# Patient Record
Sex: FEMALE | Race: WHITE | NOT HISPANIC OR LATINO | Employment: FULL TIME | URBAN - NONMETROPOLITAN AREA
[De-identification: names, ages, dates, MRNs, and addresses within clinical notes are randomized per-mention and may not be internally consistent; named-entity substitution may affect disease eponyms.]

---

## 2017-03-24 ENCOUNTER — OFFICE VISIT (OUTPATIENT)
Dept: NEUROLOGY | Facility: CLINIC | Age: 37
End: 2017-03-24

## 2017-03-24 VITALS
BODY MASS INDEX: 40.48 KG/M2 | WEIGHT: 220 LBS | HEIGHT: 62 IN | SYSTOLIC BLOOD PRESSURE: 128 MMHG | HEART RATE: 74 BPM | DIASTOLIC BLOOD PRESSURE: 88 MMHG

## 2017-03-24 DIAGNOSIS — E03.9 HYPOTHYROIDISM, UNSPECIFIED TYPE: ICD-10-CM

## 2017-03-24 DIAGNOSIS — G43.719 INTRACTABLE CHRONIC MIGRAINE WITHOUT AURA AND WITHOUT STATUS MIGRAINOSUS: Primary | ICD-10-CM

## 2017-03-24 PROCEDURE — 99213 OFFICE O/P EST LOW 20 MIN: CPT | Performed by: CLINICAL NURSE SPECIALIST

## 2017-03-24 RX ORDER — LEVETIRACETAM 500 MG/1
500 TABLET ORAL 3 TIMES DAILY
Qty: 90 TABLET | Refills: 5 | Status: SHIPPED | OUTPATIENT
Start: 2017-03-24 | End: 2017-10-13 | Stop reason: SDUPTHER

## 2017-03-24 RX ORDER — INDOMETHACIN 25 MG/1
25 CAPSULE ORAL 2 TIMES DAILY PRN
Qty: 60 CAPSULE | Refills: 2 | Status: SHIPPED | OUTPATIENT
Start: 2017-03-24 | End: 2017-10-13

## 2017-03-24 RX ORDER — SUMATRIPTAN 100 MG/1
100 TABLET, FILM COATED ORAL ONCE AS NEEDED
Qty: 9 TABLET | Refills: 1 | Status: SHIPPED | OUTPATIENT
Start: 2017-03-24 | End: 2017-10-13 | Stop reason: SDUPTHER

## 2017-03-24 NOTE — PATIENT INSTRUCTIONS
Wean and d/c indocin. Take one time daily with largest meal for 1-2 weeks then d/c.   Take indocin prn at on set of HA.

## 2017-03-24 NOTE — PROGRESS NOTES
Subjective     Chief Complaint   Patient presents with   • Migraine     1 migraine per month. Doing well. 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 She was last seen by 9/2/16. She had been on multiple treatments for migraine including Botox which was stopped as she was having no improvement. As a last ditch effort Indocin 25mg BID was started an patient has had significant improvement with headaches. She does occasionally have  Headaches:   One migraine monthly, last about 1 day and can take imitrex and can get relief. Onzetra did not show improvement with efficacy. But 2-3 HA per month can 2-3 days and come and go over the 2-3 days. Still taking indocin 25 mg BID.  Has not had to miss work because of HA.     She was tried on Zantac for GI distress but not able to tolerate. She is continuing Indocin 25 mg BID with food. She has had tremendous success with this in decreasing the number of HA and migraines. However, she is now having progressive GI side effects secondary to the indocin. She is also continuing with Keppra for her migraines and uses Imitrex as abortive agent.    Migraine    This is a chronic problem. Episode onset: while in college. The problem occurs daily. Progression since onset: significant improvement with indocin and has had one severe headache lastin 5 days with nausea/vomiting. The pain is located in the occipital and bilateral (as well as right side of head) region. The quality of the pain is described as stabbing and sharp. The pain is at a severity of 8/10. The pain is severe. Associated symptoms include nausea, phonophobia, photophobia and vomiting. Pertinent negatives include no dizziness, fever, rhinorrhea, sore throat or weakness. The symptoms are aggravated by weather changes and menstrual cycle. Treatments tried: topamax, vimpat, maxalt, relpax, elavil, fioricet, DHE, Keppra,  "Indocin, botox, zomig. The treatment provided moderate relief.        Current Outpatient Prescriptions   Medication Sig Dispense Refill   • AMETHIA 0.15-0.03 &0.01 MG tablet TK 1 T PO QD  3   • busPIRone (BUSPAR) 5 MG tablet Take 5 mg by mouth 1 (One) Time Per Week.  3   • cyclobenzaprine (FLEXERIL) 10 MG tablet TK 1 T PO TID PRN  3   • hydrOXYzine (ATARAX) 25 MG tablet Take 25 mg by mouth Daily.  1   • indomethacin (INDOCIN) 25 MG capsule Take 1 capsule by mouth 2 (Two) Times a Day As Needed for Mild Pain (1-3). 60 capsule 2   • levETIRAcetam (KEPPRA) 500 MG tablet Take 1 tablet by mouth 3 (Three) Times a Day. 500 in am and 1000 in pm 90 tablet 5   • levothyroxine (SYNTHROID, LEVOTHROID) 25 MCG tablet Take 25 mcg by mouth daily.     • LYRICA 75 MG capsule TK ONE C PO  TID  1   • SUMAtriptan (IMITREX) 100 MG tablet Take 1 tablet by mouth 1 (One) Time As Needed for migraine (1 tab by mouth as needed, max 2 tabs per day) for up to 1 dose. 9 tablet 1     No current facility-administered medications for this visit.        Past Medical History:   Diagnosis Date   • Fibromyalgia    • Migraines        Past Surgical History:   Procedure Laterality Date   •  SECTION         family history includes No Known Problems in her father and mother.    Social History   Substance Use Topics   • Smoking status: Never Smoker   • Smokeless tobacco: Never Used   • Alcohol use No       Review of Systems   Constitutional: Positive for fatigue. Negative for fever.   HENT: Negative for postnasal drip, rhinorrhea and sore throat.    Eyes: Positive for photophobia.   Respiratory: Negative.  Negative for apnea, choking and shortness of breath.    Cardiovascular: Negative.  Negative for chest pain.   Gastrointestinal: Positive for constipation, nausea and vomiting. Negative for diarrhea.        Recently having sensation as though \"food stuck\"   Endocrine: Negative.    Genitourinary: Negative.  Negative for dysuria and frequency. " "  Musculoskeletal: Positive for arthralgias. Negative for gait problem, joint swelling and myalgias.   Skin: Negative.    Allergic/Immunologic: Negative.    Neurological: Positive for headaches. Negative for dizziness, tremors, speech difficulty and weakness.   Hematological: Negative.  Negative for adenopathy.   Psychiatric/Behavioral: Negative.  Negative for agitation and confusion.   All other systems reviewed and are negative.      Objective     /88  Pulse 74  Ht 62\" (157.5 cm)  Wt 220 lb (99.8 kg)  BMI 40.24 kg/m2, Body mass index is 40.24 kg/(m^2).    Physical Exam   Constitutional: She is oriented to person, place, and time. Vital signs are normal. She appears well-developed and well-nourished.   HENT:   Head: Normocephalic and atraumatic.   Right Ear: Hearing and external ear normal.   Left Ear: Hearing and external ear normal.   Nose: Nose normal.   Mouth/Throat: Uvula is midline, oropharynx is clear and moist and mucous membranes are normal.   Eyes: EOM and lids are normal. Pupils are equal, round, and reactive to light.   Neck: Trachea normal and normal range of motion. Neck supple. Carotid bruit is not present.   Cardiovascular: Normal rate, regular rhythm, S1 normal, S2 normal and normal heart sounds.    Pulmonary/Chest: Effort normal and breath sounds normal.   Abdominal: Soft. Bowel sounds are normal.   Musculoskeletal: Normal range of motion.   Neurological: She is alert and oriented to person, place, and time. She has normal strength and normal reflexes. No cranial nerve deficit or sensory deficit. She displays a negative Romberg sign.   CN II:  Visual fields full.  Pupils equally reactive to light  CN III, IV, VI:  Extraocular Muscles full with no signs of nystagmus  CN V:  Facial sensory is symmetric with no asymetries.  CN VII:  Facial motor symmetric  CN VIII:  Gross hearing intact bilaterally  CN IX:  Palate elevates symmetrically  CN X:  Palate elevates symmetrically  CN XI:  " Shoulder shrug symmetric  CN XII:  Tongue is midline on protrusion   Skin: Skin is warm and dry.   Psychiatric: She has a normal mood and affect. Her speech is normal and behavior is normal. Cognition and memory are normal.   Nursing note and vitals reviewed.           ASSESSMENT/PLAN    Diagnoses and all orders for this visit:    Intractable chronic migraine without aura and without status migrainosus    Other orders  -     indomethacin (INDOCIN) 25 MG capsule; Take 1 capsule by mouth 2 (Two) Times a Day As Needed for Mild Pain (1-3).  -     levETIRAcetam (KEPPRA) 500 MG tablet; Take 1 tablet by mouth 3 (Three) Times a Day. 500 in am and 1000 in pm  -     SUMAtriptan (IMITREX) 100 MG tablet; Take 1 tablet by mouth 1 (One) Time As Needed for migraine (1 tab by mouth as needed, max 2 tabs per day) for up to 1 dose.    MEDICAL DECISION MAKIN. Continue with Keppra 500 mg in AM and 1000 mg PM for migraine prophylaxis.  2. Wean and d/c indocin and use as abortive agent only  3. Continue with Imitrex as abortive agent.             allergies and all known medications/prescriptions have been reviewed using resources available on this encounter.    Return in about 6 months (around 2017).        HOLLY Loja

## 2017-10-13 ENCOUNTER — OFFICE VISIT (OUTPATIENT)
Dept: NEUROLOGY | Facility: CLINIC | Age: 37
End: 2017-10-13

## 2017-10-13 ENCOUNTER — INFUSION (OUTPATIENT)
Dept: ONCOLOGY | Facility: HOSPITAL | Age: 37
End: 2017-10-13

## 2017-10-13 VITALS
SYSTOLIC BLOOD PRESSURE: 138 MMHG | DIASTOLIC BLOOD PRESSURE: 96 MMHG | HEART RATE: 98 BPM | BODY MASS INDEX: 40.67 KG/M2 | OXYGEN SATURATION: 100 % | WEIGHT: 221 LBS | HEIGHT: 62 IN | RESPIRATION RATE: 16 BRPM | TEMPERATURE: 98.8 F

## 2017-10-13 VITALS
BODY MASS INDEX: 40.67 KG/M2 | HEIGHT: 62 IN | OXYGEN SATURATION: 99 % | RESPIRATION RATE: 16 BRPM | DIASTOLIC BLOOD PRESSURE: 98 MMHG | WEIGHT: 221 LBS | HEART RATE: 116 BPM | SYSTOLIC BLOOD PRESSURE: 140 MMHG

## 2017-10-13 DIAGNOSIS — G43.719 INTRACTABLE CHRONIC MIGRAINE WITHOUT AURA AND WITHOUT STATUS MIGRAINOSUS: Primary | ICD-10-CM

## 2017-10-13 PROCEDURE — 25010000002 DEXAMETHASONE PER 1 MG: Performed by: CLINICAL NURSE SPECIALIST

## 2017-10-13 PROCEDURE — 96374 THER/PROPH/DIAG INJ IV PUSH: CPT

## 2017-10-13 PROCEDURE — 96360 HYDRATION IV INFUSION INIT: CPT

## 2017-10-13 PROCEDURE — 96361 HYDRATE IV INFUSION ADD-ON: CPT

## 2017-10-13 PROCEDURE — 25010000002 PROMETHAZINE PER 50 MG: Performed by: CLINICAL NURSE SPECIALIST

## 2017-10-13 PROCEDURE — 25010000002 DIPHENHYDRAMINE PER 50 MG: Performed by: CLINICAL NURSE SPECIALIST

## 2017-10-13 PROCEDURE — 99214 OFFICE O/P EST MOD 30 MIN: CPT | Performed by: CLINICAL NURSE SPECIALIST

## 2017-10-13 PROCEDURE — 96375 TX/PRO/DX INJ NEW DRUG ADDON: CPT

## 2017-10-13 RX ORDER — SODIUM CHLORIDE 9 MG/ML
500 INJECTION, SOLUTION INTRAVENOUS ONCE
Status: COMPLETED | OUTPATIENT
Start: 2017-10-13 | End: 2017-10-13

## 2017-10-13 RX ORDER — LEVETIRACETAM 500 MG/1
TABLET ORAL
Qty: 90 TABLET | Refills: 5 | Status: SHIPPED | OUTPATIENT
Start: 2017-10-13 | End: 2017-11-29

## 2017-10-13 RX ORDER — DIPHENHYDRAMINE HYDROCHLORIDE 50 MG/ML
25 INJECTION INTRAMUSCULAR; INTRAVENOUS ONCE
OUTPATIENT
Start: 2017-10-13

## 2017-10-13 RX ORDER — PROMETHAZINE HYDROCHLORIDE 25 MG/ML
25 INJECTION, SOLUTION INTRAMUSCULAR; INTRAVENOUS ONCE
Status: COMPLETED | OUTPATIENT
Start: 2017-10-13 | End: 2017-10-13

## 2017-10-13 RX ORDER — PROMETHAZINE HYDROCHLORIDE 25 MG/ML
25 INJECTION, SOLUTION INTRAMUSCULAR; INTRAVENOUS ONCE
Status: CANCELLED | OUTPATIENT
Start: 2017-10-13

## 2017-10-13 RX ORDER — DIPHENHYDRAMINE HYDROCHLORIDE 50 MG/ML
25 INJECTION INTRAMUSCULAR; INTRAVENOUS ONCE
Status: COMPLETED | OUTPATIENT
Start: 2017-10-13 | End: 2017-10-13

## 2017-10-13 RX ORDER — DEXAMETHASONE SODIUM PHOSPHATE 4 MG/ML
10 INJECTION, SOLUTION INTRA-ARTICULAR; INTRALESIONAL; INTRAMUSCULAR; INTRAVENOUS; SOFT TISSUE ONCE
Status: CANCELLED
Start: 2017-10-13 | End: 2017-10-13

## 2017-10-13 RX ORDER — SODIUM CHLORIDE 9 MG/ML
500 INJECTION, SOLUTION INTRAVENOUS ONCE
OUTPATIENT
Start: 2017-10-13 | End: 2017-10-13

## 2017-10-13 RX ORDER — MEMANTINE HYDROCHLORIDE 5 MG/1
5 TABLET ORAL DAILY
Qty: 60 TABLET | Refills: 3 | Status: SHIPPED | OUTPATIENT
Start: 2017-10-13 | End: 2017-11-20 | Stop reason: SDUPTHER

## 2017-10-13 RX ORDER — DEXAMETHASONE SODIUM PHOSPHATE 10 MG/ML
10 INJECTION INTRAMUSCULAR; INTRAVENOUS ONCE
Status: COMPLETED | OUTPATIENT
Start: 2017-10-13 | End: 2017-10-13

## 2017-10-13 RX ORDER — DIPHENHYDRAMINE HYDROCHLORIDE 50 MG/ML
25 INJECTION INTRAMUSCULAR; INTRAVENOUS ONCE
Status: CANCELLED | OUTPATIENT
Start: 2017-10-13

## 2017-10-13 RX ORDER — PROMETHAZINE HYDROCHLORIDE 25 MG/ML
25 INJECTION, SOLUTION INTRAMUSCULAR; INTRAVENOUS ONCE
OUTPATIENT
Start: 2017-10-13

## 2017-10-13 RX ORDER — SUMATRIPTAN 100 MG/1
100 TABLET, FILM COATED ORAL ONCE AS NEEDED
Qty: 9 TABLET | Refills: 2 | Status: SHIPPED | OUTPATIENT
Start: 2017-10-13 | End: 2018-01-19 | Stop reason: ALTCHOICE

## 2017-10-13 RX ORDER — TRIAMCINOLONE ACETONIDE 5 MG/G
CREAM TOPICAL
Refills: 1 | COMMUNITY
Start: 2017-08-28 | End: 2017-10-13

## 2017-10-13 RX ORDER — SODIUM CHLORIDE 9 MG/ML
500 INJECTION, SOLUTION INTRAVENOUS ONCE
Status: CANCELLED | OUTPATIENT
Start: 2017-10-13 | End: 2017-10-13

## 2017-10-13 RX ORDER — PROMETHAZINE HYDROCHLORIDE 25 MG/1
TABLET ORAL
Qty: 15 TABLET | Refills: 1 | Status: SHIPPED | OUTPATIENT
Start: 2017-10-13 | End: 2017-11-29

## 2017-10-13 RX ADMIN — PROMETHAZINE HYDROCHLORIDE 25 MG: 25 INJECTION INTRAMUSCULAR; INTRAVENOUS at 14:33

## 2017-10-13 RX ADMIN — SODIUM CHLORIDE 500 ML: 9 INJECTION, SOLUTION INTRAVENOUS at 14:28

## 2017-10-13 RX ADMIN — DIPHENHYDRAMINE HYDROCHLORIDE 25 MG: 50 INJECTION, SOLUTION INTRAMUSCULAR; INTRAVENOUS at 14:37

## 2017-10-13 RX ADMIN — DEXAMETHASONE SODIUM PHOSPHATE 10 MG: 10 INJECTION, SOLUTION INTRAMUSCULAR; INTRAVENOUS at 14:46

## 2017-10-13 NOTE — PROGRESS NOTES
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 She is here today for follow up for migraine. She had been on multiple treatments for migraine including Botox which was stopped as she was having no improvement. As a last ditch effort Indocin 25mg BID was started an patient has had significant improvement with headaches. However she was beginning to have GI effects and Indocin was discontinued. In the last few months patient states she has been having increasing frequency and intensity of her migraines. She gets only mild relief with Imitrex. Migraines are typically right orbital/temporal and on more than one occasion has had some on left side. She has a HA now that has been present for 2 weeks. She has missed work because of migraine and has reduced her work load but nothing has helped. She has had some numbness on her face. With severe HA is having imbalance and impaired cognition.   She is also continuing with Keppra for her migraines and uses Imitrex as abortive agent.    Migraine    This is a chronic problem. Episode onset: while in college. The problem occurs daily. Progression since onset: significant improvement with indocin and has had one severe headache lastin 5 days with nausea/vomiting. The pain is located in the occipital and bilateral (as well as right side of head) region. The quality of the pain is described as stabbing and sharp. The pain is at a severity of 8/10. The pain is severe. Associated symptoms include nausea, phonophobia, photophobia and vomiting. Pertinent negatives include no dizziness, fever, rhinorrhea, sore throat or weakness. The symptoms are aggravated by weather changes and menstrual cycle. Treatments tried: topamax, vimpat, maxalt, relpax, elavil, fioricet, DHE, Keppra, Indocin, botox,  "zomig. The treatment provided moderate relief.        Current Outpatient Prescriptions   Medication Sig Dispense Refill   • AMETHIA 0.15-0.03 &0.01 MG tablet TK 1 T PO QD  3   • busPIRone (BUSPAR) 5 MG tablet Take 5 mg by mouth 1 (One) Time Per Week.  3   • cyclobenzaprine (FLEXERIL) 10 MG tablet TK 1 T PO TID PRN  3   • hydrOXYzine (ATARAX) 25 MG tablet Take 25 mg by mouth Daily.  1   • levothyroxine (SYNTHROID, LEVOTHROID) 25 MCG tablet Take 25 mcg by mouth daily.     • LYRICA 75 MG capsule TK ONE C PO  TID  1   • levETIRAcetam (KEPPRA) 500 MG tablet TAKE 500 in am and 1000 in pm 90 tablet 5   • memantine (NAMENDA) 5 MG tablet Take 1 tablet by mouth Daily. 60 tablet 3   • promethazine (PHENERGAN) 25 MG tablet TAKE 1/2 TO 1 TABLET EVERY 6 HOURS AS NEEDED FOR NAUSEA. 15 tablet 1   • SUMAtriptan (IMITREX) 100 MG tablet Take 1 tablet by mouth 1 (One) Time As Needed for Migraine (1 tab by mouth as needed, max 2 tabs per day) for up to 1 dose. 9 tablet 2     No current facility-administered medications for this visit.        Past Medical History:   Diagnosis Date   • Fibromyalgia    • Migraines        Past Surgical History:   Procedure Laterality Date   •  SECTION         family history includes No Known Problems in her father and mother.    Social History   Substance Use Topics   • Smoking status: Never Smoker   • Smokeless tobacco: Never Used   • Alcohol use No       Review of Systems   Constitutional: Positive for fatigue. Negative for fever.   HENT: Negative for postnasal drip, rhinorrhea and sore throat.    Eyes: Positive for photophobia.   Respiratory: Negative.  Negative for apnea, choking and shortness of breath.    Cardiovascular: Negative.  Negative for chest pain.   Gastrointestinal: Positive for constipation, nausea and vomiting. Negative for diarrhea.        Recently having sensation as though \"food stuck\"   Endocrine: Negative.    Genitourinary: Negative.  Negative for dysuria and frequency. " "  Musculoskeletal: Positive for arthralgias. Negative for gait problem, joint swelling and myalgias.   Skin: Negative.    Allergic/Immunologic: Negative.    Neurological: Positive for headaches. Negative for dizziness, tremors, speech difficulty and weakness.   Hematological: Negative.  Negative for adenopathy.   Psychiatric/Behavioral: Negative.  Negative for agitation and confusion.   All other systems reviewed and are negative.      Objective     /98 (BP Location: Left arm, Patient Position: Sitting, Cuff Size: Adult)  Pulse 116  Resp 16  Ht 62\" (157.5 cm)  Wt 221 lb (100 kg)  SpO2 99%  BMI 40.42 kg/m2, Body mass index is 40.42 kg/(m^2).    Physical Exam   Constitutional: She is oriented to person, place, and time. Vital signs are normal. She appears well-developed and well-nourished.   HENT:   Head: Normocephalic and atraumatic.   Right Ear: Hearing and external ear normal.   Left Ear: Hearing and external ear normal.   Nose: Nose normal.   Mouth/Throat: Uvula is midline, oropharynx is clear and moist and mucous membranes are normal.   Eyes: Conjunctivae, EOM and lids are normal. Pupils are equal, round, and reactive to light.   Neck: Trachea normal and normal range of motion. Neck supple. Carotid bruit is not present.   Cardiovascular: Normal rate, regular rhythm, S1 normal, S2 normal and normal heart sounds.    Pulmonary/Chest: Effort normal and breath sounds normal.   Abdominal: Soft. Bowel sounds are normal.   Musculoskeletal: Normal range of motion.   Neurological: She is alert and oriented to person, place, and time. She has normal strength and normal reflexes. No cranial nerve deficit or sensory deficit. She displays a negative Romberg sign.   CN II:  Visual fields full.  Pupils equally reactive to light  CN III, IV, VI:  Extraocular Muscles full with no signs of nystagmus  CN V:  Facial sensory is symmetric with no asymetries.  CN VII:  Facial motor symmetric  CN VIII:  Gross hearing intact " bilaterally  CN IX:  Palate elevates symmetrically  CN X:  Palate elevates symmetrically  CN XI:  Shoulder shrug symmetric  CN XII:  Tongue is midline on protrusion   Skin: Skin is warm and dry.   Psychiatric: She has a normal mood and affect. Her speech is normal and behavior is normal. Cognition and memory are normal.   Nursing note and vitals reviewed.           ASSESSMENT/PLAN    Diagnoses and all orders for this visit:    Intractable chronic migraine without aura and without status migrainosus  -     MRI brain w wo contrast; Future  -     Cancel: sodium chloride 0.9 % infusion 500 mL; Infuse 500 mL into a venous catheter 1 (One) Time.  -     Cancel: diphenhydrAMINE (BENADRYL) injection 25 mg; Infuse 0.5 mL into a venous catheter 1 (One) Time.  -     Cancel: promethazine (PHENERGAN) injection 25 mg; Inject 1 mL as directed 1 (One) Time.  -     Cancel: dexamethasone (DECADRON) injection 10 mg; Infuse 2.5 mL into a venous catheter 1 (One) Time.    Other orders  -     Discontinue: triamcinolone (KENALOG) 0.5 % cream; TAHIR EXT AA BID PRN  -     memantine (NAMENDA) 5 MG tablet; Take 1 tablet by mouth Daily.  -     promethazine (PHENERGAN) 25 MG tablet; TAKE 1/2 TO 1 TABLET EVERY 6 HOURS AS NEEDED FOR NAUSEA.  -     SUMAtriptan (IMITREX) 100 MG tablet; Take 1 tablet by mouth 1 (One) Time As Needed for Migraine (1 tab by mouth as needed, max 2 tabs per day) for up to 1 dose.  -     levETIRAcetam (KEPPRA) 500 MG tablet; TAKE 500 in am and 1000 in pm      MEDICAL DECISION MAKIN. Continue with Keppra 500 mg in AM and 1000 mg PM for migraine prophylaxis.  2. Will try Namenda 5 mg BID and counseled on side effects  3. Continue with Imitrex as abortive agent.   4. Obtain MRI brain as prior MRI brain did show abnormality that could possible be a single focus of demyelination would be in the differential diagnosis.  5. Does not use tobacco  6. Elevated BMI -      Patient given printed education with AVS for diet/exerise  with AVS and encouraged to include 30 minutes of exercise 3-4 times weekly.  7. Will send to outpatient for migraine cocktail. Patient does have a .     allergies and all known medications/prescriptions have been reviewed using resources available on this encounter.    Return in about 6 weeks (around 11/24/2017).        Chantal Euceda, APRN

## 2017-10-13 NOTE — PATIENT INSTRUCTIONS

## 2017-10-18 ENCOUNTER — TELEPHONE (OUTPATIENT)
Dept: NEUROLOGY | Facility: CLINIC | Age: 37
End: 2017-10-18

## 2017-10-18 NOTE — TELEPHONE ENCOUNTER
She called to let Chantal know that she has tried Vimpat in the past for her headaches and it did work well for her. She will continue with the Namenda, but wanted Chantal to know that could be an option again in the future if Chantal thought it might be ok.

## 2017-11-03 ENCOUNTER — APPOINTMENT (OUTPATIENT)
Dept: MRI IMAGING | Facility: HOSPITAL | Age: 37
End: 2017-11-03

## 2017-11-03 ENCOUNTER — HOSPITAL ENCOUNTER (OUTPATIENT)
Dept: MRI IMAGING | Facility: HOSPITAL | Age: 37
Discharge: HOME | End: 2017-11-03
Admitting: CLINICAL NURSE SPECIALIST

## 2017-11-03 DIAGNOSIS — G43.719 INTRACTABLE CHRONIC MIGRAINE WITHOUT AURA AND WITHOUT STATUS MIGRAINOSUS: ICD-10-CM

## 2017-11-03 PROCEDURE — A9577 INJ MULTIHANCE: HCPCS | Performed by: CLINICAL NURSE SPECIALIST

## 2017-11-03 PROCEDURE — 82565 ASSAY OF CREATININE: CPT

## 2017-11-03 PROCEDURE — 70553 MRI BRAIN STEM W/O & W/DYE: CPT

## 2017-11-03 PROCEDURE — 0 GADOBENATE DIMEGLUMINE 529 MG/ML SOLUTION: Performed by: CLINICAL NURSE SPECIALIST

## 2017-11-03 RX ADMIN — GADOBENATE DIMEGLUMINE 20 ML: 529 INJECTION, SOLUTION INTRAVENOUS at 07:30

## 2017-11-04 LAB — CREAT BLDA-MCNC: 0.9 MG/DL (ref 0.6–1.3)

## 2017-11-21 RX ORDER — MEMANTINE HYDROCHLORIDE 5 MG/1
5 TABLET ORAL 2 TIMES DAILY
Qty: 60 TABLET | Refills: 3 | Status: SHIPPED | OUTPATIENT
Start: 2017-11-21 | End: 2017-11-29

## 2017-11-29 ENCOUNTER — APPOINTMENT (OUTPATIENT)
Dept: GENERAL RADIOLOGY | Facility: HOSPITAL | Age: 37
End: 2017-11-29

## 2017-11-29 ENCOUNTER — HOSPITAL ENCOUNTER (OUTPATIENT)
Facility: HOSPITAL | Age: 37
Setting detail: OBSERVATION
Discharge: HOME | End: 2017-11-30
Attending: EMERGENCY MEDICINE | Admitting: INTERNAL MEDICINE

## 2017-11-29 DIAGNOSIS — R51.9 INTRACTABLE HEADACHE, UNSPECIFIED CHRONICITY PATTERN, UNSPECIFIED HEADACHE TYPE: Primary | ICD-10-CM

## 2017-11-29 DIAGNOSIS — R07.9 CHEST PAIN, UNSPECIFIED TYPE: ICD-10-CM

## 2017-11-29 LAB
ALBUMIN SERPL-MCNC: 4.3 G/DL (ref 3.5–5)
ALBUMIN/GLOB SERPL: 1.2 G/DL (ref 1.1–2.5)
ALP SERPL-CCNC: 86 U/L (ref 24–120)
ALT SERPL W P-5'-P-CCNC: 28 U/L (ref 0–54)
ANION GAP SERPL CALCULATED.3IONS-SCNC: 10 MMOL/L (ref 4–13)
AST SERPL-CCNC: 32 U/L (ref 7–45)
BASOPHILS # BLD AUTO: 0.07 10*3/MM3 (ref 0–0.2)
BASOPHILS NFR BLD AUTO: 0.7 % (ref 0–2)
BILIRUB SERPL-MCNC: 0.4 MG/DL (ref 0.1–1)
BUN BLD-MCNC: 7 MG/DL (ref 5–21)
BUN/CREAT SERPL: 7.7 (ref 7–25)
CALCIUM SPEC-SCNC: 9.5 MG/DL (ref 8.4–10.4)
CHLORIDE SERPL-SCNC: 105 MMOL/L (ref 98–110)
CO2 SERPL-SCNC: 27 MMOL/L (ref 24–31)
CREAT BLD-MCNC: 0.91 MG/DL (ref 0.5–1.4)
D DIMER PPP FEU-MCNC: 0.52 MG/L (FEU) (ref 0–0.5)
DEPRECATED RDW RBC AUTO: 39.9 FL (ref 40–54)
EOSINOPHIL # BLD AUTO: 0.13 10*3/MM3 (ref 0–0.7)
EOSINOPHIL NFR BLD AUTO: 1.2 % (ref 0–4)
ERYTHROCYTE [DISTWIDTH] IN BLOOD BY AUTOMATED COUNT: 14 % (ref 12–15)
GFR SERPL CREATININE-BSD FRML MDRD: 70 ML/MIN/1.73
GLOBULIN UR ELPH-MCNC: 3.5 GM/DL
GLUCOSE BLD-MCNC: 87 MG/DL (ref 70–100)
HBA1C MFR BLD: 5.7 %
HCG SERPL QL: NEGATIVE
HCT VFR BLD AUTO: 39.9 % (ref 37–47)
HGB BLD-MCNC: 13 G/DL (ref 12–16)
HOLD SPECIMEN: NORMAL
HOLD SPECIMEN: NORMAL
IMM GRANULOCYTES # BLD: 0.04 10*3/MM3 (ref 0–0.03)
IMM GRANULOCYTES NFR BLD: 0.4 % (ref 0–5)
LYMPHOCYTES # BLD AUTO: 2.38 10*3/MM3 (ref 0.72–4.86)
LYMPHOCYTES NFR BLD AUTO: 22.5 % (ref 15–45)
MAGNESIUM SERPL-MCNC: 1.9 MG/DL (ref 1.4–2.2)
MCH RBC QN AUTO: 25.7 PG (ref 28–32)
MCHC RBC AUTO-ENTMCNC: 32.6 G/DL (ref 33–36)
MCV RBC AUTO: 79 FL (ref 82–98)
MONOCYTES # BLD AUTO: 0.52 10*3/MM3 (ref 0.19–1.3)
MONOCYTES NFR BLD AUTO: 4.9 % (ref 4–12)
NEUTROPHILS # BLD AUTO: 7.45 10*3/MM3 (ref 1.87–8.4)
NEUTROPHILS NFR BLD AUTO: 70.3 % (ref 39–78)
NRBC BLD MANUAL-RTO: 0 /100 WBC (ref 0–0)
PLATELET # BLD AUTO: 321 10*3/MM3 (ref 130–400)
PMV BLD AUTO: 10.3 FL (ref 6–12)
POTASSIUM BLD-SCNC: 4.1 MMOL/L (ref 3.5–5.3)
PROT SERPL-MCNC: 7.8 G/DL (ref 6.3–8.7)
RBC # BLD AUTO: 5.05 10*6/MM3 (ref 4.2–5.4)
SODIUM BLD-SCNC: 142 MMOL/L (ref 135–145)
TROPONIN I SERPL-MCNC: <0.012 NG/ML (ref 0–0.03)
TSH SERPL DL<=0.05 MIU/L-ACNC: 2.94 MIU/ML (ref 0.47–4.68)
VIT B12 BLD-MCNC: 646 PG/ML (ref 239–931)
WBC NRBC COR # BLD: 10.59 10*3/MM3 (ref 4.8–10.8)
WHOLE BLOOD HOLD SPECIMEN: NORMAL
WHOLE BLOOD HOLD SPECIMEN: NORMAL

## 2017-11-29 PROCEDURE — 36415 COLL VENOUS BLD VENIPUNCTURE: CPT

## 2017-11-29 PROCEDURE — 93005 ELECTROCARDIOGRAM TRACING: CPT

## 2017-11-29 PROCEDURE — 84484 ASSAY OF TROPONIN QUANT: CPT | Performed by: EMERGENCY MEDICINE

## 2017-11-29 PROCEDURE — 25010000002 PROCHLORPERAZINE EDISYLATE PER 10 MG: Performed by: PSYCHIATRY & NEUROLOGY

## 2017-11-29 PROCEDURE — 99285 EMERGENCY DEPT VISIT HI MDM: CPT

## 2017-11-29 PROCEDURE — G0378 HOSPITAL OBSERVATION PER HR: HCPCS

## 2017-11-29 PROCEDURE — 25010000002 ONDANSETRON PER 1 MG: Performed by: EMERGENCY MEDICINE

## 2017-11-29 PROCEDURE — 96361 HYDRATE IV INFUSION ADD-ON: CPT

## 2017-11-29 PROCEDURE — 96375 TX/PRO/DX INJ NEW DRUG ADDON: CPT

## 2017-11-29 PROCEDURE — 80053 COMPREHEN METABOLIC PANEL: CPT | Performed by: EMERGENCY MEDICINE

## 2017-11-29 PROCEDURE — 85025 COMPLETE CBC W/AUTO DIFF WBC: CPT | Performed by: EMERGENCY MEDICINE

## 2017-11-29 PROCEDURE — 25010000002 ENOXAPARIN PER 10 MG: Performed by: INTERNAL MEDICINE

## 2017-11-29 PROCEDURE — 71010 HC CHEST PA OR AP: CPT

## 2017-11-29 PROCEDURE — 84703 CHORIONIC GONADOTROPIN ASSAY: CPT | Performed by: EMERGENCY MEDICINE

## 2017-11-29 PROCEDURE — 96372 THER/PROPH/DIAG INJ SC/IM: CPT

## 2017-11-29 PROCEDURE — 25010000002 DIHYDROERGOTAMINE MESYLATE PER 1 MG: Performed by: PSYCHIATRY & NEUROLOGY

## 2017-11-29 PROCEDURE — 99255 IP/OBS CONSLTJ NEW/EST HI 80: CPT | Performed by: PSYCHIATRY & NEUROLOGY

## 2017-11-29 PROCEDURE — 84443 ASSAY THYROID STIM HORMONE: CPT | Performed by: INTERNAL MEDICINE

## 2017-11-29 PROCEDURE — 83036 HEMOGLOBIN GLYCOSYLATED A1C: CPT | Performed by: INTERNAL MEDICINE

## 2017-11-29 PROCEDURE — 96374 THER/PROPH/DIAG INJ IV PUSH: CPT

## 2017-11-29 PROCEDURE — 93010 ELECTROCARDIOGRAM REPORT: CPT | Performed by: INTERNAL MEDICINE

## 2017-11-29 PROCEDURE — 82607 VITAMIN B-12: CPT | Performed by: INTERNAL MEDICINE

## 2017-11-29 PROCEDURE — 25010000002 HYDROMORPHONE PER 4 MG: Performed by: EMERGENCY MEDICINE

## 2017-11-29 PROCEDURE — 85379 FIBRIN DEGRADATION QUANT: CPT | Performed by: EMERGENCY MEDICINE

## 2017-11-29 PROCEDURE — 84484 ASSAY OF TROPONIN QUANT: CPT | Performed by: NURSE PRACTITIONER

## 2017-11-29 PROCEDURE — 83735 ASSAY OF MAGNESIUM: CPT | Performed by: INTERNAL MEDICINE

## 2017-11-29 RX ORDER — CYCLOBENZAPRINE HCL 10 MG
10 TABLET ORAL 3 TIMES DAILY PRN
Status: DISCONTINUED | OUTPATIENT
Start: 2017-11-29 | End: 2017-11-30 | Stop reason: HOSPADM

## 2017-11-29 RX ORDER — LEVETIRACETAM 500 MG/1
1000 TABLET ORAL EVERY EVENING
Status: DISCONTINUED | OUTPATIENT
Start: 2017-11-29 | End: 2017-11-30 | Stop reason: HOSPADM

## 2017-11-29 RX ORDER — ONDANSETRON 4 MG/1
4 TABLET, FILM COATED ORAL EVERY 6 HOURS PRN
Status: DISCONTINUED | OUTPATIENT
Start: 2017-11-29 | End: 2017-11-30 | Stop reason: HOSPADM

## 2017-11-29 RX ORDER — ACETAMINOPHEN 325 MG/1
650 TABLET ORAL EVERY 4 HOURS PRN
Status: DISCONTINUED | OUTPATIENT
Start: 2017-11-29 | End: 2017-11-30 | Stop reason: HOSPADM

## 2017-11-29 RX ORDER — LEVETIRACETAM 500 MG/1
500 TABLET ORAL EVERY MORNING
Status: DISCONTINUED | OUTPATIENT
Start: 2017-11-30 | End: 2017-11-30 | Stop reason: HOSPADM

## 2017-11-29 RX ORDER — MEMANTINE HYDROCHLORIDE 5 MG/1
5 TABLET ORAL EVERY 12 HOURS SCHEDULED
Status: DISCONTINUED | OUTPATIENT
Start: 2017-11-29 | End: 2017-11-30

## 2017-11-29 RX ORDER — LEVOTHYROXINE SODIUM 0.03 MG/1
25 TABLET ORAL
Status: DISCONTINUED | OUTPATIENT
Start: 2017-11-30 | End: 2017-11-30 | Stop reason: HOSPADM

## 2017-11-29 RX ORDER — SODIUM CHLORIDE 9 MG/ML
75 INJECTION, SOLUTION INTRAVENOUS CONTINUOUS
Status: DISCONTINUED | OUTPATIENT
Start: 2017-11-29 | End: 2017-11-30 | Stop reason: HOSPADM

## 2017-11-29 RX ORDER — BUSPIRONE HYDROCHLORIDE 5 MG/1
5 TABLET ORAL DAILY
Status: DISCONTINUED | OUTPATIENT
Start: 2017-11-29 | End: 2017-11-30 | Stop reason: HOSPADM

## 2017-11-29 RX ORDER — ONDANSETRON 2 MG/ML
4 INJECTION INTRAMUSCULAR; INTRAVENOUS EVERY 6 HOURS PRN
Status: DISCONTINUED | OUTPATIENT
Start: 2017-11-29 | End: 2017-11-30 | Stop reason: HOSPADM

## 2017-11-29 RX ORDER — PREGABALIN 75 MG/1
75 CAPSULE ORAL DAILY
Status: DISCONTINUED | OUTPATIENT
Start: 2017-11-30 | End: 2017-11-30 | Stop reason: HOSPADM

## 2017-11-29 RX ORDER — LEVONORGESTREL / ETHINYL ESTRADIOL AND ETHINYL ESTRADIOL 150-30(84)
1 KIT ORAL DAILY
COMMUNITY
End: 2020-07-24 | Stop reason: SDUPTHER

## 2017-11-29 RX ORDER — HYDROXYZINE HYDROCHLORIDE 25 MG/1
25 TABLET, FILM COATED ORAL DAILY
Status: DISCONTINUED | OUTPATIENT
Start: 2017-11-29 | End: 2017-11-30 | Stop reason: HOSPADM

## 2017-11-29 RX ORDER — CYCLOBENZAPRINE HCL 10 MG
10 TABLET ORAL 3 TIMES DAILY PRN
COMMUNITY
End: 2020-06-19 | Stop reason: SDUPTHER

## 2017-11-29 RX ORDER — PROMETHAZINE HYDROCHLORIDE 25 MG/1
25 TABLET ORAL EVERY 6 HOURS PRN
Status: DISCONTINUED | OUTPATIENT
Start: 2017-11-29 | End: 2017-11-30 | Stop reason: HOSPADM

## 2017-11-29 RX ORDER — LEVETIRACETAM 500 MG/1
1000 TABLET ORAL EVERY EVENING
COMMUNITY
End: 2018-07-20 | Stop reason: DRUGHIGH

## 2017-11-29 RX ORDER — SODIUM CHLORIDE 0.9 % (FLUSH) 0.9 %
1-10 SYRINGE (ML) INJECTION AS NEEDED
Status: DISCONTINUED | OUTPATIENT
Start: 2017-11-29 | End: 2017-11-30 | Stop reason: HOSPADM

## 2017-11-29 RX ORDER — PROMETHAZINE HYDROCHLORIDE 25 MG/1
25 TABLET ORAL EVERY 6 HOURS PRN
COMMUNITY
End: 2019-05-17 | Stop reason: ALTCHOICE

## 2017-11-29 RX ORDER — MEMANTINE HYDROCHLORIDE 5 MG/1
5 TABLET ORAL 2 TIMES DAILY
COMMUNITY
End: 2018-03-19 | Stop reason: SDUPTHER

## 2017-11-29 RX ORDER — PREGABALIN 75 MG/1
150 CAPSULE ORAL NIGHTLY
Status: DISCONTINUED | OUTPATIENT
Start: 2017-11-29 | End: 2017-11-30 | Stop reason: HOSPADM

## 2017-11-29 RX ORDER — DIHYDROERGOTAMINE MESYLATE 1 MG/ML
1 INJECTION, SOLUTION INTRAMUSCULAR; INTRAVENOUS; SUBCUTANEOUS ONCE
Status: COMPLETED | OUTPATIENT
Start: 2017-11-29 | End: 2017-11-29

## 2017-11-29 RX ORDER — ONDANSETRON 4 MG/1
4 TABLET, ORALLY DISINTEGRATING ORAL EVERY 6 HOURS PRN
Status: DISCONTINUED | OUTPATIENT
Start: 2017-11-29 | End: 2017-11-30 | Stop reason: HOSPADM

## 2017-11-29 RX ORDER — LEVETIRACETAM 500 MG/1
500 TABLET ORAL EVERY MORNING
COMMUNITY
End: 2018-07-20 | Stop reason: DRUGHIGH

## 2017-11-29 RX ORDER — PREGABALIN 75 MG/1
75 CAPSULE ORAL 3 TIMES DAILY
COMMUNITY
End: 2019-05-17

## 2017-11-29 RX ORDER — DIHYDROERGOTAMINE MESYLATE 1 MG/ML
1 INJECTION, SOLUTION INTRAMUSCULAR; INTRAVENOUS; SUBCUTANEOUS ONCE
Status: DISCONTINUED | OUTPATIENT
Start: 2017-11-29 | End: 2017-11-30 | Stop reason: HOSPADM

## 2017-11-29 RX ORDER — ONDANSETRON 2 MG/ML
4 INJECTION INTRAMUSCULAR; INTRAVENOUS ONCE
Status: COMPLETED | OUTPATIENT
Start: 2017-11-29 | End: 2017-11-29

## 2017-11-29 RX ORDER — SUMATRIPTAN 50 MG/1
100 TABLET, FILM COATED ORAL ONCE AS NEEDED
Status: DISCONTINUED | OUTPATIENT
Start: 2017-11-29 | End: 2017-11-30 | Stop reason: HOSPADM

## 2017-11-29 RX ADMIN — PREGABALIN 150 MG: 75 CAPSULE ORAL at 20:35

## 2017-11-29 RX ADMIN — HYDROXYZINE HYDROCHLORIDE 25 MG: 25 TABLET ORAL at 20:32

## 2017-11-29 RX ADMIN — LEVETIRACETAM 1000 MG: 500 TABLET, FILM COATED ORAL at 20:32

## 2017-11-29 RX ADMIN — ONDANSETRON 4 MG: 2 INJECTION, SOLUTION INTRAMUSCULAR; INTRAVENOUS at 11:22

## 2017-11-29 RX ADMIN — MEMANTINE HYDROCHLORIDE 5 MG: 5 TABLET, FILM COATED ORAL at 20:32

## 2017-11-29 RX ADMIN — PROCHLORPERAZINE EDISYLATE 5 MG: 5 INJECTION INTRAMUSCULAR; INTRAVENOUS at 20:18

## 2017-11-29 RX ADMIN — ENOXAPARIN SODIUM 40 MG: 40 INJECTION SUBCUTANEOUS at 17:50

## 2017-11-29 RX ADMIN — HYDROMORPHONE HYDROCHLORIDE 1 MG: 1 INJECTION, SOLUTION INTRAMUSCULAR; INTRAVENOUS; SUBCUTANEOUS at 11:21

## 2017-11-29 RX ADMIN — DIHYDROERGOTAMINE MESYLATE 1 MG: 1 INJECTION, SOLUTION INTRAMUSCULAR; INTRAVENOUS; SUBCUTANEOUS at 20:26

## 2017-11-29 RX ADMIN — SODIUM CHLORIDE 75 ML/HR: 9 INJECTION, SOLUTION INTRAVENOUS at 17:44

## 2017-11-30 VITALS
RESPIRATION RATE: 16 BRPM | BODY MASS INDEX: 41.04 KG/M2 | TEMPERATURE: 98.1 F | HEART RATE: 87 BPM | OXYGEN SATURATION: 96 % | SYSTOLIC BLOOD PRESSURE: 121 MMHG | HEIGHT: 62 IN | WEIGHT: 223 LBS | DIASTOLIC BLOOD PRESSURE: 77 MMHG

## 2017-11-30 LAB
ANION GAP SERPL CALCULATED.3IONS-SCNC: 10 MMOL/L (ref 4–13)
BACTERIA UR QL AUTO: ABNORMAL /HPF
BASOPHILS # BLD AUTO: 0.06 10*3/MM3 (ref 0–0.2)
BASOPHILS NFR BLD AUTO: 0.6 % (ref 0–2)
BILIRUB UR QL STRIP: NEGATIVE
BUN BLD-MCNC: 7 MG/DL (ref 5–21)
BUN/CREAT SERPL: 7.4 (ref 7–25)
CALCIUM SPEC-SCNC: 8.9 MG/DL (ref 8.4–10.4)
CHLORIDE SERPL-SCNC: 106 MMOL/L (ref 98–110)
CLARITY UR: CLEAR
CO2 SERPL-SCNC: 25 MMOL/L (ref 24–31)
COLOR UR: YELLOW
CREAT BLD-MCNC: 0.95 MG/DL (ref 0.5–1.4)
DEPRECATED RDW RBC AUTO: 39.1 FL (ref 40–54)
EOSINOPHIL # BLD AUTO: 0.23 10*3/MM3 (ref 0–0.7)
EOSINOPHIL NFR BLD AUTO: 2.4 % (ref 0–4)
ERYTHROCYTE [DISTWIDTH] IN BLOOD BY AUTOMATED COUNT: 13.7 % (ref 12–15)
GFR SERPL CREATININE-BSD FRML MDRD: 66 ML/MIN/1.73
GLUCOSE BLD-MCNC: 85 MG/DL (ref 70–100)
GLUCOSE UR STRIP-MCNC: NEGATIVE MG/DL
HCT VFR BLD AUTO: 37.5 % (ref 37–47)
HGB BLD-MCNC: 12.6 G/DL (ref 12–16)
HGB UR QL STRIP.AUTO: ABNORMAL
HYALINE CASTS UR QL AUTO: ABNORMAL /LPF
IMM GRANULOCYTES # BLD: 0.02 10*3/MM3 (ref 0–0.03)
IMM GRANULOCYTES NFR BLD: 0.2 % (ref 0–5)
KETONES UR QL STRIP: NEGATIVE
LEUKOCYTE ESTERASE UR QL STRIP.AUTO: ABNORMAL
LYMPHOCYTES # BLD AUTO: 2.95 10*3/MM3 (ref 0.72–4.86)
LYMPHOCYTES NFR BLD AUTO: 31 % (ref 15–45)
MCH RBC QN AUTO: 26.1 PG (ref 28–32)
MCHC RBC AUTO-ENTMCNC: 33.6 G/DL (ref 33–36)
MCV RBC AUTO: 77.8 FL (ref 82–98)
MONOCYTES # BLD AUTO: 0.56 10*3/MM3 (ref 0.19–1.3)
MONOCYTES NFR BLD AUTO: 5.9 % (ref 4–12)
NEUTROPHILS # BLD AUTO: 5.71 10*3/MM3 (ref 1.87–8.4)
NEUTROPHILS NFR BLD AUTO: 59.9 % (ref 39–78)
NITRITE UR QL STRIP: NEGATIVE
NRBC BLD MANUAL-RTO: 0 /100 WBC (ref 0–0)
PH UR STRIP.AUTO: 5.5 [PH] (ref 5–8)
PLATELET # BLD AUTO: 280 10*3/MM3 (ref 130–400)
PMV BLD AUTO: 9.8 FL (ref 6–12)
POTASSIUM BLD-SCNC: 4 MMOL/L (ref 3.5–5.3)
PROT UR QL STRIP: NEGATIVE
RBC # BLD AUTO: 4.82 10*6/MM3 (ref 4.2–5.4)
RBC # UR: ABNORMAL /HPF
REF LAB TEST METHOD: ABNORMAL
SODIUM BLD-SCNC: 141 MMOL/L (ref 135–145)
SP GR UR STRIP: 1.01 (ref 1–1.03)
SQUAMOUS #/AREA URNS HPF: ABNORMAL /HPF
UROBILINOGEN UR QL STRIP: ABNORMAL
WBC NRBC COR # BLD: 9.53 10*3/MM3 (ref 4.8–10.8)
WBC UR QL AUTO: ABNORMAL /HPF

## 2017-11-30 PROCEDURE — G0378 HOSPITAL OBSERVATION PER HR: HCPCS

## 2017-11-30 PROCEDURE — 96361 HYDRATE IV INFUSION ADD-ON: CPT

## 2017-11-30 PROCEDURE — 99232 SBSQ HOSP IP/OBS MODERATE 35: CPT | Performed by: PSYCHIATRY & NEUROLOGY

## 2017-11-30 PROCEDURE — 80048 BASIC METABOLIC PNL TOTAL CA: CPT | Performed by: INTERNAL MEDICINE

## 2017-11-30 PROCEDURE — 85025 COMPLETE CBC W/AUTO DIFF WBC: CPT | Performed by: INTERNAL MEDICINE

## 2017-11-30 PROCEDURE — 81001 URINALYSIS AUTO W/SCOPE: CPT | Performed by: NURSE PRACTITIONER

## 2017-11-30 RX ADMIN — LEVOTHYROXINE SODIUM 25 MCG: 25 TABLET ORAL at 06:36

## 2017-11-30 RX ADMIN — SODIUM CHLORIDE 75 ML/HR: 9 INJECTION, SOLUTION INTRAVENOUS at 06:36

## 2017-11-30 RX ADMIN — PREGABALIN 75 MG: 75 CAPSULE ORAL at 08:50

## 2017-11-30 RX ADMIN — HYDROXYZINE HYDROCHLORIDE 25 MG: 25 TABLET ORAL at 08:50

## 2017-11-30 RX ADMIN — MEMANTINE HYDROCHLORIDE 5 MG: 5 TABLET, FILM COATED ORAL at 08:50

## 2017-11-30 RX ADMIN — LEVETIRACETAM 500 MG: 500 TABLET, FILM COATED ORAL at 06:47

## 2017-11-30 RX ADMIN — BUSPIRONE HYDROCHLORIDE 5 MG: 5 TABLET ORAL at 08:50

## 2017-12-01 NOTE — PAYOR COMM NOTE
"[id number]  NEED APPROVAL FOR OBS STAY  DC HOME 11-30-17  UR PHONE    [phone]    Jonah English (37 y.o. Female)     Date of Birth Social Security Number Address Home Phone MRN    1980  [facility] [facility] [id number] [phone] [id number]    [employer] Marital Status          Catholic        Admission Date Admission Type Admitting Provider Attending Provider Department, Room/Bed    11/29/17 Emergency Branden Parks MD  [facility] 3A, 334/1    Discharge Date Discharge Disposition Discharge Destination        11/30/2017 Home or Self Care             Attending Provider: (none)    Allergies:  Codeine, Elavil [Amitriptyline Hcl], Maxalt [Rizatriptan Benzoate], Relpax [Eletriptan]    Isolation:  None   Infection:  None   Code Status:  Prior    Ht:  62\" (157.5 cm)   Wt:  223 lb (101 kg)    Admission Cmt:  None   Principal Problem:  None                Active Insurance as of 11/29/2017     Primary Coverage     Payor Plan Insurance Group Employer/Plan Group    [employer] BLUE CROSS AN[employer] BLUE CROSS BLUE SHIELD PPO [id number]     Payor Plan Address Payor Plan Phone Number Effective From Effective To    [id number] [phone] 5/1/2016     [place] [address]       Subscriber Name Subscriber Birth Date Member ID       JONAH ENGLISH 1980 [id number]                 Emergency Contacts      (Rel.) Home Phone Work Phone Mobile Phone    Nestor English (Spouse) [phone] -- [phone]               Discharge Summary      HOLLY Weber at 11/30/2017  1:22 PM     Attestation signed by Yoandy Parks MD at 12/1/2017  9:09 AM        I have discussed the care of Jonah English, including pertinent history and exam findings, with the nurse practitioner.    I have seen and examined the patient and the key elements of all parts of the encounter have been performed by me.  I agree with the assessment, plan and orders as " documented by Dominga BARRERA, after I modified the exam findings and the plan of treatments and the final version is my approved version of the assessment.        Electronically signed by Yoandy Parks MD on 12/1/2017 at 9:09 AM                                 [facility]  Yoandy Parks M.D.  BRICE Parks M.D.  HOLLY Acevdeo    Internal Medicine Discharge Summary    Patient ID: Talita English  MRN: [id number]     Acct:  [id number]       Patient's PCP: Branden Parks MD    Admit Date: 11/29/2017     Discharge Date:   11/30/17    Admitting Physician: Branden Parks MD    Discharge Physician: HOLLY Weber     Active Discharge Diagnoses:  1. Status migrainosus  2. Noncardiac chest pain  3. Obesity  4. Fibromyalgia        Hospital Problems  Active Problems:    Intractable headache     Past Medical History:   Diagnosis Date   • Fibromyalgia    • Migraines        The patient was seen and examined on the day of discharge and this discharge summary is in conjunction with any daily progress note from day of discharge.    Code Status:  Full Code    Hospital Course: The patient had been in her usual state of health when she developed an intractable global headache refractory to home medication regimen that includes imitrex and keppra. The patient experienced sound and light sensitivity with mild nausea. The patient then reports developing left side chest pain. Due to an extensive family cardiac history, the patient presented to ER for further evaluation and treatment. Cardiac enzymes remained negative and there were no significant EKG changes. The pain was reproducible under the left breast on palpation. Pain control efforts regarding the headache were unsuccessful in ER and the patient was admitted to our service for further evaluation and treatment. She was seen in consultation by neurology and given a cocktail of compazine and DHE which the patient tolerated  without difficulty and provided good symptom relief. Today, on exam, the patient is free of headache and still has some faint left side chest pain that is reproducible on palpation and aggravated by movement. She is anxious for discharge to home. The patient will be discharged to home with close follow up by neurology and PCP.     Consults:  Dr. Matthew (neurology)    Disposition: home    Discharged Condition: Stable    Follow Up: Branden Parks MD  [address] [place] [address]  [phone]      keep scheduled follow up      Diet: Diet Regular    Discharge Medications:    Talita English   Home Medication Instructions ALVINA:[id number]    Printed on:11/30/17 8857   Medication Information                      busPIRone (BUSPAR) 5 MG tablet  Take 5 mg by mouth Daily.             cyclobenzaprine (FLEXERIL) 10 MG tablet  Take 10 mg by mouth 3 (Three) Times a Day As Needed for Muscle Spasms.             hydrOXYzine (ATARAX) 25 MG tablet  Take 25 mg by mouth Daily.             levETIRAcetam (KEPPRA) 500 MG tablet  Take 500 mg by mouth Every Morning.             levETIRAcetam (KEPPRA) 500 MG tablet  Take 1,000 mg by mouth Every Evening.             Levonorgest-Eth Estrad 91-Day (AMETHIA) 0.15-0.03 &0.01 MG tablet  Take 1 tablet by mouth Daily.             levothyroxine (SYNTHROID, LEVOTHROID) 25 MCG tablet  Take 25 mcg by mouth daily.             memantine (NAMENDA) 5 MG tablet  Take 5 mg by mouth 2 (Two) Times a Day.             pregabalin (LYRICA) 75 MG capsule  Take 75 mg by mouth 3 (Three) Times a Day.             promethazine (PHENERGAN) 25 MG tablet  Take 25 mg by mouth Every 6 (Six) Hours As Needed for Nausea or Vomiting.             SUMAtriptan (IMITREX) 100 MG tablet  Take 1 tablet by mouth 1 (One) Time As Needed for Migraine (1 tab by mouth as needed, max 2 tabs per day) for up to 1 dose.                 Time Spent on discharge is  20 minutes in patient examination, evaluation, patient/family  counseling as well as medication reconciliation, prescriptions for required medications, discharge plan and follow up.     Electronically signed by HOLLY Weber on 11/30/2017 at 1:22 PM      Electronically signed by Yoandy Parks MD at 12/1/2017  9:09 AM

## 2017-12-01 NOTE — THERAPY DISCHARGE NOTE
Acute Care - Physical Therapy Discharge Summary  [facility]       Patient Name: Talita English  : 1980  MRN: [id number]    Today's Date: 2017                 Admit Date: 2017      PT Recommendation and Plan    Visit Dx:    ICD-10-CM ICD-9-CM   1. Intractable headache, unspecified chronicity pattern, unspecified headache type R51 784.0   2. Chest pain, unspecified type R07.9 786.50                         PT Discharge Summary  Anticipated Discharge Disposition: home  Reason for Discharge: Discharge from facility  Outcomes Achieved: Other (d/c before eval complete only here 24 hours)  Discharge Destination: Home      Martha Chun, PADMA   2017

## 2018-01-19 ENCOUNTER — OFFICE VISIT (OUTPATIENT)
Dept: NEUROLOGY | Facility: CLINIC | Age: 38
End: 2018-01-19

## 2018-01-19 VITALS
DIASTOLIC BLOOD PRESSURE: 78 MMHG | SYSTOLIC BLOOD PRESSURE: 128 MMHG | BODY MASS INDEX: 41.59 KG/M2 | HEIGHT: 62 IN | WEIGHT: 226 LBS | HEART RATE: 82 BPM

## 2018-01-19 DIAGNOSIS — G43.719 INTRACTABLE CHRONIC MIGRAINE WITHOUT AURA AND WITHOUT STATUS MIGRAINOSUS: Primary | ICD-10-CM

## 2018-01-19 PROCEDURE — 99214 OFFICE O/P EST MOD 30 MIN: CPT | Performed by: CLINICAL NURSE SPECIALIST

## 2018-01-19 RX ORDER — PROCHLORPERAZINE MALEATE 5 MG/1
5 TABLET ORAL EVERY 8 HOURS PRN
Qty: 20 TABLET | Refills: 2 | Status: SHIPPED | OUTPATIENT
Start: 2018-01-19 | End: 2018-04-20 | Stop reason: SDUPTHER

## 2018-01-19 RX ORDER — SUMATRIPTAN AND NAPROXEN SODIUM 85; 500 MG/1; MG/1
1 TABLET, FILM COATED ORAL
Qty: 9 TABLET | Refills: 2 | Status: SHIPPED | OUTPATIENT
Start: 2018-01-19 | End: 2018-01-23

## 2018-01-19 NOTE — PROGRESS NOTES
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 She was last seen 10/13/17 and was sent to [facility] for migraine cocktail. Patient got only partial relief from that and migraines escalated again over several weeks and presented to [facility] ED 11/29/17 with severe migraine with chest pain. She was admitted under Dr. Matthew care and received DHE which she states did give her relief for about 10 days. HAs and migraines have slowly returned and she reports that she has had HA/migraine for the last 7 daysince weather change when cold front came through 7 days ago. HA has been constant.  She is taking imitrex at least one time daily and if needed twice daily. She has not had to miss work. In October 2017 patient was also started on Namenda 5 mg BID and patient feels like this has been of some benefit. She has also begun to take butter ashley a few weeks ago but as above weather has triggered a constant headache. Patient is also experimenting with essential oils. Patient did have MRI brain 11/2017 and I have reviewed results.     Migraine    This is a chronic problem. The current episode started more than 1 year ago (while in college). The problem occurs daily. Progression since onset: significant improvement with indocin and has had one severe headache lastin 5 days with nausea/vomiting. The pain is located in the occipital and bilateral (as well as right side of head) region. The quality of the pain is described as stabbing and sharp. The pain is at a severity of 8/10. The pain is severe. Associated symptoms include nausea, phonophobia, photophobia and vomiting. Pertinent negatives include no dizziness, fever, rhinorrhea, sore throat or weakness. The symptoms are aggravated by weather changes and menstrual cycle. Treatments tried: topamax, vimpat, maxalt, relpax, elavil,  fioricet, DHE, Keppra, Indocin, botox, zomig, DHE, compazine. The treatment provided moderate relief.        Current Outpatient Prescriptions   Medication Sig Dispense Refill   • cyclobenzaprine (FLEXERIL) 10 MG tablet Take 10 mg by mouth 3 (Three) Times a Day As Needed for Muscle Spasms.     • hydrOXYzine (ATARAX) 25 MG tablet Take 25 mg by mouth Daily.  1   • levETIRAcetam (KEPPRA) 500 MG tablet Take 500 mg by mouth Every Morning.     • levETIRAcetam (KEPPRA) 500 MG tablet Take 1,000 mg by mouth Every Evening.     • Levonorgest-Eth Estrad 91-Day (AMETHIA) 0.15-0.03 &0.01 MG tablet Take 1 tablet by mouth Daily.     • levothyroxine (SYNTHROID, LEVOTHROID) 25 MCG tablet Take 25 mcg by mouth daily.     • memantine (NAMENDA) 5 MG tablet Take 5 mg by mouth 2 (Two) Times a Day.     • NON FORMULARY BUTTERBUR     • pregabalin (LYRICA) 75 MG capsule Take 75 mg by mouth 3 (Three) Times a Day.     • promethazine (PHENERGAN) 25 MG tablet Take 25 mg by mouth Every 6 (Six) Hours As Needed for Nausea or Vomiting.     • prochlorperazine (COMPAZINE) 5 MG tablet Take 1 tablet by mouth Every 8 (Eight) Hours As Needed for Nausea or Vomiting. 20 tablet 2   • SUMAtriptan-naproxen (TREXIMET)  MG per tablet Take 1 tablet by mouth Every 2 (Two) Hours As Needed for Migraine. 9 tablet 2     No current facility-administered medications for this visit.        Past Medical History:   Diagnosis Date   • Fibromyalgia    • Migraines        Past Surgical History:   Procedure Laterality Date   •  SECTION         family history includes No Known Problems in her father and mother.    Social History   Substance Use Topics   • Smoking status: Never Smoker   • Smokeless tobacco: Never Used   • Alcohol use No       Review of Systems   Constitutional: Positive for fatigue. Negative for fever.   HENT: Negative for postnasal drip, rhinorrhea and sore throat.    Eyes: Positive for photophobia.   Respiratory: Negative.  Negative for apnea,  "choking and shortness of breath.    Cardiovascular: Negative.  Negative for chest pain.   Gastrointestinal: Positive for constipation, nausea and vomiting. Negative for diarrhea.        Recently having sensation as though \"food stuck\"   Endocrine: Negative.    Genitourinary: Negative.  Negative for dysuria and frequency.   Musculoskeletal: Positive for arthralgias. Negative for gait problem, joint swelling and myalgias.   Skin: Negative.    Allergic/Immunologic: Negative.    Neurological: Positive for headaches. Negative for dizziness, tremors, speech difficulty and weakness.   Hematological: Negative.  Negative for adenopathy.   Psychiatric/Behavioral: Negative.  Negative for agitation and confusion.   All other systems reviewed and are negative.      Objective     /78  Pulse 82  Ht 157.5 cm (62\")  Wt 103 kg (226 lb)  BMI 41.34 kg/m2, Body mass index is 41.34 kg/(m^2).    Physical Exam   Constitutional: She is oriented to person, place, and time. Vital signs are normal. She appears well-developed and well-nourished.   HENT:   Head: Normocephalic and atraumatic.   Right Ear: Hearing and external ear normal.   Left Ear: Hearing and external ear normal.   Nose: Nose normal.   Mouth/Throat: Uvula is midline, oropharynx is clear and moist and mucous membranes are normal.   Eyes: Conjunctivae, EOM and lids are normal. Pupils are equal, round, and reactive to light.   Neck: Trachea normal and normal range of motion. Neck supple. Carotid bruit is not present.   Cardiovascular: Normal rate, regular rhythm, S1 normal, S2 normal and normal heart sounds.    Pulmonary/Chest: Effort normal and breath sounds normal.   Abdominal: Soft. Bowel sounds are normal.   Musculoskeletal: Normal range of motion.   Neurological: She is alert and oriented to person, place, and time. She has normal strength and normal reflexes. No cranial nerve deficit or sensory deficit. She displays a negative Romberg sign.   CN II:  Visual fields " full.  Pupils equally reactive to light  CN III, IV, VI:  Extraocular Muscles full with no signs of nystagmus  CN V:  Facial sensory is symmetric with no asymetries.  CN VII:  Facial motor symmetric  CN VIII:  Gross hearing intact bilaterally  CN IX:  Palate elevates symmetrically  CN X:  Palate elevates symmetrically  CN XI:  Shoulder shrug symmetric  CN XII:  Tongue is midline on protrusion   Skin: Skin is warm and dry.   Psychiatric: She has a normal mood and affect. Her speech is normal and behavior is normal. Cognition and memory are normal.   Nursing note and vitals reviewed.      Results for orders placed or performed during the hospital encounter of 11/29/17   Comprehensive Metabolic Panel   Result Value Ref Range    Glucose 87 70 - 100 mg/dL    BUN 7 5 - 21 mg/dL    Creatinine 0.91 0.50 - 1.40 mg/dL    Sodium 142 135 - 145 mmol/L    Potassium 4.1 3.5 - 5.3 mmol/L    Chloride 105 98 - 110 mmol/L    CO2 27.0 24.0 - 31.0 mmol/L    Calcium 9.5 8.4 - 10.4 mg/dL    Total Protein 7.8 6.3 - 8.7 g/dL    Albumin 4.30 3.50 - 5.00 g/dL    ALT (SGPT) 28 0 - 54 U/L    AST (SGOT) 32 7 - 45 U/L    Alkaline Phosphatase 86 24 - 120 U/L    Total Bilirubin 0.4 0.1 - 1.0 mg/dL    eGFR Non African Amer 70 >60 mL/min/1.73    Globulin 3.5 gm/dL    A/G Ratio 1.2 1.1 - 2.5 g/dL    BUN/Creatinine Ratio 7.7 7.0 - 25.0    Anion Gap 10.0 4.0 - 13.0 mmol/L   D-dimer, Quantitative   Result Value Ref Range    D-Dimer, Quantitative 0.52 (H) 0.00 - 0.50 mg/L (FEU)   Troponin   Result Value Ref Range    Troponin I <0.012 0.000 - 0.034 ng/mL   hCG, Serum, Qualitative   Result Value Ref Range    HCG Qualitative Negative Negative   CBC Auto Differential   Result Value Ref Range    WBC 10.59 4.80 - 10.80 10*3/mm3    RBC 5.05 4.20 - 5.40 10*6/mm3    Hemoglobin 13.0 12.0 - 16.0 g/dL    Hematocrit 39.9 37.0 - 47.0 %    MCV 79.0 (L) 82.0 - 98.0 fL    MCH 25.7 (L) 28.0 - 32.0 pg    MCHC 32.6 (L) 33.0 - 36.0 g/dL    RDW 14.0 12.0 - 15.0 %    RDW-SD  39.9 (L) 40.0 - 54.0 fl    MPV 10.3 6.0 - 12.0 fL    Platelets 321 130 - 400 10*3/mm3    Neutrophil % 70.3 39.0 - 78.0 %    Lymphocyte % 22.5 15.0 - 45.0 %    Monocyte % 4.9 4.0 - 12.0 %    Eosinophil % 1.2 0.0 - 4.0 %    Basophil % 0.7 0.0 - 2.0 %    Immature Grans % 0.4 0.0 - 5.0 %    Neutrophils, Absolute 7.45 1.87 - 8.40 10*3/mm3    Lymphocytes, Absolute 2.38 0.72 - 4.86 10*3/mm3    Monocytes, Absolute 0.52 0.19 - 1.30 10*3/mm3    Eosinophils, Absolute 0.13 0.00 - 0.70 10*3/mm3    Basophils, Absolute 0.07 0.00 - 0.20 10*3/mm3    Immature Grans, Absolute 0.04 (H) 0.00 - 0.03 10*3/mm3    nRBC 0.0 0.0 - 0.0 /100 WBC   Magnesium   Result Value Ref Range    Magnesium 1.9 1.4 - 2.2 mg/dL   TSH   Result Value Ref Range    TSH 2.940 0.470 - 4.680 mIU/mL   Hemoglobin A1c   Result Value Ref Range    Hemoglobin A1C 5.7 %   Vitamin B12   Result Value Ref Range    Vitamin B-12 646 239 - 931 pg/mL   Troponin   Result Value Ref Range    Troponin I <0.012 0.000 - 0.034 ng/mL   Troponin   Result Value Ref Range    Troponin I <0.012 0.000 - 0.034 ng/mL   Urinalysis With / Microscopic If Indicated - Urine, Clean Catch   Result Value Ref Range    Color, UA Yellow Yellow, Straw    Appearance, UA Clear Clear    pH, UA 5.5 5.0 - 8.0    Specific Gravity, UA 1.012 1.005 - 1.030    Glucose, UA Negative Negative    Ketones, UA Negative Negative    Bilirubin, UA Negative Negative    Blood, UA Trace (A) Negative    Protein, UA Negative Negative    Leuk Esterase, UA Moderate (2+) (A) Negative    Nitrite, UA Negative Negative    Urobilinogen, UA 0.2 E.U./dL 0.2 - 1.0 E.U./dL   Basic Metabolic Panel   Result Value Ref Range    Glucose 85 70 - 100 mg/dL    BUN 7 5 - 21 mg/dL    Creatinine 0.95 0.50 - 1.40 mg/dL    Sodium 141 135 - 145 mmol/L    Potassium 4.0 3.5 - 5.3 mmol/L    Chloride 106 98 - 110 mmol/L    CO2 25.0 24.0 - 31.0 mmol/L    Calcium 8.9 8.4 - 10.4 mg/dL    eGFR Non African Amer 66 >60 mL/min/1.73    BUN/Creatinine Ratio 7.4 7.0 -  25.0    Anion Gap 10.0 4.0 - 13.0 mmol/L   CBC Auto Differential   Result Value Ref Range    WBC 9.53 4.80 - 10.80 10*3/mm3    RBC 4.82 4.20 - 5.40 10*6/mm3    Hemoglobin 12.6 12.0 - 16.0 g/dL    Hematocrit 37.5 37.0 - 47.0 %    MCV 77.8 (L) 82.0 - 98.0 fL    MCH 26.1 (L) 28.0 - 32.0 pg    MCHC 33.6 33.0 - 36.0 g/dL    RDW 13.7 12.0 - 15.0 %    RDW-SD 39.1 (L) 40.0 - 54.0 fl    MPV 9.8 6.0 - 12.0 fL    Platelets 280 130 - 400 10*3/mm3    Neutrophil % 59.9 39.0 - 78.0 %    Lymphocyte % 31.0 15.0 - 45.0 %    Monocyte % 5.9 4.0 - 12.0 %    Eosinophil % 2.4 0.0 - 4.0 %    Basophil % 0.6 0.0 - 2.0 %    Immature Grans % 0.2 0.0 - 5.0 %    Neutrophils, Absolute 5.71 1.87 - 8.40 10*3/mm3    Lymphocytes, Absolute 2.95 0.72 - 4.86 10*3/mm3    Monocytes, Absolute 0.56 0.19 - 1.30 10*3/mm3    Eosinophils, Absolute 0.23 0.00 - 0.70 10*3/mm3    Basophils, Absolute 0.06 0.00 - 0.20 10*3/mm3    Immature Grans, Absolute 0.02 0.00 - 0.03 10*3/mm3    nRBC 0.0 0.0 - 0.0 /100 WBC   Urinalysis, Microscopic Only - Urine, Clean Catch   Result Value Ref Range    RBC, UA 3-5 (A) None Seen /HPF    WBC, UA 21-30 (A) None Seen /HPF    Bacteria, UA 1+ (A) None Seen /HPF    Squamous Epithelial Cells, UA 0-2 None Seen, 0-2 /HPF    Hyaline Casts, UA None Seen None Seen /LPF    Methodology Automated Microscopy    Light Blue Top   Result Value Ref Range    Extra Tube hold for add-on    Green Top (Gel)   Result Value Ref Range    Extra Tube Hold for add-ons.    Lavender Top   Result Value Ref Range    Extra Tube hold for add-on    Red Top   Result Value Ref Range    Extra Tube Hold for add-ons.       Mri brain: IMPRESSION:  Stable MRI brain with and without contrast, demonstrating a  FLAIR signal abnormality in the right frontal lobe and in the right  periventricular region. These findings can be seen with migraine  headaches. Alternatively, this may be related to demyelinating disease  or chronic small vessel ischemia. There is no abnormal  enhancement to  suggest active demyelination. Correlate clinically.    ASSESSMENT/PLAN    Diagnoses and all orders for this visit:    Intractable chronic migraine without aura and without status migrainosus    Other orders  -     NON FORMULARY; BUTTERBUR  -     prochlorperazine (COMPAZINE) 5 MG tablet; Take 1 tablet by mouth Every 8 (Eight) Hours As Needed for Nausea or Vomiting.  -     SUMAtriptan-naproxen (TREXIMET)  MG per tablet; Take 1 tablet by mouth Every 2 (Two) Hours As Needed for Migraine.    MEDICAL DECISION MAKIN. Continue with Keppra 500 mg in AM and 1000 mg PM for migraine prophylaxis.  2. Continue  Namenda 5 mg BID and counseled on side effects  3. Will change imitrex to Treximet and counseled on side effects.   4. Will add Compazine to take with treximet as abortive agent.  5. Does not use tobacco  6. Elevated BMI - Patient's BMI is above normal parameters. Follow-up plan includes:  no follow-up required.      Patient given printed education with AVS for diet/exerise with AVS and encouraged to include 30 minutes of exercise 3-4 times weekly.  7. If patient migraines worsens and progresses that required alternative therapy, Dr. Matthew and I have previously discussed will use DHE over that of migraine cocktail and DHE can be done in the infusion center.     At least 20 minutes of 25 spent in coordination of care and answering questions.     allergies and all known medications/prescriptions have been reviewed using resources available on this encounter.    Return in about 3 months (around 2018).        HOLLY Loja

## 2018-01-23 RX ORDER — NAPROXEN 500 MG/1
500 TABLET ORAL 2 TIMES DAILY PRN
Qty: 30 TABLET | Refills: 2 | Status: SHIPPED | OUTPATIENT
Start: 2018-01-23 | End: 2018-04-20 | Stop reason: SDUPTHER

## 2018-01-23 RX ORDER — SUMATRIPTAN 100 MG/1
100 TABLET, FILM COATED ORAL ONCE AS NEEDED
Qty: 9 TABLET | Refills: 2 | Status: SHIPPED | OUTPATIENT
Start: 2018-01-23 | End: 2018-04-20 | Stop reason: SDUPTHER

## 2018-01-23 NOTE — TELEPHONE ENCOUNTER
Talita called to let Chantal Ok know that her insurance will not pay for the Trexemet, but will pay for the two generics separately. She is calling to request those two be called in for her.

## 2018-03-19 RX ORDER — MEMANTINE HYDROCHLORIDE 5 MG/1
TABLET ORAL
Qty: 60 TABLET | Refills: 5 | Status: SHIPPED | OUTPATIENT
Start: 2018-03-19 | End: 2018-09-02 | Stop reason: SDUPTHER

## 2018-04-20 ENCOUNTER — OFFICE VISIT (OUTPATIENT)
Dept: NEUROLOGY | Facility: CLINIC | Age: 38
End: 2018-04-20

## 2018-04-20 VITALS
WEIGHT: 220 LBS | HEART RATE: 80 BPM | DIASTOLIC BLOOD PRESSURE: 78 MMHG | BODY MASS INDEX: 40.48 KG/M2 | HEIGHT: 62 IN | SYSTOLIC BLOOD PRESSURE: 122 MMHG

## 2018-04-20 DIAGNOSIS — G43.719 INTRACTABLE CHRONIC MIGRAINE WITHOUT AURA AND WITHOUT STATUS MIGRAINOSUS: Primary | ICD-10-CM

## 2018-04-20 PROCEDURE — 99213 OFFICE O/P EST LOW 20 MIN: CPT | Performed by: CLINICAL NURSE SPECIALIST

## 2018-04-20 RX ORDER — PROCHLORPERAZINE MALEATE 5 MG/1
5 TABLET ORAL EVERY 8 HOURS PRN
Qty: 20 TABLET | Refills: 2 | Status: SHIPPED | OUTPATIENT
Start: 2018-04-20 | End: 2018-07-20 | Stop reason: SDUPTHER

## 2018-04-20 RX ORDER — NAPROXEN 500 MG/1
500 TABLET ORAL 2 TIMES DAILY PRN
Qty: 30 TABLET | Refills: 5 | Status: SHIPPED | OUTPATIENT
Start: 2018-04-20 | End: 2018-11-02 | Stop reason: SDUPTHER

## 2018-04-20 RX ORDER — PROCHLORPERAZINE MALEATE 5 MG/1
TABLET ORAL
Qty: 300 TABLET | Refills: 2 | OUTPATIENT
Start: 2018-04-20

## 2018-04-20 RX ORDER — SUMATRIPTAN 100 MG/1
100 TABLET, FILM COATED ORAL ONCE AS NEEDED
Qty: 9 TABLET | Refills: 2 | Status: SHIPPED | OUTPATIENT
Start: 2018-04-20 | End: 2018-07-20 | Stop reason: SDUPTHER

## 2018-04-20 NOTE — PROGRESS NOTES
Subjective     Chief Complaint   Patient presents with   • Migraine     Migraines not as intense. And she has not had to take as many abortives lately.          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 She was last seen 1/2018. She reports that although she had a daily right occipital HA, migraines are less frequent. Last visit she was taking imitrex almost daily and now taking 1-2 times week. She does take naprosyn for more milder Migraines but with the most severe will take imitrex, naprosyn and compazine. She has run out butter ashley and has noticed increase in HA while off. She also takes Namenda and feels this is also benefit. She continues with Keppra 500 mg Am and 1000 mg PM. Patient reports swings in weather as a trigger.   As you recall she had severe migraine 11/2017 resulting in ED visit and had a DHE infusion.     Patient is also experimenting with essential oils.      Migraine    This is a chronic problem. The current episode started more than 1 year ago (while in college). The problem occurs daily. Progression since onset: significant improvement with indocin and has had one severe headache lastin 5 days with nausea/vomiting. The pain is located in the occipital and bilateral (as well as right side of head) region. The quality of the pain is described as stabbing and sharp. The pain is at a severity of 8/10. The pain is severe. Associated symptoms include nausea, phonophobia, photophobia and vomiting. Pertinent negatives include no dizziness, fever, rhinorrhea, sore throat or weakness. The symptoms are aggravated by weather changes and menstrual cycle. Treatments tried: topamax, vimpat, maxalt, relpax, elavil, fioricet, DHE, Keppra, Indocin, botox, zomig, DHE, compazine. The treatment provided moderate relief.        Current Outpatient Prescriptions   Medication Sig Dispense Refill   • cyclobenzaprine (FLEXERIL) 10 MG tablet  Take 10 mg by mouth 3 (Three) Times a Day As Needed for Muscle Spasms.     • hydrOXYzine (ATARAX) 25 MG tablet Take 25 mg by mouth Daily.  1   • levETIRAcetam (KEPPRA) 500 MG tablet Take 500 mg by mouth Every Morning.     • levETIRAcetam (KEPPRA) 500 MG tablet Take 1,000 mg by mouth Every Evening.     • Levonorgest-Eth Estrad 91-Day (AMETHIA) 0.15-0.03 &0.01 MG tablet Take 1 tablet by mouth Daily.     • levothyroxine (SYNTHROID, LEVOTHROID) 25 MCG tablet Take 25 mcg by mouth daily.     • memantine (NAMENDA) 5 MG tablet TAKE 1 TABLET BY MOUTH TWICE DAILY 60 tablet 5   • NON FORMULARY BUTTERBUR     • pregabalin (LYRICA) 75 MG capsule Take 75 mg by mouth 3 (Three) Times a Day.     • prochlorperazine (COMPAZINE) 5 MG tablet Take 1 tablet by mouth Every 8 (Eight) Hours As Needed for Nausea or Vomiting. 20 tablet 2   • promethazine (PHENERGAN) 25 MG tablet Take 25 mg by mouth Every 6 (Six) Hours As Needed for Nausea or Vomiting.     • naproxen (NAPROSYN) 500 MG tablet Take 1 tablet by mouth 2 (Two) Times a Day As Needed for Moderate Pain  or Headache. 30 tablet 5   • SUMAtriptan (IMITREX) 100 MG tablet Take 1 tablet by mouth 1 (One) Time As Needed for Migraine (Do not exceed 2 in 24 hours.). 9 tablet 2     No current facility-administered medications for this visit.        Past Medical History:   Diagnosis Date   • Fibromyalgia    • Migraines        Past Surgical History:   Procedure Laterality Date   •  SECTION         family history includes No Known Problems in her father and mother.    Social History   Substance Use Topics   • Smoking status: Never Smoker   • Smokeless tobacco: Never Used   • Alcohol use No       Review of Systems   Constitutional: Positive for fatigue. Negative for fever.   HENT: Negative for postnasal drip, rhinorrhea and sore throat.    Eyes: Positive for photophobia.   Respiratory: Negative.  Negative for apnea, choking and shortness of breath.    Cardiovascular: Negative.  Negative for  "chest pain.   Gastrointestinal: Positive for constipation, nausea and vomiting. Negative for diarrhea.        Recently having sensation as though \"food stuck\"   Endocrine: Negative.    Genitourinary: Negative.  Negative for dysuria and frequency.   Musculoskeletal: Positive for arthralgias. Negative for gait problem, joint swelling and myalgias.   Skin: Negative.    Allergic/Immunologic: Negative.    Neurological: Positive for headaches. Negative for dizziness, tremors, speech difficulty and weakness.   Hematological: Negative.  Negative for adenopathy.   Psychiatric/Behavioral: Negative.  Negative for agitation and confusion.   All other systems reviewed and are negative.      Objective     /78   Pulse 80   Ht 157.5 cm (62\")   Wt 99.8 kg (220 lb)   BMI 40.24 kg/m² , Body mass index is 40.24 kg/m².    Physical Exam   Constitutional: She is oriented to person, place, and time. Vital signs are normal. She appears well-developed and well-nourished. She is cooperative.   HENT:   Head: Normocephalic and atraumatic.   Right Ear: Hearing and external ear normal.   Left Ear: Hearing and external ear normal.   Nose: Nose normal.   Mouth/Throat: Uvula is midline, oropharynx is clear and moist and mucous membranes are normal.   Eyes: Conjunctivae, EOM and lids are normal. Pupils are equal, round, and reactive to light. Right eye exhibits normal extraocular motion and no nystagmus. Left eye exhibits normal extraocular motion and no nystagmus. Right pupil is round and reactive. Left pupil is round and reactive. Pupils are equal.   Neck: Trachea normal and normal range of motion. Neck supple. Carotid bruit is not present.   Cardiovascular: Normal rate, regular rhythm and normal heart sounds.    No murmur heard.  Pulmonary/Chest: Effort normal and breath sounds normal.   Abdominal: Soft. Bowel sounds are normal.   Musculoskeletal: Normal range of motion.   Neurological: She is alert and oriented to person, place, and " time. She has normal strength and normal reflexes. No cranial nerve deficit or sensory deficit. She displays a negative Romberg sign. GCS eye subscore is 4. GCS verbal subscore is 5. GCS motor subscore is 6.   Reflex Scores:       Tricep reflexes are 2+ on the right side and 2+ on the left side.       Bicep reflexes are 2+ on the right side and 2+ on the left side.       Brachioradialis reflexes are 2+ on the right side and 2+ on the left side.       Patellar reflexes are 2+ on the right side and 2+ on the left side.       Achilles reflexes are 2+ on the right side and 2+ on the left side.  CN II:  Visual fields full.  Pupils equally reactive to light  CN III, IV, VI:  Extraocular Muscles full with no signs of nystagmus  CN V:  Facial sensory is symmetric with no asymetries.  CN VII:  Facial motor symmetric  CN VIII:  Gross hearing intact bilaterally  CN IX:  Palate elevates symmetrically  CN X:  Palate elevates symmetrically  CN XI:  Shoulder shrug symmetric  CN XII:  Tongue is midline on protrusion    Full and symmetric strength bilateral upper and lower extremities   Skin: Skin is warm and dry.   Psychiatric: She has a normal mood and affect. Her speech is normal and behavior is normal. Cognition and memory are normal.   Nursing note and vitals reviewed.           ASSESSMENT/PLAN    Diagnoses and all orders for this visit:    Intractable chronic migraine without aura and without status migrainosus    Other orders  -     naproxen (NAPROSYN) 500 MG tablet; Take 1 tablet by mouth 2 (Two) Times a Day As Needed for Moderate Pain  or Headache.  -     SUMAtriptan (IMITREX) 100 MG tablet; Take 1 tablet by mouth 1 (One) Time As Needed for Migraine (Do not exceed 2 in 24 hours.).  -     prochlorperazine (COMPAZINE) 5 MG tablet; Take 1 tablet by mouth Every 8 (Eight) Hours As Needed for Nausea or Vomiting.      MEDICAL DECISION MAKIN. Continue with Keppra 500 mg in AM and 1000 mg PM for migraine prophylaxis.  2.  Continue  Namenda 5 mg BID and counseled on side effects  3. Continue with imitrex and prn naprosyn   4. Will continue Commpazine to take with treximet as abortive agent.  5. Does not use tobacco  6. Patient's Body mass index is 40.24 kg/m². BMI is above normal parameters. Follow-up plan includes:  no follow-up required.      7. If patient migraines worsens and progresses that required alternative therapy, Dr. Matthew and I have previously discussed will use DHE over that of migraine cocktail and DHE can be done in the infusion center.      allergies and all known medications/prescriptions have been reviewed using resources available on this encounter.    Return in about 3 months (around 7/20/2018).        HOLLY Loja

## 2018-05-07 RX ORDER — LEVETIRACETAM 500 MG/1
TABLET ORAL
Qty: 90 TABLET | Refills: 5 | Status: SHIPPED | OUTPATIENT
Start: 2018-05-07 | End: 2018-11-02 | Stop reason: SDUPTHER

## 2018-07-20 ENCOUNTER — OFFICE VISIT (OUTPATIENT)
Dept: NEUROLOGY | Facility: CLINIC | Age: 38
End: 2018-07-20

## 2018-07-20 VITALS
HEIGHT: 62 IN | DIASTOLIC BLOOD PRESSURE: 82 MMHG | WEIGHT: 226 LBS | HEART RATE: 80 BPM | SYSTOLIC BLOOD PRESSURE: 116 MMHG | BODY MASS INDEX: 41.59 KG/M2 | RESPIRATION RATE: 18 BRPM

## 2018-07-20 DIAGNOSIS — G43.719 INTRACTABLE CHRONIC MIGRAINE WITHOUT AURA AND WITHOUT STATUS MIGRAINOSUS: Primary | ICD-10-CM

## 2018-07-20 PROCEDURE — 99213 OFFICE O/P EST LOW 20 MIN: CPT | Performed by: CLINICAL NURSE SPECIALIST

## 2018-07-20 RX ORDER — SUMATRIPTAN 100 MG/1
100 TABLET, FILM COATED ORAL ONCE AS NEEDED
Qty: 9 TABLET | Refills: 5 | Status: SHIPPED | OUTPATIENT
Start: 2018-07-20 | End: 2019-05-17 | Stop reason: SINTOL

## 2018-07-20 RX ORDER — PROCHLORPERAZINE MALEATE 5 MG/1
5 TABLET ORAL EVERY 8 HOURS PRN
Qty: 20 TABLET | Refills: 5 | Status: SHIPPED | OUTPATIENT
Start: 2018-07-20 | End: 2018-07-20 | Stop reason: SDUPTHER

## 2018-07-20 RX ORDER — PROCHLORPERAZINE MALEATE 5 MG/1
TABLET ORAL
Qty: 60 TABLET | Refills: 1 | Status: SHIPPED | OUTPATIENT
Start: 2018-07-20 | End: 2019-05-17 | Stop reason: DRUGHIGH

## 2018-07-20 NOTE — PROGRESS NOTES
Subjective     Chief Complaint   Patient presents with   • Intractable chronic migraine     Patient is here for a 3 month follow-up.Patient states that migraines are worse and more frequent with the storms.         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 She was last seen 4/2018. She reports that although she had a daily right occipital HA, migraines are less frequent.patient is tolerating the cocktail of naprosyn, imitrex and compazine as abortive agent. She does at times take naprosyn and compazine to save imitrex for most severe HA. She has had to miss work at least once since last visit.  She does take naprosyn for more milder Migraines but with the most severe will take imitrex, naprosyn and compazine. She also takes Namenda and feels this is also benefit. She continues with Keppra 500 mg Am and 1000 mg PM. Patient reports swings in weather as a trigger.     Patient is also experimenting with essential oils    Migraine    This is a chronic problem. The current episode started more than 1 year ago (while in college). The problem occurs daily. Progression since onset: significant improvement with indocin and has had one severe headache lastin 5 days with nausea/vomiting. The pain is located in the occipital and bilateral (as well as right side of head) region. The quality of the pain is described as stabbing and sharp. The pain is at a severity of 8/10. The pain is severe. Associated symptoms include nausea, phonophobia, photophobia and vomiting. Pertinent negatives include no dizziness, fever, rhinorrhea, sore throat or weakness. The symptoms are aggravated by weather changes and menstrual cycle. Treatments tried: topamax, vimpat, maxalt, relpax, elavil, fioricet, DHE, Keppra, Indocin, botox, zomig, DHE, compazine. The treatment provided moderate relief.        Current Outpatient Prescriptions   Medication Sig Dispense Refill   •  cyclobenzaprine (FLEXERIL) 10 MG tablet Take 10 mg by mouth 3 (Three) Times a Day As Needed for Muscle Spasms.     • hydrOXYzine (ATARAX) 25 MG tablet Take 25 mg by mouth Daily.  1   • levETIRAcetam (KEPPRA) 500 MG tablet TAKE 1 TABLET BY MOUTH EVERY MORNING AND 2 TABLETS EVERY EVENING 90 tablet 5   • Levonorgest-Eth Estrad 91-Day (AMETHIA) 0.15-0.03 &0.01 MG tablet Take 1 tablet by mouth Daily.     • levothyroxine (SYNTHROID, LEVOTHROID) 25 MCG tablet Take 25 mcg by mouth daily.     • memantine (NAMENDA) 5 MG tablet TAKE 1 TABLET BY MOUTH TWICE DAILY 60 tablet 5   • naproxen (NAPROSYN) 500 MG tablet Take 1 tablet by mouth 2 (Two) Times a Day As Needed for Moderate Pain  or Headache. 30 tablet 5   • NON FORMULARY BUTTERBUR     • pregabalin (LYRICA) 75 MG capsule Take 75 mg by mouth 3 (Three) Times a Day.     • prochlorperazine (COMPAZINE) 5 MG tablet Take 1 tablet by mouth Every 8 (Eight) Hours As Needed for Nausea or Vomiting. 20 tablet 5   • promethazine (PHENERGAN) 25 MG tablet Take 25 mg by mouth Every 6 (Six) Hours As Needed for Nausea or Vomiting.     • SUMAtriptan (IMITREX) 100 MG tablet Take 1 tablet by mouth 1 (One) Time As Needed for Migraine (Do not exceed 2 in 24 hours.). 9 tablet 5     No current facility-administered medications for this visit.        Past Medical History:   Diagnosis Date   • Fibromyalgia    • Migraines        Past Surgical History:   Procedure Laterality Date   •  SECTION         family history includes No Known Problems in her father and mother.    Social History   Substance Use Topics   • Smoking status: Never Smoker   • Smokeless tobacco: Never Used   • Alcohol use No       Review of Systems   Constitutional: Positive for fatigue. Negative for fever.   HENT: Negative for postnasal drip, rhinorrhea and sore throat.    Eyes: Positive for photophobia.   Respiratory: Negative.  Negative for apnea, choking and shortness of breath.    Cardiovascular: Negative.  Negative for  "chest pain.   Gastrointestinal: Positive for constipation, nausea and vomiting. Negative for diarrhea.        Recently having sensation as though \"food stuck\"   Endocrine: Negative.    Genitourinary: Negative.  Negative for dysuria and frequency.   Musculoskeletal: Positive for arthralgias. Negative for gait problem, joint swelling and myalgias.   Skin: Negative.    Allergic/Immunologic: Negative.    Neurological: Positive for headaches. Negative for dizziness, tremors, speech difficulty and weakness.   Hematological: Negative.  Negative for adenopathy.   Psychiatric/Behavioral: Negative.  Negative for agitation and confusion.   All other systems reviewed and are negative.      Objective     /82 (BP Location: Left arm, Patient Position: Sitting)   Pulse 80   Resp 18   Ht 157.5 cm (62\")   Wt 103 kg (226 lb)   BMI 41.34 kg/m² , Body mass index is 41.34 kg/m².    Physical Exam   Constitutional: She is oriented to person, place, and time. Vital signs are normal. She appears well-developed and well-nourished. She is cooperative.   HENT:   Head: Normocephalic and atraumatic.   Right Ear: Hearing and external ear normal.   Left Ear: Hearing and external ear normal.   Nose: Nose normal.   Mouth/Throat: Uvula is midline, oropharynx is clear and moist and mucous membranes are normal.   Eyes: Pupils are equal, round, and reactive to light. Conjunctivae, EOM and lids are normal. Right eye exhibits normal extraocular motion and no nystagmus. Left eye exhibits normal extraocular motion and no nystagmus. Right pupil is round and reactive. Left pupil is round and reactive. Pupils are equal.   Neck: Trachea normal and normal range of motion. Neck supple. Carotid bruit is not present.   Cardiovascular: Normal rate, regular rhythm and normal heart sounds.    No murmur heard.  Pulmonary/Chest: Effort normal and breath sounds normal.   Abdominal: Soft. Bowel sounds are normal.   Musculoskeletal: Normal range of motion. "   Neurological: She is alert and oriented to person, place, and time. She has normal strength and normal reflexes. No cranial nerve deficit or sensory deficit. She displays a negative Romberg sign.   Reflex Scores:       Tricep reflexes are 2+ on the right side and 2+ on the left side.       Bicep reflexes are 2+ on the right side and 2+ on the left side.       Brachioradialis reflexes are 2+ on the right side and 2+ on the left side.       Patellar reflexes are 2+ on the right side and 2+ on the left side.       Achilles reflexes are 2+ on the right side and 2+ on the left side.  CN II:  Visual fields full.  Pupils equally reactive to light  CN III, IV, VI:  Extraocular Muscles full with no signs of nystagmus  CN V:  Facial sensory is symmetric with no asymetries.  CN VII:  Facial motor symmetric  CN VIII:  Gross hearing intact bilaterally  CN IX:  Palate elevates symmetrically  CN X:  Palate elevates symmetrically  CN XI:  Shoulder shrug symmetric  CN XII:  Tongue is midline on protrusion    Full and symmetric strength bilateral upper and lower extremities   Skin: Skin is warm and dry.   Psychiatric: She has a normal mood and affect. Her speech is normal and behavior is normal. Cognition and memory are normal.   Nursing note and vitals reviewed.      Results for orders placed or performed during the hospital encounter of 11/29/17   Comprehensive Metabolic Panel   Result Value Ref Range    Glucose 87 70 - 100 mg/dL    BUN 7 5 - 21 mg/dL    Creatinine 0.91 0.50 - 1.40 mg/dL    Sodium 142 135 - 145 mmol/L    Potassium 4.1 3.5 - 5.3 mmol/L    Chloride 105 98 - 110 mmol/L    CO2 27.0 24.0 - 31.0 mmol/L    Calcium 9.5 8.4 - 10.4 mg/dL    Total Protein 7.8 6.3 - 8.7 g/dL    Albumin 4.30 3.50 - 5.00 g/dL    ALT (SGPT) 28 0 - 54 U/L    AST (SGOT) 32 7 - 45 U/L    Alkaline Phosphatase 86 24 - 120 U/L    Total Bilirubin 0.4 0.1 - 1.0 mg/dL    eGFR Non African Amer 70 >60 mL/min/1.73    Globulin 3.5 gm/dL    A/G Ratio 1.2 1.1  - 2.5 g/dL    BUN/Creatinine Ratio 7.7 7.0 - 25.0    Anion Gap 10.0 4.0 - 13.0 mmol/L   D-dimer, Quantitative   Result Value Ref Range    D-Dimer, Quantitative 0.52 (H) 0.00 - 0.50 mg/L (FEU)   Troponin   Result Value Ref Range    Troponin I <0.012 0.000 - 0.034 ng/mL   hCG, Serum, Qualitative   Result Value Ref Range    HCG Qualitative Negative Negative   CBC Auto Differential   Result Value Ref Range    WBC 10.59 4.80 - 10.80 10*3/mm3    RBC 5.05 4.20 - 5.40 10*6/mm3    Hemoglobin 13.0 12.0 - 16.0 g/dL    Hematocrit 39.9 37.0 - 47.0 %    MCV 79.0 (L) 82.0 - 98.0 fL    MCH 25.7 (L) 28.0 - 32.0 pg    MCHC 32.6 (L) 33.0 - 36.0 g/dL    RDW 14.0 12.0 - 15.0 %    RDW-SD 39.9 (L) 40.0 - 54.0 fl    MPV 10.3 6.0 - 12.0 fL    Platelets 321 130 - 400 10*3/mm3    Neutrophil % 70.3 39.0 - 78.0 %    Lymphocyte % 22.5 15.0 - 45.0 %    Monocyte % 4.9 4.0 - 12.0 %    Eosinophil % 1.2 0.0 - 4.0 %    Basophil % 0.7 0.0 - 2.0 %    Immature Grans % 0.4 0.0 - 5.0 %    Neutrophils, Absolute 7.45 1.87 - 8.40 10*3/mm3    Lymphocytes, Absolute 2.38 0.72 - 4.86 10*3/mm3    Monocytes, Absolute 0.52 0.19 - 1.30 10*3/mm3    Eosinophils, Absolute 0.13 0.00 - 0.70 10*3/mm3    Basophils, Absolute 0.07 0.00 - 0.20 10*3/mm3    Immature Grans, Absolute 0.04 (H) 0.00 - 0.03 10*3/mm3    nRBC 0.0 0.0 - 0.0 /100 WBC   Magnesium   Result Value Ref Range    Magnesium 1.9 1.4 - 2.2 mg/dL   TSH   Result Value Ref Range    TSH 2.940 0.470 - 4.680 mIU/mL   Hemoglobin A1c   Result Value Ref Range    Hemoglobin A1C 5.7 %   Vitamin B12   Result Value Ref Range    Vitamin B-12 646 239 - 931 pg/mL   Troponin   Result Value Ref Range    Troponin I <0.012 0.000 - 0.034 ng/mL   Troponin   Result Value Ref Range    Troponin I <0.012 0.000 - 0.034 ng/mL   Urinalysis With / Microscopic If Indicated - Urine, Clean Catch   Result Value Ref Range    Color, UA Yellow Yellow, Straw    Appearance, UA Clear Clear    pH, UA 5.5 5.0 - 8.0    Specific Gravity, UA 1.012 1.005 -  1.030    Glucose, UA Negative Negative    Ketones, UA Negative Negative    Bilirubin, UA Negative Negative    Blood, UA Trace (A) Negative    Protein, UA Negative Negative    Leuk Esterase, UA Moderate (2+) (A) Negative    Nitrite, UA Negative Negative    Urobilinogen, UA 0.2 E.U./dL 0.2 - 1.0 E.U./dL   Basic Metabolic Panel   Result Value Ref Range    Glucose 85 70 - 100 mg/dL    BUN 7 5 - 21 mg/dL    Creatinine 0.95 0.50 - 1.40 mg/dL    Sodium 141 135 - 145 mmol/L    Potassium 4.0 3.5 - 5.3 mmol/L    Chloride 106 98 - 110 mmol/L    CO2 25.0 24.0 - 31.0 mmol/L    Calcium 8.9 8.4 - 10.4 mg/dL    eGFR Non African Amer 66 >60 mL/min/1.73    BUN/Creatinine Ratio 7.4 7.0 - 25.0    Anion Gap 10.0 4.0 - 13.0 mmol/L   CBC Auto Differential   Result Value Ref Range    WBC 9.53 4.80 - 10.80 10*3/mm3    RBC 4.82 4.20 - 5.40 10*6/mm3    Hemoglobin 12.6 12.0 - 16.0 g/dL    Hematocrit 37.5 37.0 - 47.0 %    MCV 77.8 (L) 82.0 - 98.0 fL    MCH 26.1 (L) 28.0 - 32.0 pg    MCHC 33.6 33.0 - 36.0 g/dL    RDW 13.7 12.0 - 15.0 %    RDW-SD 39.1 (L) 40.0 - 54.0 fl    MPV 9.8 6.0 - 12.0 fL    Platelets 280 130 - 400 10*3/mm3    Neutrophil % 59.9 39.0 - 78.0 %    Lymphocyte % 31.0 15.0 - 45.0 %    Monocyte % 5.9 4.0 - 12.0 %    Eosinophil % 2.4 0.0 - 4.0 %    Basophil % 0.6 0.0 - 2.0 %    Immature Grans % 0.2 0.0 - 5.0 %    Neutrophils, Absolute 5.71 1.87 - 8.40 10*3/mm3    Lymphocytes, Absolute 2.95 0.72 - 4.86 10*3/mm3    Monocytes, Absolute 0.56 0.19 - 1.30 10*3/mm3    Eosinophils, Absolute 0.23 0.00 - 0.70 10*3/mm3    Basophils, Absolute 0.06 0.00 - 0.20 10*3/mm3    Immature Grans, Absolute 0.02 0.00 - 0.03 10*3/mm3    nRBC 0.0 0.0 - 0.0 /100 WBC   Urinalysis, Microscopic Only - Urine, Clean Catch   Result Value Ref Range    RBC, UA 3-5 (A) None Seen /HPF    WBC, UA 21-30 (A) None Seen /HPF    Bacteria, UA 1+ (A) None Seen /HPF    Squamous Epithelial Cells, UA 0-2 None Seen, 0-2 /HPF    Hyaline Casts, UA None Seen None Seen /LPF     Methodology Automated Microscopy    Light Blue Top   Result Value Ref Range    Extra Tube hold for add-on    Green Top (Gel)   Result Value Ref Range    Extra Tube Hold for add-ons.    Lavender Top   Result Value Ref Range    Extra Tube hold for add-on    Red Top   Result Value Ref Range    Extra Tube Hold for add-ons.         ASSESSMENT/PLAN    Diagnoses and all orders for this visit:    Intractable chronic migraine without aura and without status migrainosus    Other orders  -     prochlorperazine (COMPAZINE) 5 MG tablet; Take 1 tablet by mouth Every 8 (Eight) Hours As Needed for Nausea or Vomiting.  -     SUMAtriptan (IMITREX) 100 MG tablet; Take 1 tablet by mouth 1 (One) Time As Needed for Migraine (Do not exceed 2 in 24 hours.).      MEDICAL DECISION MAKIN. Continue with Keppra 500 mg in AM and 1000 mg PM for migraine prophylaxis.  2. Continue  Namenda 5 mg BID and counseled on side effects  3. Continue with imitrex and prn naprosyn   4. Will continue Commpazine to take with treximet as abortive agent.  5. Does not use tobacco  6. Will trial Aimovig and counseled on side effects and use of auto injector.      7. If patient migraines worsens and progresses that required alternative therapy, Dr. Matthew and I have previously discussed will use DHE over that of migraine cocktail and DHE can be done in the infusion center     allergies and all known medications/prescriptions have been reviewed using resources available on this encounter.    Return in about 3 months (around 10/20/2018).        HOLLY Loja

## 2018-09-04 RX ORDER — MEMANTINE HYDROCHLORIDE 5 MG/1
TABLET ORAL
Qty: 60 TABLET | Refills: 5 | Status: SHIPPED | OUTPATIENT
Start: 2018-09-04 | End: 2018-11-05 | Stop reason: SDUPTHER

## 2018-11-02 ENCOUNTER — OFFICE VISIT (OUTPATIENT)
Dept: NEUROLOGY | Facility: CLINIC | Age: 38
End: 2018-11-02

## 2018-11-02 VITALS
BODY MASS INDEX: 42.14 KG/M2 | HEIGHT: 62 IN | HEART RATE: 78 BPM | WEIGHT: 229 LBS | DIASTOLIC BLOOD PRESSURE: 84 MMHG | SYSTOLIC BLOOD PRESSURE: 126 MMHG

## 2018-11-02 DIAGNOSIS — G43.719 INTRACTABLE CHRONIC MIGRAINE WITHOUT AURA AND WITHOUT STATUS MIGRAINOSUS: Primary | ICD-10-CM

## 2018-11-02 PROCEDURE — 99213 OFFICE O/P EST LOW 20 MIN: CPT | Performed by: CLINICAL NURSE SPECIALIST

## 2018-11-02 RX ORDER — NAPROXEN 500 MG/1
500 TABLET ORAL 2 TIMES DAILY PRN
Qty: 30 TABLET | Refills: 5 | Status: SHIPPED | OUTPATIENT
Start: 2018-11-02 | End: 2020-06-12 | Stop reason: SDUPTHER

## 2018-11-02 RX ORDER — LEVETIRACETAM 500 MG/1
TABLET ORAL
Qty: 90 TABLET | Refills: 5 | Status: SHIPPED | OUTPATIENT
Start: 2018-11-02 | End: 2019-04-26 | Stop reason: SDUPTHER

## 2018-11-02 NOTE — PROGRESS NOTES
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 She was last seen 7/2018. She was started on aimovig and had her 3rd does of 70 mg yesterday. She reports that she feels like she in nearing a 50% reduction in number of HAs. Has had 1-2 mild HA  Lasting all day but not usually into the next day, per week and in the last 2-3 weeks has had 4 severe HAs. These HAs lasted about 4 hours. DId use imitrex and compazine and naprosyn and did get relief. Did miss work once since last visit but was also being treated for URI. Intensity is the same. Has used less imitrex since starting Aimovig. Now having more imitrex available at end of each month. No side effects. Denies injection site reaction and no constipation.       She does take naprosyn for more milder Migraines but with the most severe will take imitrex, naprosyn and compazine. She also takes Namenda and feels this is also benefit. She continues with Keppra 500 mg Am and 1000 mg PM. Patient reports swings in weather as a trigger.       Migraine    This is a chronic problem. The current episode started more than 1 year ago (while in college). The problem occurs daily. Progression since onset: significant improvement with indocin and has had one severe headache lastin 5 days with nausea/vomiting. The pain is located in the occipital and bilateral (as well as right side of head) region. The quality of the pain is described as stabbing and sharp. The pain is at a severity of 8/10. The pain is severe. Associated symptoms include nausea, numbness (occasional tingling in feet at times), phonophobia, photophobia and vomiting. Pertinent negatives include no dizziness, fever, neck pain, rhinorrhea, sore throat or weakness. The symptoms are aggravated by weather changes and menstrual cycle. Treatments tried: topamax, vimpat, maxalt,  relpax, elavil, fioricet, DHE, Keppra, Indocin, botox, zomig, DHE, compazine. The treatment provided moderate relief.        Current Outpatient Prescriptions   Medication Sig Dispense Refill   • cyclobenzaprine (FLEXERIL) 10 MG tablet Take 10 mg by mouth 3 (Three) Times a Day As Needed for Muscle Spasms.     • hydrOXYzine (ATARAX) 25 MG tablet Take 25 mg by mouth Daily.  1   • levETIRAcetam (KEPPRA) 500 MG tablet Take 1 tablet in AM and 2 tablets in PM 90 tablet 5   • Levonorgest-Eth Estrad 91-Day (AMETHIA) 0.15-0.03 &0.01 MG tablet Take 1 tablet by mouth Daily.     • levothyroxine (SYNTHROID, LEVOTHROID) 25 MCG tablet Take 25 mcg by mouth daily.     • memantine (NAMENDA) 5 MG tablet TAKE 1 TABLET BY MOUTH TWICE DAILY 60 tablet 5   • naproxen (NAPROSYN) 500 MG tablet Take 1 tablet by mouth 2 (Two) Times a Day As Needed for Moderate Pain  or Headache. 30 tablet 5   • pregabalin (LYRICA) 75 MG capsule Take 75 mg by mouth 3 (Three) Times a Day.     • prochlorperazine (COMPAZINE) 5 MG tablet TAKE 1 TABLET BY MOUTH EVERY 8 HOURS AS NEEDED FOR NAUSEA OR VOMITING 60 tablet 1   • promethazine (PHENERGAN) 25 MG tablet Take 25 mg by mouth Every 6 (Six) Hours As Needed for Nausea or Vomiting.     • SUMAtriptan (IMITREX) 100 MG tablet Take 1 tablet by mouth 1 (One) Time As Needed for Migraine (Do not exceed 2 in 24 hours.). 9 tablet 5   • Erenumab-aooe (AIMOVIG 140 DOSE) 70 MG/ML prefilled syringe Inject 1 mL under the skin into the appropriate area as directed 1 (One) Time for 1 dose. 1 mL 0   • Erenumab-aooe (AIMOVIG 140 DOSE) 70 MG/ML prefilled syringe Inject 2 mL under the skin into the appropriate area as directed 1 (One) Time for 1 dose. 2 mL 5     No current facility-administered medications for this visit.        Past Medical History:   Diagnosis Date   • Fibromyalgia    • Migraines        Past Surgical History:   Procedure Laterality Date   •  SECTION         family history includes No Known Problems in her  "father and mother.    Social History   Substance Use Topics   • Smoking status: Never Smoker   • Smokeless tobacco: Never Used   • Alcohol use No       Review of Systems   Constitutional: Positive for fatigue. Negative for fever.   HENT: Negative for postnasal drip, rhinorrhea and sore throat.    Eyes: Positive for photophobia.   Respiratory: Negative.  Negative for apnea, choking and shortness of breath.    Cardiovascular: Negative.  Negative for chest pain.   Gastrointestinal: Positive for constipation, nausea and vomiting. Negative for diarrhea.        Recently having sensation as though \"food stuck\"   Endocrine: Negative.    Genitourinary: Negative.  Negative for dysuria and frequency.   Musculoskeletal: Positive for arthralgias. Negative for gait problem, joint swelling, myalgias and neck pain.   Skin: Negative.    Allergic/Immunologic: Negative.    Neurological: Positive for numbness (occasional tingling in feet at times) and headaches. Negative for dizziness, tremors, speech difficulty and weakness.   Hematological: Negative.  Negative for adenopathy.   Psychiatric/Behavioral: Negative.  Negative for agitation, confusion and hallucinations.   All other systems reviewed and are negative.      Objective     /84   Pulse 78   Ht 157.5 cm (62\")   Wt 104 kg (229 lb)   BMI 41.88 kg/m² , Body mass index is 41.88 kg/m².    Physical Exam   Constitutional: She is oriented to person, place, and time. Vital signs are normal. She appears well-developed and well-nourished. She is cooperative.   HENT:   Head: Normocephalic and atraumatic.   Right Ear: Hearing and external ear normal.   Left Ear: Hearing and external ear normal.   Nose: Nose normal.   Mouth/Throat: Uvula is midline, oropharynx is clear and moist and mucous membranes are normal.   Eyes: Pupils are equal, round, and reactive to light. Conjunctivae, EOM and lids are normal. Right eye exhibits normal extraocular motion and no nystagmus. Left eye " exhibits normal extraocular motion and no nystagmus. Right pupil is round and reactive. Left pupil is round and reactive. Pupils are equal.   Neck: Trachea normal and normal range of motion. Neck supple. Carotid bruit is not present.   Cardiovascular: Normal rate, regular rhythm and normal heart sounds.    No murmur heard.  Pulmonary/Chest: Effort normal and breath sounds normal. She has no decreased breath sounds. She has no rhonchi.   Abdominal: Soft. Bowel sounds are normal.   Musculoskeletal: Normal range of motion.   Neurological: She is alert and oriented to person, place, and time. She has normal strength and normal reflexes. No cranial nerve deficit or sensory deficit. She displays a negative Romberg sign.   Reflex Scores:       Tricep reflexes are 2+ on the right side and 2+ on the left side.       Bicep reflexes are 2+ on the right side and 2+ on the left side.       Brachioradialis reflexes are 2+ on the right side and 2+ on the left side.       Patellar reflexes are 2+ on the right side and 2+ on the left side.       Achilles reflexes are 2+ on the right side and 2+ on the left side.  CN II:  Visual fields full.  Pupils equally reactive to light  CN III, IV, VI:  Extraocular Muscles full with no signs of nystagmus  CN V:  Facial sensory is symmetric with no asymetries.  CN VII:  Facial motor symmetric  CN VIII:  Gross hearing intact bilaterally  CN IX:  Palate elevates symmetrically  CN X:  Palate elevates symmetrically  CN XI:  Shoulder shrug symmetric  CN XII:  Tongue is midline on protrusion    Full and symmetric strength bilateral upper and lower extremities   Skin: Skin is warm and dry.   Psychiatric: She has a normal mood and affect. Her speech is normal and behavior is normal. Cognition and memory are normal.   Nursing note and vitals reviewed.           ASSESSMENT/PLAN    Diagnoses and all orders for this visit:    Intractable chronic migraine without aura and without status migrainosus    Other  orders  -     Discontinue: Erenumab-aooe (AIMOVIG) 70 MG/ML prefilled syringe; Inject 70 mg under the skin into the appropriate area as directed Every 30 (Thirty) Days.  -     Erenumab-aooe (AIMOVIG 140 DOSE) 70 MG/ML prefilled syringe; Inject 1 mL under the skin into the appropriate area as directed 1 (One) Time for 1 dose.  -     Erenumab-aooe (AIMOVIG 140 DOSE) 70 MG/ML prefilled syringe; Inject 2 mL under the skin into the appropriate area as directed 1 (One) Time for 1 dose.  -     levETIRAcetam (KEPPRA) 500 MG tablet; Take 1 tablet in AM and 2 tablets in PM  -     naproxen (NAPROSYN) 500 MG tablet; Take 1 tablet by mouth 2 (Two) Times a Day As Needed for Moderate Pain  or Headache.      MEDICAL DECISION MAKIN. Continue with Keppra 500 mg in AM and 1000 mg PM for migraine prophylaxis.  2. Continue  Namenda 5 mg BID and counseled on side effects  3. Continue with imitrex and prn naprosyn   4. Will continue Commpazine to take with treximet as abortive agent.  5. Does not use tobacco  6. Increase Aimovig to 140 mg . Counseled in the studies reported improvement after 4-6 months of 50% reduction in number of HA/migraines.       7. If patient migraines worsens and progresses that required alternative therapy, Dr. Matthew and I have previously discussed will use DHE over that of migraine cocktail and DHE can be done in the infusion center    HPI and ROS reviewed and updated.      allergies and all known medications/prescriptions have been reviewed using resources available on this encounter.    Return in about 3 months (around 2019).        HOLLY Loja

## 2018-11-05 RX ORDER — LEVETIRACETAM 500 MG/1
TABLET ORAL
Qty: 90 TABLET | Refills: 0 | OUTPATIENT
Start: 2018-11-05

## 2018-11-05 RX ORDER — MEMANTINE HYDROCHLORIDE 5 MG/1
5 TABLET ORAL DAILY
Qty: 60 TABLET | Refills: 5 | Status: SHIPPED | OUTPATIENT
Start: 2018-11-05 | End: 2019-03-13 | Stop reason: SDUPTHER

## 2019-01-11 ENCOUNTER — OFFICE VISIT (OUTPATIENT)
Dept: GASTROENTEROLOGY | Facility: CLINIC | Age: 39
End: 2019-01-11

## 2019-01-11 VITALS
WEIGHT: 228 LBS | OXYGEN SATURATION: 99 % | DIASTOLIC BLOOD PRESSURE: 94 MMHG | TEMPERATURE: 98 F | HEIGHT: 62 IN | SYSTOLIC BLOOD PRESSURE: 136 MMHG | HEART RATE: 84 BPM | BODY MASS INDEX: 41.96 KG/M2

## 2019-01-11 DIAGNOSIS — R10.13 EPIGASTRIC PAIN: ICD-10-CM

## 2019-01-11 DIAGNOSIS — R13.19 OTHER DYSPHAGIA: Primary | ICD-10-CM

## 2019-01-11 DIAGNOSIS — R12 HEARTBURN: ICD-10-CM

## 2019-01-11 PROCEDURE — 99214 OFFICE O/P EST MOD 30 MIN: CPT | Performed by: NURSE PRACTITIONER

## 2019-01-11 NOTE — PROGRESS NOTES
"Chief Complaint:   Chief Complaint   Patient presents with   • Difficulty Swallowing   • Heartburn         Patient ID: Talita English is a 39 y.o. female     History of Present Illness: This is a very pleasant 39-year-old female who comes today with complaints of difficulty swallowing along with heartburn.    The patient states that it Feels like food and pills \"get stuck\". She states that for about 4 months having heartburn off and on . Tums has helped some.  The patient states she has also had some associated epigastric pain.  She states that Prilosec and Nexium caused her to vomit.  She also states that she has recently had a generalized rash and was told by PCP it could be caused by gluten and question to check for this.     The patient denies any nausea, vomiting,  or hematemesis.  patient denies any fever or chills.  Denies any melena or hematochezia.  Denies any unintentional weight loss or loss of appetite.  Only takes Naproxen rarely.     Past Medical History:   Diagnosis Date   • Fibromyalgia    • GERD (gastroesophageal reflux disease)    • Migraines        Past Surgical History:   Procedure Laterality Date   •  SECTION     •  SECTION           Current Outpatient Medications:   •  cyclobenzaprine (FLEXERIL) 10 MG tablet, Take 10 mg by mouth 3 (Three) Times a Day As Needed for Muscle Spasms., Disp: , Rfl:   •  Erenumab-aooe (AIMOVIG SC), Inject  under the skin into the appropriate area as directed Every 30 (Thirty) Days., Disp: , Rfl:   •  hydrOXYzine (ATARAX) 25 MG tablet, Take 25 mg by mouth Daily., Disp: , Rfl: 1  •  levETIRAcetam (KEPPRA) 500 MG tablet, Take 1 tablet in AM and 2 tablets in PM, Disp: 90 tablet, Rfl: 5  •  Levonorgest-Eth Estrad -Day (AMETHIA) 0.15-0.03 &0.01 MG tablet, Take 1 tablet by mouth Daily., Disp: , Rfl:   •  levothyroxine (SYNTHROID, LEVOTHROID) 25 MCG tablet, Take 25 mcg by mouth daily., Disp: , Rfl:   •  memantine (NAMENDA) 5 MG tablet, TAKE 1 TABLET BY " MOUTH DAILY, Disp: 60 tablet, Rfl: 5  •  naproxen (NAPROSYN) 500 MG tablet, Take 1 tablet by mouth 2 (Two) Times a Day As Needed for Moderate Pain  or Headache., Disp: 30 tablet, Rfl: 5  •  pregabalin (LYRICA) 75 MG capsule, Take 75 mg by mouth 3 (Three) Times a Day., Disp: , Rfl:   •  prochlorperazine (COMPAZINE) 5 MG tablet, TAKE 1 TABLET BY MOUTH EVERY 8 HOURS AS NEEDED FOR NAUSEA OR VOMITING, Disp: 60 tablet, Rfl: 1  •  promethazine (PHENERGAN) 25 MG tablet, Take 25 mg by mouth Every 6 (Six) Hours As Needed for Nausea or Vomiting., Disp: , Rfl:   •  SUMAtriptan (IMITREX) 100 MG tablet, Take 1 tablet by mouth 1 (One) Time As Needed for Migraine (Do not exceed 2 in 24 hours.)., Disp: 9 tablet, Rfl: 5    Allergies   Allergen Reactions   • Codeine Nausea And Vomiting   • Maxalt [Rizatriptan Benzoate] Nausea Only   • Relpax [Eletriptan] Nausea Only   • Elavil [Amitriptyline Hcl] Rash       Social History     Socioeconomic History   • Marital status:      Spouse name: Not on file   • Number of children: Not on file   • Years of education: Not on file   • Highest education level: Not on file   Social Needs   • Financial resource strain: Not on file   • Food insecurity - worry: Not on file   • Food insecurity - inability: Not on file   • Transportation needs - medical: Not on file   • Transportation needs - non-medical: Not on file   Occupational History   • Not on file   Tobacco Use   • Smoking status: Never Smoker   • Smokeless tobacco: Never Used   Substance and Sexual Activity   • Alcohol use: No   • Drug use: No   • Sexual activity: Defer   Other Topics Concern   • Not on file   Social History Narrative   • Not on file       Family History   Problem Relation Age of Onset   • No Known Problems Mother    • No Known Problems Father    • Colon cancer Neg Hx    • Colon polyps Neg Hx        Vitals:    01/11/19 0930   BP: 136/94   Pulse: 84   Temp: 98 °F (36.7 °C)   SpO2: 99%   Weight: 103 kg (228 lb)   Height:  "157.5 cm (62\")       Review of Systems:    General:    Present -feeling well   Skin:    Not Present-Rash   HEENT:     Not Present-Acute visual changes or Acute hearing changes   Neck :    Not Present- swollen glands   Genitourinary:      Not Present- burning, frequency, urgency hematuria, dysuria,   Cardiovascular:   Not Present-chest pain, palpitations, or pressure   Respiratory:   Not Present- shortness of breath or cough   Gastrointestinal:  Musculoskeletal:  Neurological:  Psychiatric:   Present as mentioned in the HP    Not Present. Recent gait disturbances.    Not Present-Seizures and weakness in extremities.    Not Present- Anxiety or Depression.       Physical Exam:    General Appearance:    Alert, cooperative, in no acute distress   Psych:    Mood appropriate    Eyes:          conjunctivae and sclerae normal, no   icterus, no pallor   ENMT:    Ears appear intact with no abnormalities noted oral mucosa moist   Neck:   No adenopathy, supple, trachea midline, no thyromegaly, no   carotid bruit, no JVD    Cardiovascular:    Regular rhythm and normal rate, normal S1 and S2, no            murmur, no gallop, no rub, no click   Gastrointestinal:     Inspection normal.  Normal bowel sounds, no masses, no organomegaly, soft round non-tender, non-distended, no guarding, no rebound or tenderness. No hepatosplenomegaly.   Skin:   No bleeding, bruising or rash   Neurologic:   nonfocal       Lab Results   Component Value Date    WBC 9.53 11/30/2017    WBC 10.59 11/29/2017    WBC 23.18 (H) 04/14/2015    HGB 12.6 11/30/2017    HGB 13.0 11/29/2017    HGB 13.6 04/14/2015    HCT 37.5 11/30/2017    HCT 39.9 11/29/2017    HCT 41.2 04/14/2015     11/30/2017     11/29/2017     04/14/2015        Lab Results   Component Value Date     11/30/2017     11/29/2017     04/14/2015    K 4.0 11/30/2017    K 4.1 11/29/2017    K 4.6 04/14/2015     11/30/2017     11/29/2017     " 04/14/2015    CO2 25.0 11/30/2017    CO2 27.0 11/29/2017    CO2 23 (L) 04/14/2015    BUN 7 11/30/2017    BUN 7 11/29/2017    BUN 11 04/14/2015    CREATININE 0.95 11/30/2017    CREATININE 0.91 11/29/2017    CREATININE 0.90 11/03/2017    BILITOT 0.4 11/29/2017    BILITOT 0.6 04/12/2015    BILITOT 0.5 03/04/2014    ALKPHOS 86 11/29/2017    ALKPHOS 91 04/12/2015    ALKPHOS 58 03/04/2014    ALT 28 11/29/2017    ALT 28 04/12/2015    ALT 33 03/04/2014    AST 32 11/29/2017    AST 31 04/12/2015    AST 33 03/04/2014    GLUCOSE 85 11/30/2017    GLUCOSE 87 11/29/2017    GLUCOSE 151 (H) 04/14/2015       Lab Results   Component Value Date    INR 0.94 04/12/2015       Body mass index is 41.7 kg/m². Patient's Body mass index is 41.7 kg/m². BMI is above normal parameters. Recommendations include: no follow-up required.    Assessment and Plan:  Assessment/Plan   Talita was seen today for difficulty swallowing and heartburn.    Diagnoses and all orders for this visit:    Other dysphagia  Comments:  Schedule EGD.  Patient states she is not able to take PPI.  Encouraged her to start over-the-counter ranitidine.  Orders:  -     Case Request; Standing  -     Case Request    Heartburn  -     Case Request; Standing  -     Case Request    Epigastric pain  -     Case Request; Standing  -     Case Request    Other orders  -     Follow Anesthesia Guidelines / Standing Orders; Future  -     Implement Anesthesia Orders Day of Procedure; Standing  -     Obtain Informed Consent; Standing             There are no Patient Instructions on file for this visit.    Next follow-up appointment      The risks, benefits, and alternatives of endoscopy were reviewed with the patient today.  Risks including perforation, with or without dilation, possibly requiring surgery.  Additional risks include risk of bleeding.  There is also the risk of a drug reaction or problems with anesthesia.  This will be discussed with the further by the anesthesia team on the  day of the procedure. The benefits include the diagnosis and management of disease of the upper digestive tract.  Alternatives to endoscopy include upper GI series, laboratory testing, radiographic evaluation, or no intervention.  The patient verbalizes understanding and agrees.      EMR Dragon/Transcription disclaimer:  Much of this encounter note is an electronic transcription/translation of spoken language to printed text. The electronic translation of spoken language may permit erroneous, or at times, nonsensical words or phrases to be inadvertently transcribed; although I have reviewed the note for such errors, some may still exist.

## 2019-01-18 ENCOUNTER — ANESTHESIA EVENT (OUTPATIENT)
Dept: GASTROENTEROLOGY | Facility: HOSPITAL | Age: 39
End: 2019-01-18

## 2019-01-18 ENCOUNTER — HOSPITAL ENCOUNTER (OUTPATIENT)
Facility: HOSPITAL | Age: 39
Setting detail: HOSPITAL OUTPATIENT SURGERY
Discharge: HOME | End: 2019-01-18
Attending: INTERNAL MEDICINE | Admitting: INTERNAL MEDICINE

## 2019-01-18 ENCOUNTER — ANESTHESIA (OUTPATIENT)
Dept: GASTROENTEROLOGY | Facility: HOSPITAL | Age: 39
End: 2019-01-18

## 2019-01-18 VITALS
DIASTOLIC BLOOD PRESSURE: 94 MMHG | BODY MASS INDEX: 35.63 KG/M2 | TEMPERATURE: 97.9 F | RESPIRATION RATE: 13 BRPM | HEART RATE: 80 BPM | WEIGHT: 227 LBS | SYSTOLIC BLOOD PRESSURE: 129 MMHG | OXYGEN SATURATION: 100 % | HEIGHT: 67 IN

## 2019-01-18 DIAGNOSIS — R12 HEARTBURN: ICD-10-CM

## 2019-01-18 DIAGNOSIS — R13.19 OTHER DYSPHAGIA: ICD-10-CM

## 2019-01-18 DIAGNOSIS — R10.13 EPIGASTRIC PAIN: ICD-10-CM

## 2019-01-18 LAB — B-HCG UR QL: NEGATIVE

## 2019-01-18 PROCEDURE — 43248 EGD GUIDE WIRE INSERTION: CPT | Performed by: INTERNAL MEDICINE

## 2019-01-18 PROCEDURE — 43239 EGD BIOPSY SINGLE/MULTIPLE: CPT | Performed by: INTERNAL MEDICINE

## 2019-01-18 PROCEDURE — 88305 TISSUE EXAM BY PATHOLOGIST: CPT | Performed by: INTERNAL MEDICINE

## 2019-01-18 PROCEDURE — 81025 URINE PREGNANCY TEST: CPT | Performed by: ANESTHESIOLOGY

## 2019-01-18 PROCEDURE — 25010000002 PROPOFOL 10 MG/ML EMULSION: Performed by: NURSE ANESTHETIST, CERTIFIED REGISTERED

## 2019-01-18 RX ORDER — SODIUM CHLORIDE 0.9 % (FLUSH) 0.9 %
3 SYRINGE (ML) INJECTION AS NEEDED
Status: DISCONTINUED | OUTPATIENT
Start: 2019-01-18 | End: 2019-01-18 | Stop reason: HOSPADM

## 2019-01-18 RX ORDER — PROPOFOL 10 MG/ML
VIAL (ML) INTRAVENOUS AS NEEDED
Status: DISCONTINUED | OUTPATIENT
Start: 2019-01-18 | End: 2019-01-18 | Stop reason: SURG

## 2019-01-18 RX ORDER — SODIUM CHLORIDE 9 MG/ML
500 INJECTION, SOLUTION INTRAVENOUS CONTINUOUS PRN
Status: DISCONTINUED | OUTPATIENT
Start: 2019-01-18 | End: 2019-01-18 | Stop reason: HOSPADM

## 2019-01-18 RX ORDER — RANITIDINE 150 MG/1
150 CAPSULE ORAL 2 TIMES DAILY
Qty: 60 CAPSULE | Refills: 11 | Status: SHIPPED | OUTPATIENT
Start: 2019-01-18 | End: 2019-03-04 | Stop reason: ALTCHOICE

## 2019-01-18 RX ORDER — LIDOCAINE HYDROCHLORIDE 20 MG/ML
INJECTION, SOLUTION INFILTRATION; PERINEURAL AS NEEDED
Status: DISCONTINUED | OUTPATIENT
Start: 2019-01-18 | End: 2019-01-18 | Stop reason: SURG

## 2019-01-18 RX ADMIN — PROPOFOL 50 MG: 10 INJECTION, EMULSION INTRAVENOUS at 14:06

## 2019-01-18 RX ADMIN — SODIUM CHLORIDE 500 ML: 9 INJECTION, SOLUTION INTRAVENOUS at 11:48

## 2019-01-18 RX ADMIN — PROPOFOL 50 MG: 10 INJECTION, EMULSION INTRAVENOUS at 14:05

## 2019-01-18 RX ADMIN — PROPOFOL 50 MG: 10 INJECTION, EMULSION INTRAVENOUS at 14:04

## 2019-01-18 RX ADMIN — LIDOCAINE HYDROCHLORIDE 50 MG: 20 INJECTION, SOLUTION INFILTRATION; PERINEURAL at 14:04

## 2019-01-18 RX ADMIN — PROPOFOL 50 MG: 10 INJECTION, EMULSION INTRAVENOUS at 14:07

## 2019-01-18 NOTE — ANESTHESIA POSTPROCEDURE EVALUATION
Patient: Talita English    Procedure Summary     Date:  01/18/19 Room / Location:  [facility] ENDOSCOPY 6 / [facility]    Anesthesia Start:  1403 Anesthesia Stop:  1416    Procedure:  ESOPHAGOGASTRODUODENOSCOPY WITH ANESTHESIA (N/A ) Diagnosis:       Other dysphagia      Heartburn      Epigastric pain      (Other dysphagia [R13.19])      (Heartburn [R12])      (Epigastric pain [R10.13])    Surgeon:  Soha Wang MD Provider:  Kb Garzon CRNA    Anesthesia Type:  general ASA Status:  2          Anesthesia Type: general  Last vitals  BP   155/98 (01/18/19 1130)   Temp   97.9 °F (36.6 °C) (01/18/19 1130)   Pulse   86 (01/18/19 1130)   Resp   18 (01/18/19 1130)     SpO2   100 % (01/18/19 1130)     Post Anesthesia Care and Evaluation    Patient location during evaluation: PHASE II  Patient participation: complete - patient participated  Level of consciousness: awake and sleepy but conscious  Pain score: 0  Pain management: adequate  Airway patency: patent  Anesthetic complications: No anesthetic complications    Cardiovascular status: acceptable  Respiratory status: acceptable  Hydration status: acceptable

## 2019-01-18 NOTE — ANESTHESIA PREPROCEDURE EVALUATION
Anesthesia Evaluation     Patient summary reviewed   no history of anesthetic complications:  NPO Solid Status: > 8 hours  NPO Liquid Status: > 8 hours           Airway   Mallampati: II  TM distance: >3 FB  Neck ROM: full  No difficulty expected  Dental - normal exam     Pulmonary    (-) asthma, sleep apnea, not a smoker  Cardiovascular - negative cardio ROS  Exercise tolerance: good (4-7 METS)    (-) hypertension, past MI, CAD, angina, cardiac stents      Neuro/Psych  (+) headaches,     (-) seizures, TIA, CVA  GI/Hepatic/Renal/Endo    (+)  GERD,  hypothyroidism,   (-) no renal disease, diabetes    Musculoskeletal     (+) myalgias,   Abdominal    Substance History      OB/GYN          Other                        Anesthesia Plan    ASA 2     general   total IV anesthesia  intravenous induction   Anesthetic plan, all risks, benefits, and alternatives have been provided, discussed and informed consent has been obtained with: patient.

## 2019-01-21 LAB
CYTO UR: NORMAL
LAB AP CASE REPORT: NORMAL
LAB AP CLINICAL INFORMATION: NORMAL
PATH REPORT.FINAL DX SPEC: NORMAL
PATH REPORT.GROSS SPEC: NORMAL

## 2019-02-08 ENCOUNTER — LAB (OUTPATIENT)
Dept: LAB | Facility: HOSPITAL | Age: 39
End: 2019-02-08

## 2019-02-08 ENCOUNTER — OFFICE VISIT (OUTPATIENT)
Dept: NEUROLOGY | Facility: CLINIC | Age: 39
End: 2019-02-08

## 2019-02-08 VITALS
DIASTOLIC BLOOD PRESSURE: 88 MMHG | SYSTOLIC BLOOD PRESSURE: 132 MMHG | BODY MASS INDEX: 41.77 KG/M2 | HEIGHT: 62 IN | WEIGHT: 227 LBS | HEART RATE: 78 BPM

## 2019-02-08 DIAGNOSIS — R53.83 FATIGUE, UNSPECIFIED TYPE: Primary | ICD-10-CM

## 2019-02-08 DIAGNOSIS — R31.9 HEMATURIA, UNSPECIFIED TYPE: ICD-10-CM

## 2019-02-08 DIAGNOSIS — R13.10 DYSPHAGIA, UNSPECIFIED TYPE: ICD-10-CM

## 2019-02-08 DIAGNOSIS — E07.9 THYROID DISORDER: ICD-10-CM

## 2019-02-08 DIAGNOSIS — R49.9 CHANGE IN VOICE: ICD-10-CM

## 2019-02-08 DIAGNOSIS — R53.83 FATIGUE, UNSPECIFIED TYPE: ICD-10-CM

## 2019-02-08 DIAGNOSIS — R25.1 TREMOR: ICD-10-CM

## 2019-02-08 LAB
CK SERPL-CCNC: 104 U/L (ref 0–203)
IRON 24H UR-MRATE: 59 MCG/DL (ref 42–180)
IRON SATN MFR SERPL: 16 % (ref 20–45)
TIBC SERPL-MCNC: 379 MCG/DL (ref 225–420)

## 2019-02-08 PROCEDURE — 86255 FLUORESCENT ANTIBODY SCREEN: CPT | Performed by: CLINICAL NURSE SPECIALIST

## 2019-02-08 PROCEDURE — 83519 RIA NONANTIBODY: CPT | Performed by: CLINICAL NURSE SPECIALIST

## 2019-02-08 PROCEDURE — 36415 COLL VENOUS BLD VENIPUNCTURE: CPT

## 2019-02-08 PROCEDURE — 99214 OFFICE O/P EST MOD 30 MIN: CPT | Performed by: CLINICAL NURSE SPECIALIST

## 2019-02-08 PROCEDURE — 82550 ASSAY OF CK (CPK): CPT | Performed by: CLINICAL NURSE SPECIALIST

## 2019-02-08 PROCEDURE — 83550 IRON BINDING TEST: CPT | Performed by: CLINICAL NURSE SPECIALIST

## 2019-02-08 PROCEDURE — 83540 ASSAY OF IRON: CPT | Performed by: CLINICAL NURSE SPECIALIST

## 2019-02-08 NOTE — PROGRESS NOTES
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 She is here today for follow up for migraines. She was last seen 11/2018. She was started on aimovig and had her 6rd dose yesterday. Had increased her dose to 140 mg at last visit. She had been doing well and previously had felt like she in nearing a 50% reduction in number of HAs with Has had 1-2 mild HA  Lasting all day but not usually into the next day. However, since last visit, she has had under quite a bit of stress as mother in law passed away unexpectedly, the holidays came, and then her  had surgery. She continues with Aimovig and states injection in left leg will have some redness that last 30 minutes to 2 days. She had not been using all of imitrex monthly until about December and began to take more frequently.     She does take naprosyn for more milder Migraines but with the most severe will take imitrex, naprosyn and compazine. She also takes Namenda and feels this is also benefit. She continues with Keppra 500 mg Am and 1000 mg PM. Patient reports swings in weather as a trigger.     Patient reports also, she was having edema in right side of neck with difficulty swallowing and changes in voice. She was referred to GI and did undergo endoscopy and mild gastritis. Now taking zantac. She does have hx of thyroid disorder. Thyroid levels managed by PCP.  She also had labs including UA that showed moderate RBCs. She is concerned as father has hx of bladder cancer. She has noticed she has difficulty emptying her bladder and initiating stream.   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 She has had similar complaints of this in the past. Last EMG was 2015. Last MRI brain was 11/2017 and stable compared to 2015.     Migraine    This is a chronic problem. The current episode started more than  1 year ago (while in college). The problem occurs daily. Progression since onset: significant improvement with indocin and has had one severe headache lastin 5 days with nausea/vomiting. The pain is located in the occipital and bilateral (as well as right side of head) region. The quality of the pain is described as stabbing and sharp. The pain is at a severity of 8/10. The pain is severe. Associated symptoms include nausea, numbness (occasional tingling in feet at times), phonophobia, photophobia and vomiting. Pertinent negatives include no dizziness, fever, neck pain, rhinorrhea, sore throat or weakness. The symptoms are aggravated by weather changes and menstrual cycle. Treatments tried: topamax, vimpat, maxalt, relpax, elavil, fioricet, DHE, Keppra, Indocin, botox, zomig, DHE, compazine. The treatment provided moderate relief.        Current Outpatient Medications   Medication Sig Dispense Refill   • cyclobenzaprine (FLEXERIL) 10 MG tablet Take 10 mg by mouth 3 (Three) Times a Day As Needed for Muscle Spasms.     • Erenumab-aooe (AIMOVIG SC) Inject  under the skin into the appropriate area as directed Every 30 (Thirty) Days.     • hydrOXYzine (ATARAX) 25 MG tablet Take 25 mg by mouth Daily.  1   • levETIRAcetam (KEPPRA) 500 MG tablet Take 1 tablet in AM and 2 tablets in PM 90 tablet 5   • Levonorgest-Eth Estrad 91-Day (AMETHIA) 0.15-0.03 &0.01 MG tablet Take 1 tablet by mouth Daily.     • levothyroxine (SYNTHROID, LEVOTHROID) 25 MCG tablet Take 25 mcg by mouth daily.     • memantine (NAMENDA) 5 MG tablet TAKE 1 TABLET BY MOUTH DAILY 60 tablet 5   • naproxen (NAPROSYN) 500 MG tablet Take 1 tablet by mouth 2 (Two) Times a Day As Needed for Moderate Pain  or Headache. 30 tablet 5   • pregabalin (LYRICA) 75 MG capsule Take 75 mg by mouth 3 (Three) Times a Day.     • prochlorperazine (COMPAZINE) 5 MG tablet TAKE 1 TABLET BY MOUTH EVERY 8 HOURS AS NEEDED FOR NAUSEA OR VOMITING 60 tablet 1   • promethazine (PHENERGAN)  "25 MG tablet Take 25 mg by mouth Every 6 (Six) Hours As Needed for Nausea or Vomiting.     • ranitidine (ZANTAC) 150 MG capsule Take 1 capsule by mouth 2 (Two) Times a Day. 60 capsule 11   • SUMAtriptan (IMITREX) 100 MG tablet Take 1 tablet by mouth 1 (One) Time As Needed for Migraine (Do not exceed 2 in 24 hours.). 9 tablet 5     No current facility-administered medications for this visit.        Past Medical History:   Diagnosis Date   • Disease of thyroid gland    • Fibromyalgia    • GERD (gastroesophageal reflux disease)    • Migraines        Past Surgical History:   Procedure Laterality Date   •  SECTION     • ENDOSCOPY N/A 2019    Procedure: ESOPHAGOGASTRODUODENOSCOPY WITH ANESTHESIA;  Surgeon: Soha Wang MD;  Location: [facility] ENDOSCOPY;  Service: Gastroenterology       family history includes No Known Problems in her father and mother.    Social History     Tobacco Use   • Smoking status: Never Smoker   • Smokeless tobacco: Never Used   Substance Use Topics   • Alcohol use: No   • Drug use: No       Review of Systems   Constitutional: Positive for fatigue. Negative for fever.   HENT: Negative for postnasal drip, rhinorrhea and sore throat.    Eyes: Positive for photophobia.   Respiratory: Negative.  Negative for apnea, choking and shortness of breath.    Cardiovascular: Negative.  Negative for chest pain.   Gastrointestinal: Positive for constipation, nausea and vomiting. Negative for diarrhea.        Recently having sensation as though \"food stuck\"   Endocrine: Negative.  Negative for cold intolerance and heat intolerance.   Genitourinary: Negative.  Negative for dysuria and frequency.   Musculoskeletal: Positive for arthralgias. Negative for gait problem, joint swelling, myalgias and neck pain.   Skin: Negative.    Allergic/Immunologic: Negative.    Neurological: Positive for numbness (occasional tingling in feet at times) and headaches. Negative for dizziness, tremors, speech difficulty " "and weakness.   Hematological: Negative.  Negative for adenopathy.   Psychiatric/Behavioral: Negative.  Negative for agitation, confusion and hallucinations.   All other systems reviewed and are negative.      Objective     /88   Pulse 78   Ht 157.5 cm (62\")   Wt 103 kg (227 lb)   BMI 41.52 kg/m² , Body mass index is 41.52 kg/m².    Physical Exam   Constitutional: She is oriented to person, place, and time. Vital signs are normal. She appears well-developed and well-nourished. She is cooperative.   HENT:   Head: Normocephalic and atraumatic.   Right Ear: Hearing and external ear normal.   Left Ear: Hearing and external ear normal.   Nose: Nose normal.   Mouth/Throat: Uvula is midline, oropharynx is clear and moist and mucous membranes are normal.   Eyes: Conjunctivae, EOM and lids are normal. Pupils are equal, round, and reactive to light. Right eye exhibits normal extraocular motion and no nystagmus. Left eye exhibits normal extraocular motion and no nystagmus. Right pupil is round and reactive. Left pupil is round and reactive. Pupils are equal.   Neck: Trachea normal and normal range of motion. Neck supple. No muscular tenderness present. Carotid bruit is not present. No thyromegaly present.   Cardiovascular: Normal rate, regular rhythm and normal heart sounds.   No murmur heard.  Pulmonary/Chest: Effort normal and breath sounds normal. She has no decreased breath sounds. She has no rhonchi.   Abdominal: Soft. Bowel sounds are normal.   Musculoskeletal: Normal range of motion.   Neurological: She is alert and oriented to person, place, and time. She has normal strength and normal reflexes. She displays no atrophy and no tremor. No cranial nerve deficit or sensory deficit. She exhibits normal muscle tone. She displays a negative Romberg sign. Coordination and gait normal.   Reflex Scores:       Tricep reflexes are 2+ on the right side and 2+ on the left side.       Bicep reflexes are 2+ on the right side " and 2+ on the left side.       Brachioradialis reflexes are 2+ on the right side and 2+ on the left side.       Patellar reflexes are 2+ on the right side and 2+ on the left side.       Achilles reflexes are 2+ on the right side and 2+ on the left side.  CN II:  Visual fields full.  Pupils equally reactive to light  CN III, IV, VI:  Extraocular Muscles full with no signs of nystagmus  CN V:  Facial sensory is symmetric with no asymetries.  CN VII:  Facial motor symmetric  CN VIII:  Gross hearing intact bilaterally  CN IX:  Palate elevates symmetrically  CN X:  Palate elevates symmetrically  CN XI:  Shoulder shrug symmetric  CN XII:  Tongue is midline on protrusion    Full and symmetric strength bilateral upper and lower extremities   Skin: Skin is warm and dry.   Psychiatric: She has a normal mood and affect. Her speech is normal and behavior is normal. Cognition and memory are normal.   Nursing note and vitals reviewed.      Results for orders placed or performed during the hospital encounter of 01/18/19   Pregnancy, Urine - Urine, Clean Catch   Result Value Ref Range    HCG, Urine QL Negative Negative   Tissue Pathology Exam   Result Value Ref Range    Case Report       Surgical Pathology Report                         Case: [id number]                                  Authorizing Provider:  Soha Wang MD         Collected:           01/18/2019 02:07 PM          Ordering Location:     [facility]     Received:            01/18/2019 02:50 PM                                 ENDOSCOPY                                                                    Pathologist:           Leandro Valera MD                                                     Specimen:    Small Intestine, small bowel bx                                                            Clinical Information       Willie lara      Final Diagnosis       Small intestine, biopsy: Benign duodenal mucosa with patchy mild chronic nonspecific  "inflammation, negative for evidence of sprue.      Gross Description       Specimen #1 is received in a formalin filled container, labeled with the patient's name, date of birth, and \"small bowel biopsy\".  The specimen consists of 6 yellow-tan soft tissue fragments aggregating to 0.5 x 0.3 x 0.2 cm, totally submitted in block 1A.      Microscopic Description       Microscopic examination reveal sections of fragments of benign-appearing duodenal-type mucosa displaying mild chronic inflammation with no evidence of significant intraepithelial lymphocytosis or villous blunting.  No infectious organisms are identified and no atypical proliferative lesions are noted.            ASSESSMENT/PLAN    Diagnoses and all orders for this visit:    Fatigue, unspecified type  -     Iron Profile; Future  -     CK; Future  -     Ambulatory Referral to ENT (Otolaryngology)  -     EMG & Nerve Conduction Test; Future    Dysphagia, unspecified type  -     Myasthenia Gravis Full Panel; Future  -     Ambulatory Referral to ENT (Otolaryngology)  -     EMG & Nerve Conduction Test; Future    Change in voice  -     Ambulatory Referral to ENT (Otolaryngology)    Hematuria, unspecified type  -     Ambulatory Referral to Urology    Thyroid disorder  -     Ambulatory Referral to ENT (Otolaryngology)    Tremor  -     EMG & Nerve Conduction Test; Future    MEDICAL DECISION MAKIN. Continue with Keppra 500 mg in AM and 1000 mg PM for migraine prophylaxis.  2. Continue  Namenda 5 mg BID and counseled on side effects  3. Continue with imitrex and prn naprosyn   4. Will continue Compazine to take with imitrex as abortive agent.  5. Patient has voice change and difficulty swallowing. GI workup unremarkable. Also has hx of thyroid disorder. Will refer to ENT for evaluation.   6. continueAimovig to 140 mg . Counseled in the studies reported improvement after 4-6 months of 50% reduction in number of HA/migraines.       7. If patient migraines worsens " and progresses that required alternative therapy, Dr. Matthew and I have previously discussed will use DHE over that of migraine cocktail and DHE can be done in the infusion center  8. Refer to urology for hematuria and difficulty voiding  9. Obtain EMG/NCV as patient having tremors in hands with exertion and weakness  10. Check labs myasthenia panel, iron profile.     HPI and ROS reviewed and updated.        allergies and all known medications/prescriptions have been reviewed using resources available on this encounter.    Return in about 6 weeks (around 3/22/2019).        HOLLY Loja

## 2019-02-14 LAB
ACHR AB SER-SCNC: <0.03 NMOL/L (ref 0–0.24)
ACHR BLOCK AB/ACHR TOTAL SFR SER: 18 % (ref 0–25)
ACHR MOD AB/ACHR TOTAL SFR SER: <12 % (ref 0–20)
STRIA MUS AB TITR SER IF: NEGATIVE {TITER}

## 2019-03-01 ENCOUNTER — OFFICE VISIT (OUTPATIENT)
Dept: OTOLARYNGOLOGY | Facility: CLINIC | Age: 39
End: 2019-03-01

## 2019-03-01 VITALS
BODY MASS INDEX: 41.22 KG/M2 | SYSTOLIC BLOOD PRESSURE: 142 MMHG | DIASTOLIC BLOOD PRESSURE: 88 MMHG | TEMPERATURE: 98.1 F | HEIGHT: 62 IN | HEART RATE: 86 BPM | WEIGHT: 224 LBS

## 2019-03-01 DIAGNOSIS — M54.2 NECK PAIN: Primary | ICD-10-CM

## 2019-03-01 DIAGNOSIS — J35.1 TONSILLAR HYPERTROPHY: ICD-10-CM

## 2019-03-01 DIAGNOSIS — R07.0 THROAT PAIN: ICD-10-CM

## 2019-03-01 DIAGNOSIS — E01.0 THYROMEGALY: ICD-10-CM

## 2019-03-01 DIAGNOSIS — R22.1 NECK SWELLING: ICD-10-CM

## 2019-03-01 PROCEDURE — 99214 OFFICE O/P EST MOD 30 MIN: CPT | Performed by: NURSE PRACTITIONER

## 2019-03-01 PROCEDURE — 31575 DIAGNOSTIC LARYNGOSCOPY: CPT | Performed by: NURSE PRACTITIONER

## 2019-03-01 RX ORDER — PANTOPRAZOLE SODIUM 40 MG/1
TABLET, DELAYED RELEASE ORAL
Qty: 30 TABLET | Refills: 3 | Status: SHIPPED | OUTPATIENT
Start: 2019-03-01 | End: 2019-09-10 | Stop reason: ALTCHOICE

## 2019-03-01 NOTE — PROGRESS NOTES
OPERATIVE NOTE:    PROCEDURE:   Flexible Fiberoptic Laryngoscopy    PREPROCEDURE DIAGNOSIS  Persistent Throat pain  Neck swelling  Recurrent sore throats    POSTPROCEDURE DIAGNOSIS.  LPR    ANESTHESIA:  None    REASON FOR PROCEDURE:  Procedure was recommend for suspicious clinical behavior unresponsive to medical management, persistent throat pain, neck swelling, sore throats.  Risks, benefits and alternatives were discussed.      DETAILS of OPERATION:  The patient was seated in the exam chair.  A flexible fiberoptic laryngoscopy was performed through the oral cavity.  The scope was introduced into the oral cavity and directed to the level of the glottis, examining the structures of the oropharynx, base of tongue, vallecula, supraglottic larynx, glottic larynx, and hypopharynx.      FINDINGS:  Mucosal surfaces:   The mucosal surfaces demonstrated normal mucosa surfaces with inflammation    Base of tongue:  The base of tongue was found to have no mass or lesion.    Epiglottis:  The epiglottis was found to have no mass or lesion.    Aryepligottic fold:  The AE folds were found to have no mass or lesion.    False Vocal Fold:  The false cords were found to have no mass or lesion.    True Vocal Cord:  The true vocal cords were found to have no mass or lesion.    Arytenoid:   The arytenoids were found to have erythema R>L    Hypopharynx:  The hypopharynx was found to have no mass or lesion.    The patient tolerated procedure well.            Answers for HPI/ROS submitted by the patient on 2/27/2019   Sore throat  Chronicity: chronic  Onset: more than 1 month ago  Progression since onset: waxing and waning  Pain worse on: right  Fever: no fever  Fever duration: less than 1 day  Pain - numeric: 3/10  abdominal pain: No  congestion: No  cough: Yes  diarrhea: No  drooling: No  ear discharge: No  ear pain: No  headaches: Yes  hoarse voice: Yes  neck pain: Yes  plugged ear sensation: No  shortness of breath: Yes  stridor:  No  swollen glands: Yes  trouble swallowing: Yes  vomiting: No  strep: No  mono: No

## 2019-03-01 NOTE — PROGRESS NOTES
YOB: 1980  Location: [facility]  Location Address: [address] [place] [address], [facility], [address] [place] [address]  Location Phone: [phone]    Chief Complaint   Patient presents with   • Difficulty Swallowing       History of Present Illness  Talita English is a 39 y.o. female.  Talita English is here for evaluation of ENT complaints. The patient has had problems with throat complaints, sore throats, throat pain and neck pain, neck swelling  The symptoms are not localized to a particular location. The patient has had severe symptoms. The symptoms have been present for the last several months The symptoms are aggravated by  no identifiable factors. The symptoms are improved by no identifiable factors.       Past Medical History:   Diagnosis Date   • Disease of thyroid gland     hypothyroid   • Fibromyalgia    • GERD (gastroesophageal reflux disease)    • Migraines        Past Surgical History:   Procedure Laterality Date   •  SECTION     • ENDOSCOPY N/A 2019    Normal 1st and 2nd portions of the duodenum-biopsied; Normal stomach; LA grade A esophagitis; No esophageal abnormality to explain patient's dysphagia-esophagus dilated        Outpatient Medications Marked as Taking for the 3/1/19 encounter (Office Visit) with Leni Brown APRN   Medication Sig Dispense Refill   • cyclobenzaprine (FLEXERIL) 10 MG tablet Take 10 mg by mouth 3 (Three) Times a Day As Needed for Muscle Spasms.     • Erenumab-aooe (AIMOVIG SC) Inject  under the skin into the appropriate area as directed Every 30 (Thirty) Days.     • hydrOXYzine (ATARAX) 25 MG tablet Take 25 mg by mouth Daily.  1   • levETIRAcetam (KEPPRA) 500 MG tablet Take 1 tablet in AM and 2 tablets in PM 90 tablet 5   • Levonorgest-Eth Estrad 91-Day (AMETHIA) 0.15-0.03 &0.01 MG tablet Take 1 tablet by mouth Daily.     • levothyroxine (SYNTHROID, LEVOTHROID) 25 MCG tablet Take 25 mcg by mouth daily.     • memantine (NAMENDA) 5 MG tablet TAKE 1  TABLET BY MOUTH DAILY 60 tablet 5   • naproxen (NAPROSYN) 500 MG tablet Take 1 tablet by mouth 2 (Two) Times a Day As Needed for Moderate Pain  or Headache. 30 tablet 5   • pregabalin (LYRICA) 75 MG capsule Take 75 mg by mouth 3 (Three) Times a Day.     • prochlorperazine (COMPAZINE) 5 MG tablet TAKE 1 TABLET BY MOUTH EVERY 8 HOURS AS NEEDED FOR NAUSEA OR VOMITING 60 tablet 1   • promethazine (PHENERGAN) 25 MG tablet Take 25 mg by mouth Every 6 (Six) Hours As Needed for Nausea or Vomiting.     • ranitidine (ZANTAC) 150 MG capsule Take 1 capsule by mouth 2 (Two) Times a Day. 60 capsule 11   • SUMAtriptan (IMITREX) 100 MG tablet Take 1 tablet by mouth 1 (One) Time As Needed for Migraine (Do not exceed 2 in 24 hours.). 9 tablet 5       Codeine; Maxalt [rizatriptan benzoate]; Relpax [eletriptan]; and Elavil [amitriptyline hcl]    Family History   Problem Relation Age of Onset   • No Known Problems Mother    • No Known Problems Father    • Colon cancer Neg Hx    • Colon polyps Neg Hx        Social History     Socioeconomic History   • Marital status:      Spouse name: Not on file   • Number of children: Not on file   • Years of education: Not on file   • Highest education level: Not on file   Social Needs   • Financial resource strain: Not on file   • Food insecurity - worry: Not on file   • Food insecurity - inability: Not on file   • Transportation needs - medical: Not on file   • Transportation needs - non-medical: Not on file   Occupational History   • Not on file   Tobacco Use   • Smoking status: Never Smoker   • Smokeless tobacco: Never Used   Substance and Sexual Activity   • Alcohol use: No   • Drug use: No   • Sexual activity: Defer   Other Topics Concern   • Not on file   Social History Narrative   • Not on file       Review of Systems   Constitutional: Negative.    HENT: Positive for trouble swallowing. Negative for congestion, drooling, ear discharge and ear pain.         SEE HPI   Eyes: Negative.     Respiratory: Positive for cough and shortness of breath. Negative for stridor.    Cardiovascular: Negative.    Gastrointestinal: Negative.  Negative for abdominal pain, diarrhea and vomiting.   Endocrine: Negative.    Genitourinary: Negative.    Musculoskeletal: Positive for neck pain.   Skin: Negative.    Allergic/Immunologic: Negative.    Neurological: Positive for headaches.   Hematological: Negative.    Psychiatric/Behavioral: Negative.        Vitals:    03/01/19 0913   BP: 142/88   Pulse: 86   Temp: 98.1 °F (36.7 °C)       Body mass index is 40.97 kg/m².    Objective     Physical Exam  CONSTITUTIONAL: well nourished, alert, oriented, in no acute distress     COMMUNICATION AND VOICE: able to communicate normally, normal voice quality    HEAD: normocephalic, no lesions, atraumatic, no tenderness, no masses     FACE: appearance normal, no lesions, no tenderness, no deformities, facial motion symmetric    SALIVARY GLANDS: parotid glands with no tenderness, no swelling, no masses, submandibular glands with normal size, nontender    EYES: ocular motility normal, eyelids normal, orbits normal, no proptosis, conjunctiva normal , pupils equal, round     EARS:  Hearing: response to conversational voice normal bilaterally   External Ears: auricles without lesions  Otoscopic: tympanic membrane appearance normal, no lesions, no perforation, normal mobility, no fluid    NOSE:  External Nose: structure normal, no tenderness on palpation, no nasal discharge, no lesions, no evidence of trauma, nostrils patent   Intranasal Exam: nasal mucosa normal, vestibule within normal limits, inferior turbinate normal, nasal septum midline       ORAL:  Lips: upper and lower lips without lesion   Teeth: dentition within normal limits for age   Gums: gingivae healthy   Oral Mucosa: oral mucosa normal, no mucosal lesions   Floor of Mouth: Warthin’s duct patent, mucosa normal  Tongue: lingual mucosa normal without lesions, normal tongue  mobility   Palate: soft and hard palates with normal mucosa and structure  Oropharynx: oropharyngeal mucosa normal, tonsils enlarged cryptic    HYPOPHARYNX:   LARYNX: see scope    NECK: neck appearance normal, no mass,  noted without erythema or tenderness    THYROID: thyromegaly with right sided tenderness     LYMPH NODES: no lymphadenopathy    CHEST/RESPIRATORY: respiratory effort normal    CARDIOVASCULAR:, extremities without cyanosis or edema      NEUROLOGIC/PSYCHIATRIC: oriented to time, place and person, mood normal, affect appropriate, CN II-XII intact grossly    Assessment/Plan   Talita was seen today for difficulty swallowing.    Diagnoses and all orders for this visit:    Neck pain  -     CT Soft Tissue Neck With Contrast; Future    Neck swelling  -     CT Soft Tissue Neck With Contrast; Future    Throat pain  -     CT Soft Tissue Neck With Contrast; Future    Tonsillar hypertrophy    Thyromegaly    Other orders  -     pantoprazole (PROTONIX) 40 MG EC tablet; Take 40 mg by mouth 30 minute prior to breakfast      * Surgery not found *  Orders Placed This Encounter   Procedures   • CT Soft Tissue Neck With Contrast     Standing Status:   Future     Standing Expiration Date:   3/1/2020     Order Specific Question:   Patient Pregnant     Answer:   No     Return in about 4 weeks (around 3/29/2019).       Patient Instructions   Call for problems or worsening symptoms  Adjust meds    Gastroesophageal Reflux Disease, Adult  Normally, food travels down the esophagus and stays in the stomach to be digested. However, when a person has gastroesophageal reflux disease (GERD), food and stomach acid move back up into the esophagus. When this happens, the esophagus becomes sore and inflamed. Over time, GERD can create small holes (ulcers) in the lining of the esophagus.  What are the causes?  This condition is caused by a problem with the muscle between the esophagus and the stomach (lower esophageal sphincter, or LES).  Normally, the LES muscle closes after food passes through the esophagus to the stomach. When the LES is weakened or abnormal, it does not close properly, and that allows food and stomach acid to go back up into the esophagus. The LES can be weakened by certain dietary substances, medicines, and medical conditions, including:  · Tobacco use.  · Pregnancy.  · Having a hiatal hernia.  · Heavy alcohol use.  · Certain foods and beverages, such as coffee, chocolate, onions, and peppermint.    What increases the risk?  This condition is more likely to develop in:  · People who have an increased body weight.  · People who have connective tissue disorders.  · People who use NSAID medicines.    What are the signs or symptoms?  Symptoms of this condition include:  · Heartburn.  · Difficult or painful swallowing.  · The feeling of having a lump in the throat.  · A bitter taste in the mouth.  · Bad breath.  · Having a large amount of saliva.  · Having an upset or bloated stomach.  · Belching.  · Chest pain.  · Shortness of breath or wheezing.  · Ongoing (chronic) cough or a night-time cough.  · Wearing away of tooth enamel.  · Weight loss.    Different conditions can cause chest pain. Make sure to see your health care provider if you experience chest pain.  How is this diagnosed?  Your health care provider will take a medical history and perform a physical exam. To determine if you have mild or severe GERD, your health care provider may also monitor how you respond to treatment. You may also have other tests, including:  · An endoscopy to examine your stomach and esophagus with a small camera.  · A test that measures the acidity level in your esophagus.  · A test that measures how much pressure is on your esophagus.  · A barium swallow or modified barium swallow to show the shape, size, and functioning of your esophagus.    How is this treated?  The goal of treatment is to help relieve your symptoms and to prevent complications.  Treatment for this condition may vary depending on how severe your symptoms are. Your health care provider may recommend:  · Changes to your diet.  · Medicine.  · Surgery.    Follow these instructions at home:  Diet  · Follow a diet as recommended by your health care provider. This may involve avoiding foods and drinks such as:  ? Coffee and tea (with or without caffeine).  ? Drinks that contain alcohol.  ? Energy drinks and sports drinks.  ? Carbonated drinks or sodas.  ? Chocolate and cocoa.  ? Peppermint and mint flavorings.  ? Garlic and onions.  ? Horseradish.  ? Spicy and acidic foods, including peppers, chili powder, wolf powder, vinegar, hot sauces, and barbecue sauce.  ? Citrus fruit juices and citrus fruits, such as oranges, tyler, and limes.  ? Tomato-based foods, such as red sauce, chili, salsa, and pizza with red sauce.  ? Fried and fatty foods, such as donuts, french fries, potato chips, and high-fat dressings.  ? High-fat meats, such as hot dogs and fatty cuts of red and white meats, such as rib eye steak, sausage, ham, and rodriguez.  ? High-fat dairy items, such as whole milk, butter, and cream cheese.  · Eat small, frequent meals instead of large meals.  · Avoid drinking large amounts of liquid with your meals.  · Avoid eating meals during the 2-3 hours before bedtime.  · Avoid lying down right after you eat.  · Do not exercise right after you eat.  General instructions  · Pay attention to any changes in your symptoms.  · Take over-the-counter and prescription medicines only as told by your health care provider. Do not take aspirin, ibuprofen, or other NSAIDs unless your health care provider told you to do so.  · Do not use any tobacco products, including cigarettes, chewing tobacco, and e-cigarettes. If you need help quitting, ask your health care provider.  · Wear loose-fitting clothing. Do not wear anything tight around your waist that causes pressure on your abdomen.  · Raise (elevate) the head  of your bed 6 inches (15cm).  · Try to reduce your stress, such as with yoga or meditation. If you need help reducing stress, ask your health care provider.  · If you are overweight, reduce your weight to an amount that is healthy for you. Ask your health care provider for guidance about a safe weight loss goal.  · Keep all follow-up visits as told by your health care provider. This is important.  Contact a health care provider if:  · You have new symptoms.  · You have unexplained weight loss.  · You have difficulty swallowing, or it hurts to swallow.  · You have wheezing or a persistent cough.  · Your symptoms do not improve with treatment.  · You have a hoarse voice.  Get help right away if:  · You have pain in your arms, neck, jaw, teeth, or back.  · You feel sweaty, dizzy, or light-headed.  · You have chest pain or shortness of breath.  · You vomit and your vomit looks like blood or coffee grounds.  · You faint.  · Your stool is bloody or black.  · You cannot swallow, drink, or eat.  This information is not intended to replace advice given to you by your health care provider. Make sure you discuss any questions you have with your health care provider.  Document Released: 09/27/2006 Document Revised: 05/17/2017 Document Reviewed: 04/13/2016  [employer] Interactive Patient Education © 2018 [employer]      Answers for HPI/ROS submitted by the patient on 2/27/2019   Sore throat  Chronicity: chronic  Onset: more than 1 month ago  Progression since onset: waxing and waning  Pain worse on: right  Fever: no fever  Fever duration: less than 1 day  Pain - numeric: 3/10  hoarse voice: Yes  plugged ear sensation: No  swollen glands: Yes  strep: No  mono: No

## 2019-03-01 NOTE — PATIENT INSTRUCTIONS
Call for problems or worsening symptoms  Adjust meds    Gastroesophageal Reflux Disease, Adult  Normally, food travels down the esophagus and stays in the stomach to be digested. However, when a person has gastroesophageal reflux disease (GERD), food and stomach acid move back up into the esophagus. When this happens, the esophagus becomes sore and inflamed. Over time, GERD can create small holes (ulcers) in the lining of the esophagus.  What are the causes?  This condition is caused by a problem with the muscle between the esophagus and the stomach (lower esophageal sphincter, or LES). Normally, the LES muscle closes after food passes through the esophagus to the stomach. When the LES is weakened or abnormal, it does not close properly, and that allows food and stomach acid to go back up into the esophagus. The LES can be weakened by certain dietary substances, medicines, and medical conditions, including:  · Tobacco use.  · Pregnancy.  · Having a hiatal hernia.  · Heavy alcohol use.  · Certain foods and beverages, such as coffee, chocolate, onions, and peppermint.    What increases the risk?  This condition is more likely to develop in:  · People who have an increased body weight.  · People who have connective tissue disorders.  · People who use NSAID medicines.    What are the signs or symptoms?  Symptoms of this condition include:  · Heartburn.  · Difficult or painful swallowing.  · The feeling of having a lump in the throat.  · A bitter taste in the mouth.  · Bad breath.  · Having a large amount of saliva.  · Having an upset or bloated stomach.  · Belching.  · Chest pain.  · Shortness of breath or wheezing.  · Ongoing (chronic) cough or a night-time cough.  · Wearing away of tooth enamel.  · Weight loss.    Different conditions can cause chest pain. Make sure to see your health care provider if you experience chest pain.  How is this diagnosed?  Your health care provider will take a medical history and perform  a physical exam. To determine if you have mild or severe GERD, your health care provider may also monitor how you respond to treatment. You may also have other tests, including:  · An endoscopy to examine your stomach and esophagus with a small camera.  · A test that measures the acidity level in your esophagus.  · A test that measures how much pressure is on your esophagus.  · A barium swallow or modified barium swallow to show the shape, size, and functioning of your esophagus.    How is this treated?  The goal of treatment is to help relieve your symptoms and to prevent complications. Treatment for this condition may vary depending on how severe your symptoms are. Your health care provider may recommend:  · Changes to your diet.  · Medicine.  · Surgery.    Follow these instructions at home:  Diet  · Follow a diet as recommended by your health care provider. This may involve avoiding foods and drinks such as:  ? Coffee and tea (with or without caffeine).  ? Drinks that contain alcohol.  ? Energy drinks and sports drinks.  ? Carbonated drinks or sodas.  ? Chocolate and cocoa.  ? Peppermint and mint flavorings.  ? Garlic and onions.  ? Horseradish.  ? Spicy and acidic foods, including peppers, chili powder, wolf powder, vinegar, hot sauces, and barbecue sauce.  ? Citrus fruit juices and citrus fruits, such as oranges, tyler, and limes.  ? Tomato-based foods, such as red sauce, chili, salsa, and pizza with red sauce.  ? Fried and fatty foods, such as donuts, french fries, potato chips, and high-fat dressings.  ? High-fat meats, such as hot dogs and fatty cuts of red and white meats, such as rib eye steak, sausage, ham, and rodriguez.  ? High-fat dairy items, such as whole milk, butter, and cream cheese.  · Eat small, frequent meals instead of large meals.  · Avoid drinking large amounts of liquid with your meals.  · Avoid eating meals during the 2-3 hours before bedtime.  · Avoid lying down right after you eat.  · Do  not exercise right after you eat.  General instructions  · Pay attention to any changes in your symptoms.  · Take over-the-counter and prescription medicines only as told by your health care provider. Do not take aspirin, ibuprofen, or other NSAIDs unless your health care provider told you to do so.  · Do not use any tobacco products, including cigarettes, chewing tobacco, and e-cigarettes. If you need help quitting, ask your health care provider.  · Wear loose-fitting clothing. Do not wear anything tight around your waist that causes pressure on your abdomen.  · Raise (elevate) the head of your bed 6 inches (15cm).  · Try to reduce your stress, such as with yoga or meditation. If you need help reducing stress, ask your health care provider.  · If you are overweight, reduce your weight to an amount that is healthy for you. Ask your health care provider for guidance about a safe weight loss goal.  · Keep all follow-up visits as told by your health care provider. This is important.  Contact a health care provider if:  · You have new symptoms.  · You have unexplained weight loss.  · You have difficulty swallowing, or it hurts to swallow.  · You have wheezing or a persistent cough.  · Your symptoms do not improve with treatment.  · You have a hoarse voice.  Get help right away if:  · You have pain in your arms, neck, jaw, teeth, or back.  · You feel sweaty, dizzy, or light-headed.  · You have chest pain or shortness of breath.  · You vomit and your vomit looks like blood or coffee grounds.  · You faint.  · Your stool is bloody or black.  · You cannot swallow, drink, or eat.  This information is not intended to replace advice given to you by your health care provider. Make sure you discuss any questions you have with your health care provider.  Document Released: 09/27/2006 Document Revised: 05/17/2017 Document Reviewed: 04/13/2016  [employer] Interactive Patient Education © 2018 [employer]

## 2019-03-04 ENCOUNTER — OFFICE VISIT (OUTPATIENT)
Dept: GASTROENTEROLOGY | Facility: CLINIC | Age: 39
End: 2019-03-04

## 2019-03-04 ENCOUNTER — TRANSCRIBE ORDERS (OUTPATIENT)
Dept: ADMINISTRATIVE | Facility: HOSPITAL | Age: 39
End: 2019-03-04

## 2019-03-04 VITALS
WEIGHT: 222 LBS | TEMPERATURE: 97.4 F | HEIGHT: 62 IN | OXYGEN SATURATION: 98 % | SYSTOLIC BLOOD PRESSURE: 132 MMHG | HEART RATE: 70 BPM | DIASTOLIC BLOOD PRESSURE: 94 MMHG | BODY MASS INDEX: 40.85 KG/M2

## 2019-03-04 DIAGNOSIS — R10.13 EPIGASTRIC PAIN: ICD-10-CM

## 2019-03-04 DIAGNOSIS — R13.19 OTHER DYSPHAGIA: ICD-10-CM

## 2019-03-04 DIAGNOSIS — R12 HEARTBURN: Primary | ICD-10-CM

## 2019-03-04 DIAGNOSIS — M79.601 UPPER EXTREMITY PAIN, ANTERIOR, RIGHT: Primary | ICD-10-CM

## 2019-03-04 PROCEDURE — 99213 OFFICE O/P EST LOW 20 MIN: CPT | Performed by: NURSE PRACTITIONER

## 2019-03-04 NOTE — PROGRESS NOTES
Chief Complaint:   Chief Complaint   Patient presents with   • GI Problem     post endoscopy         Patient ID: Talita English is a 39 y.o. female     History of Present Illness: This is a very pleasant 39-year-old female who is here today to follow-up status post EGD for complaints of epigastric pain, heartburn and dysphasia.    The patient underwent an EGD on 19 for dysphasia, heartburn and suspected celiac disease.  The patient was found to have LA grade reflux esophagitis.  No endoscopic esophageal abnormality to explain patient's dysphagia however esophagus was dilated.  Biopsies were found to be normal.  The patient was instructed to take Zantac 150 mg p.o. twice daily.  The patient has had problems with taking PPIs in the past causing nausea and vomiting.  The patient tells me however she went to ENT and was started on Protonix because she still had redness in the back of her throat.  She states so far she has done well on the Protonix.    The patient denies any nausea, vomiting, epigastric pain, dysphagia, pyrosis or hematemesis.  The patient denies any fever or chills.  Denies any melena or hematochezia.  Denies any unintentional weight loss or loss of appetite.    Past Medical History:   Diagnosis Date   • Disease of thyroid gland     hypothyroid   • Fibromyalgia    • GERD (gastroesophageal reflux disease)    • Migraines        Past Surgical History:   Procedure Laterality Date   •  SECTION     • ENDOSCOPY N/A 2019    Normal 1st and 2nd portions of the duodenum-biopsied; Normal stomach; LA grade A esophagitis; No esophageal abnormality to explain patient's dysphagia-esophagus dilated          Current Outpatient Medications:   •  cyclobenzaprine (FLEXERIL) 10 MG tablet, Take 10 mg by mouth 3 (Three) Times a Day As Needed for Muscle Spasms., Disp: , Rfl:   •  Erenumab-aooe (AIMOVIG SC), Inject  under the skin into the appropriate area as directed Every 30 (Thirty) Days., Disp: , Rfl:    •  hydrOXYzine (ATARAX) 25 MG tablet, Take 25 mg by mouth Daily., Disp: , Rfl: 1  •  levETIRAcetam (KEPPRA) 500 MG tablet, Take 1 tablet in AM and 2 tablets in PM, Disp: 90 tablet, Rfl: 5  •  Levonorgest-Eth Estrad 91-Day (AMETHIA) 0.15-0.03 &0.01 MG tablet, Take 1 tablet by mouth Daily., Disp: , Rfl:   •  levothyroxine (SYNTHROID, LEVOTHROID) 25 MCG tablet, Take 25 mcg by mouth daily., Disp: , Rfl:   •  memantine (NAMENDA) 5 MG tablet, TAKE 1 TABLET BY MOUTH DAILY, Disp: 60 tablet, Rfl: 5  •  naproxen (NAPROSYN) 500 MG tablet, Take 1 tablet by mouth 2 (Two) Times a Day As Needed for Moderate Pain  or Headache., Disp: 30 tablet, Rfl: 5  •  pantoprazole (PROTONIX) 40 MG EC tablet, Take 40 mg by mouth 30 minute prior to breakfast, Disp: 30 tablet, Rfl: 3  •  pregabalin (LYRICA) 75 MG capsule, Take 75 mg by mouth 3 (Three) Times a Day., Disp: , Rfl:   •  prochlorperazine (COMPAZINE) 5 MG tablet, TAKE 1 TABLET BY MOUTH EVERY 8 HOURS AS NEEDED FOR NAUSEA OR VOMITING, Disp: 60 tablet, Rfl: 1  •  promethazine (PHENERGAN) 25 MG tablet, Take 25 mg by mouth Every 6 (Six) Hours As Needed for Nausea or Vomiting., Disp: , Rfl:   •  SUMAtriptan (IMITREX) 100 MG tablet, Take 1 tablet by mouth 1 (One) Time As Needed for Migraine (Do not exceed 2 in 24 hours.)., Disp: 9 tablet, Rfl: 5    Allergies   Allergen Reactions   • Codeine Nausea And Vomiting   • Maxalt [Rizatriptan Benzoate] Nausea Only   • Relpax [Eletriptan] Nausea Only   • Elavil [Amitriptyline Hcl] Rash       Social History     Socioeconomic History   • Marital status:      Spouse name: Not on file   • Number of children: Not on file   • Years of education: Not on file   • Highest education level: Not on file   Social Needs   • Financial resource strain: Not on file   • Food insecurity - worry: Not on file   • Food insecurity - inability: Not on file   • Transportation needs - medical: Not on file   • Transportation needs - non-medical: Not on file  "  Occupational History   • Not on file   Tobacco Use   • Smoking status: Never Smoker   • Smokeless tobacco: Never Used   Substance and Sexual Activity   • Alcohol use: No   • Drug use: No   • Sexual activity: Defer   Other Topics Concern   • Not on file   Social History Narrative   • Not on file       Family History   Problem Relation Age of Onset   • No Known Problems Mother    • No Known Problems Father    • Colon cancer Paternal Uncle    • Colon polyps Neg Hx        Vitals:    03/04/19 1015   BP: 132/94   Pulse: 70   Temp: 97.4 °F (36.3 °C)   SpO2: 98%   Weight: 101 kg (222 lb)   Height: 157.5 cm (62\")       Review of Systems:    General:    Present -feeling well   Skin:    Not Present-Rash   HEENT:     Not Present-Acute visual changes or Acute hearing changes   Neck :    Not Present- swollen glands   Genitourinary:      Not Present- burning, frequency, urgency hematuria, dysuria,   Cardiovascular:   Not Present-chest pain, palpitations, or pressure   Respiratory:   Not Present- shortness of breath or cough   Gastrointestinal:  Musculoskeletal:  Neurological:  Psychiatric:   Present as mentioned in the HP    Not Present. Recent gait disturbances.    Not Present-Seizures and weakness in extremities.    Not Present- Anxiety or Depression.       Physical Exam:    General Appearance:    Alert, cooperative, in no acute distress   Psych:    Mood appropriate    Eyes:          conjunctivae and sclerae normal, no   icterus, no pallor   ENMT:    Ears appear intact with no abnormalities noted oral mucosa moist   Neck:   No adenopathy, supple, trachea midline, no thyromegaly, no   carotid bruit, no JVD    Cardiovascular:    Regular rhythm and normal rate, normal S1 and S2, no            murmur, no gallop, no rub, no click   Gastrointestinal:     Inspection normal.  Normal bowel sounds, no masses, no organomegaly, soft round non-tender, non-distended, no guarding, no rebound or tenderness. No hepatosplenomegaly.   Skin:   No " bleeding, bruising or rash   Neurologic:   nonfocal       Lab Results   Component Value Date    WBC 9.53 11/30/2017    WBC 10.59 11/29/2017    WBC 23.18 (H) 04/14/2015    HGB 12.6 11/30/2017    HGB 13.0 11/29/2017    HGB 13.6 04/14/2015    HCT 37.5 11/30/2017    HCT 39.9 11/29/2017    HCT 41.2 04/14/2015     11/30/2017     11/29/2017     04/14/2015        Lab Results   Component Value Date     11/30/2017     11/29/2017     04/14/2015    K 4.0 11/30/2017    K 4.1 11/29/2017    K 4.6 04/14/2015     11/30/2017     11/29/2017     04/14/2015    CO2 25.0 11/30/2017    CO2 27.0 11/29/2017    CO2 23 (L) 04/14/2015    BUN 7 11/30/2017    BUN 7 11/29/2017    BUN 11 04/14/2015    CREATININE 0.95 11/30/2017    CREATININE 0.91 11/29/2017    CREATININE 0.90 11/03/2017    BILITOT 0.4 11/29/2017    BILITOT 0.6 04/12/2015    BILITOT 0.5 03/04/2014    ALKPHOS 86 11/29/2017    ALKPHOS 91 04/12/2015    ALKPHOS 58 03/04/2014    ALT 28 11/29/2017    ALT 28 04/12/2015    ALT 33 03/04/2014    AST 32 11/29/2017    AST 31 04/12/2015    AST 33 03/04/2014    GLUCOSE 85 11/30/2017    GLUCOSE 87 11/29/2017    GLUCOSE 151 (H) 04/14/2015       Lab Results   Component Value Date    INR 0.94 04/12/2015       Body mass index is 40.6 kg/m². Patient's Body mass index is 40.6 kg/m². BMI is above normal parameters. Recommendations include: nutrition counseling.    Assessment and Plan:  Assessment/Plan   Talita was seen today for gi problem.    Diagnoses and all orders for this visit:    Heartburn  Comments:  Patient states that the Zantac has helped greatly but recently started back on PPI per ENT and has not had N/V as before    Other dysphagia    Epigastric pain             There are no Patient Instructions on file for this visit.    Next follow-up appointment          EMR Dragon/Transcription disclaimer:  Much of this encounter note is an electronic transcription/translation of spoken language  to printed text. The electronic translation of spoken language may permit erroneous, or at times, nonsensical words or phrases to be inadvertently transcribed; although I have reviewed the note for such errors, some may still exist.

## 2019-03-08 ENCOUNTER — HOSPITAL ENCOUNTER (OUTPATIENT)
Dept: ULTRASOUND IMAGING | Facility: HOSPITAL | Age: 39
Discharge: HOME | End: 2019-03-08
Admitting: NURSE PRACTITIONER

## 2019-03-08 ENCOUNTER — OFFICE VISIT (OUTPATIENT)
Dept: UROLOGY | Facility: CLINIC | Age: 39
End: 2019-03-08

## 2019-03-08 VITALS
SYSTOLIC BLOOD PRESSURE: 134 MMHG | BODY MASS INDEX: 40.48 KG/M2 | DIASTOLIC BLOOD PRESSURE: 89 MMHG | WEIGHT: 220 LBS | HEIGHT: 62 IN | TEMPERATURE: 98 F

## 2019-03-08 DIAGNOSIS — R31.29 MICROSCOPIC HEMATURIA: Primary | ICD-10-CM

## 2019-03-08 DIAGNOSIS — N94.10 DYSPAREUNIA, FEMALE: ICD-10-CM

## 2019-03-08 DIAGNOSIS — R35.0 FREQUENCY OF MICTURITION: ICD-10-CM

## 2019-03-08 LAB
BILIRUB BLD-MCNC: NEGATIVE MG/DL
CLARITY, POC: CLEAR
COLOR UR: YELLOW
GLUCOSE UR STRIP-MCNC: NEGATIVE MG/DL
KETONES UR QL: NEGATIVE
LEUKOCYTE EST, POC: ABNORMAL
NITRITE UR-MCNC: NEGATIVE MG/ML
PH UR: 7 [PH] (ref 5–8)
PROT UR STRIP-MCNC: NEGATIVE MG/DL
RBC # UR STRIP: ABNORMAL /UL
SP GR UR: 1.01 (ref 1–1.03)
UROBILINOGEN UR QL: NORMAL

## 2019-03-08 PROCEDURE — 93971 EXTREMITY STUDY: CPT

## 2019-03-08 PROCEDURE — 88112 CYTOPATH CELL ENHANCE TECH: CPT | Performed by: UROLOGY

## 2019-03-08 PROCEDURE — 93971 EXTREMITY STUDY: CPT | Performed by: SURGERY

## 2019-03-08 PROCEDURE — 81001 URINALYSIS AUTO W/SCOPE: CPT | Performed by: UROLOGY

## 2019-03-08 PROCEDURE — 99204 OFFICE O/P NEW MOD 45 MIN: CPT | Performed by: UROLOGY

## 2019-03-08 NOTE — PATIENT INSTRUCTIONS

## 2019-03-13 ENCOUNTER — HOSPITAL ENCOUNTER (OUTPATIENT)
Dept: NEUROLOGY | Facility: HOSPITAL | Age: 39
Discharge: HOME | End: 2019-03-13
Admitting: CLINICAL NURSE SPECIALIST

## 2019-03-13 DIAGNOSIS — R13.10 DYSPHAGIA, UNSPECIFIED TYPE: ICD-10-CM

## 2019-03-13 DIAGNOSIS — R25.1 TREMOR: ICD-10-CM

## 2019-03-13 DIAGNOSIS — R53.83 FATIGUE, UNSPECIFIED TYPE: ICD-10-CM

## 2019-03-13 LAB
CYTO UR: NORMAL
LAB AP CASE REPORT: NORMAL
PATH REPORT.FINAL DX SPEC: NORMAL
PATH REPORT.GROSS SPEC: NORMAL

## 2019-03-13 PROCEDURE — 95886 MUSC TEST DONE W/N TEST COMP: CPT | Performed by: PSYCHIATRY & NEUROLOGY

## 2019-03-13 PROCEDURE — 95911 NRV CNDJ TEST 9-10 STUDIES: CPT

## 2019-03-13 PROCEDURE — 95911 NRV CNDJ TEST 9-10 STUDIES: CPT | Performed by: PSYCHIATRY & NEUROLOGY

## 2019-03-13 PROCEDURE — 95886 MUSC TEST DONE W/N TEST COMP: CPT

## 2019-03-13 RX ORDER — MEMANTINE HYDROCHLORIDE 5 MG/1
5 TABLET ORAL 2 TIMES DAILY
Qty: 60 TABLET | Refills: 5 | Status: SHIPPED | OUTPATIENT
Start: 2019-03-13 | End: 2019-09-20 | Stop reason: SDUPTHER

## 2019-03-22 ENCOUNTER — HOSPITAL ENCOUNTER (OUTPATIENT)
Dept: CT IMAGING | Facility: HOSPITAL | Age: 39
Discharge: HOME | End: 2019-03-22
Admitting: UROLOGY

## 2019-03-22 ENCOUNTER — HOSPITAL ENCOUNTER (OUTPATIENT)
Dept: CT IMAGING | Facility: HOSPITAL | Age: 39
Discharge: HOME | End: 2019-03-22

## 2019-03-22 DIAGNOSIS — R22.1 NECK SWELLING: ICD-10-CM

## 2019-03-22 DIAGNOSIS — R07.0 THROAT PAIN: ICD-10-CM

## 2019-03-22 DIAGNOSIS — M54.2 NECK PAIN: ICD-10-CM

## 2019-03-22 DIAGNOSIS — R31.29 MICROSCOPIC HEMATURIA: ICD-10-CM

## 2019-03-22 LAB — CREAT BLDA-MCNC: 0.8 MG/DL (ref 0.6–1.3)

## 2019-03-22 PROCEDURE — 74178 CT ABD&PLV WO CNTR FLWD CNTR: CPT

## 2019-03-22 PROCEDURE — 25010000002 IOPAMIDOL 61 % SOLUTION: Performed by: UROLOGY

## 2019-03-22 PROCEDURE — 82565 ASSAY OF CREATININE: CPT

## 2019-03-22 PROCEDURE — 70491 CT SOFT TISSUE NECK W/DYE: CPT

## 2019-03-22 RX ADMIN — IOPAMIDOL 100 ML: 612 INJECTION, SOLUTION INTRAVENOUS at 10:07

## 2019-03-29 ENCOUNTER — PROCEDURE VISIT (OUTPATIENT)
Dept: UROLOGY | Facility: CLINIC | Age: 39
End: 2019-03-29

## 2019-03-29 DIAGNOSIS — R31.29 MICROSCOPIC HEMATURIA: Primary | ICD-10-CM

## 2019-03-29 LAB
BILIRUB BLD-MCNC: NEGATIVE MG/DL
CLARITY, POC: CLEAR
COLOR UR: YELLOW
GLUCOSE UR STRIP-MCNC: NEGATIVE MG/DL
KETONES UR QL: NEGATIVE
LEUKOCYTE EST, POC: NEGATIVE
NITRITE UR-MCNC: NEGATIVE MG/ML
PH UR: 7 [PH] (ref 5–8)
PROT UR STRIP-MCNC: NEGATIVE MG/DL
RBC # UR STRIP: ABNORMAL /UL
SP GR UR: 1 (ref 1–1.03)
UROBILINOGEN UR QL: NORMAL

## 2019-03-29 PROCEDURE — 81003 URINALYSIS AUTO W/O SCOPE: CPT | Performed by: UROLOGY

## 2019-03-29 PROCEDURE — 52000 CYSTOURETHROSCOPY: CPT | Performed by: UROLOGY

## 2019-03-29 NOTE — PROGRESS NOTES
Pre- operative diagnosis:  Hematuria    Post operative diagnosis:  Same    Procedure:  The patient was prepped and draped in a normal sterile fashion.  The urethra was anesthetized with 2% lidocaine jelly.  A rigid cystoscope was introduced per urethra.      Urethra:  Normal    Bladder:  There is no evidence of a stone, foreign body or mass within the bladder.  The bladder is minimally trabeculated.  The bladder neck is without contracture.    Ureteral orifices:  Normal position bilaterally and Clear efflux bilaterally    Patient tolerated the procedure well    Complications: none    Blood loss: minimal    Follow up:    Routine follow up    Diet instructions given for IC.  Stop Atarax follow-up with nurse practitioner in 6 weeks for symptom check.

## 2019-04-26 RX ORDER — LEVETIRACETAM 500 MG/1
TABLET ORAL
Qty: 90 TABLET | Refills: 5 | Status: SHIPPED | OUTPATIENT
Start: 2019-04-26 | End: 2019-09-20 | Stop reason: SDUPTHER

## 2019-05-10 ENCOUNTER — TELEPHONE (OUTPATIENT)
Dept: NEUROLOGY | Facility: CLINIC | Age: 39
End: 2019-05-10

## 2019-05-10 NOTE — TELEPHONE ENCOUNTER
Apryl said she wasn't able to get Aimovig.  She has one pen at home, she is currently taking 140mg.  She is going to try to come to the office to get a sample of Aimovig.  Apryl notified that her insurance approved 70mg and we are trying to get a PA for 140mg.  She is going to try to come to the office to get a sample of Aimovig so she will have 140mg for this month.

## 2019-05-17 ENCOUNTER — OFFICE VISIT (OUTPATIENT)
Dept: NEUROLOGY | Facility: CLINIC | Age: 39
End: 2019-05-17

## 2019-05-17 ENCOUNTER — OFFICE VISIT (OUTPATIENT)
Dept: UROLOGY | Facility: CLINIC | Age: 39
End: 2019-05-17

## 2019-05-17 VITALS
BODY MASS INDEX: 40.12 KG/M2 | DIASTOLIC BLOOD PRESSURE: 110 MMHG | WEIGHT: 218 LBS | SYSTOLIC BLOOD PRESSURE: 140 MMHG | HEIGHT: 62 IN | RESPIRATION RATE: 18 BRPM | OXYGEN SATURATION: 100 % | HEART RATE: 85 BPM

## 2019-05-17 VITALS — WEIGHT: 220 LBS | HEIGHT: 62 IN | TEMPERATURE: 97.5 F | BODY MASS INDEX: 40.48 KG/M2

## 2019-05-17 DIAGNOSIS — N30.20 CYSTITIS, CHRONIC: ICD-10-CM

## 2019-05-17 DIAGNOSIS — R10.2 PELVIC PAIN: ICD-10-CM

## 2019-05-17 DIAGNOSIS — N94.10 DYSPAREUNIA, FEMALE: ICD-10-CM

## 2019-05-17 DIAGNOSIS — R31.29 MICROSCOPIC HEMATURIA: Primary | ICD-10-CM

## 2019-05-17 DIAGNOSIS — G43.719 INTRACTABLE CHRONIC MIGRAINE WITHOUT AURA AND WITHOUT STATUS MIGRAINOSUS: Primary | ICD-10-CM

## 2019-05-17 LAB
BILIRUB BLD-MCNC: NEGATIVE MG/DL
CLARITY, POC: CLEAR
COLOR UR: YELLOW
GLUCOSE UR STRIP-MCNC: NEGATIVE MG/DL
KETONES UR QL: NEGATIVE
LEUKOCYTE EST, POC: NEGATIVE
NITRITE UR-MCNC: NEGATIVE MG/ML
PH UR: 6.5 [PH] (ref 5–8)
PROT UR STRIP-MCNC: NEGATIVE MG/DL
RBC # UR STRIP: ABNORMAL /UL
SP GR UR: 1 (ref 1–1.03)
UROBILINOGEN UR QL: NORMAL

## 2019-05-17 PROCEDURE — 51798 US URINE CAPACITY MEASURE: CPT | Performed by: NURSE PRACTITIONER

## 2019-05-17 PROCEDURE — 99214 OFFICE O/P EST MOD 30 MIN: CPT | Performed by: NURSE PRACTITIONER

## 2019-05-17 PROCEDURE — 99213 OFFICE O/P EST LOW 20 MIN: CPT | Performed by: CLINICAL NURSE SPECIALIST

## 2019-05-17 PROCEDURE — 81001 URINALYSIS AUTO W/SCOPE: CPT | Performed by: NURSE PRACTITIONER

## 2019-05-17 RX ORDER — CETIRIZINE HYDROCHLORIDE 10 MG/1
10 TABLET ORAL DAILY
Qty: 30 TABLET | Refills: 2 | Status: SHIPPED | OUTPATIENT
Start: 2019-05-17 | End: 2019-08-18 | Stop reason: SDUPTHER

## 2019-05-17 RX ORDER — FERROUS SULFATE 325(65) MG
1 TABLET ORAL 2 TIMES DAILY
Refills: 2 | COMMUNITY
Start: 2019-04-26 | End: 2019-09-10

## 2019-05-17 RX ORDER — PROCHLORPERAZINE MALEATE 10 MG
10 TABLET ORAL EVERY 6 HOURS PRN
Qty: 20 TABLET | Refills: 1 | Status: SHIPPED | OUTPATIENT
Start: 2019-05-17 | End: 2019-05-21 | Stop reason: SDUPTHER

## 2019-05-17 NOTE — PROGRESS NOTES
Subjective     Chief Complaint   Patient presents with   • Migraine     Patient states that she had 4 migraines last month and 2 this month.    • Fatigue     Still a chronic issue.       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 She is here today for follow up for migraines. She was last seen 2/8/19. She is by herself today. She has  Continued with Keppra 500 mg AM and 1000 mg PM, namenda 5 mg BID and Aimovig 140 mg monthly. She was not able to get Aimovig April 2019 and there was delay in receiving her injection and did notice increase in frequency of HA. She tells me  April took 5 doses of IMITREX and compaizne, and so far for May 2 doses. Also takes naprosyn. Overall, patient reports improvement and decrease in numbner of monthly HA. She denies injection site reaction to Aimovig. Patient did have EMG/NCV with results below for complaints of tremor.     At last visit was referred to ENT for voice change and fullness in her neck. Did have CT soft tissue neck. Was referred to urology for microscopic hematuria and so far work up unremarkable. She did have CT abdomen that showed diverticulosis. Also referred to GI for complaints of dysphagia.     4/11/19 patient did go to ED in [place] as she was having extremely elevated BP with  lightheadedness, back pain and diaphoresis. Work up was negative. She does have family hx of MI in 40s in her father and sister. She continues to monitor her BP and tells me has been elevated. She has upcoming appointment with PCP in a few weeks.       Migraine    This is a chronic problem. The current episode started more than 1 year ago (while in college). The problem occurs daily. Progression since onset: significant improvement with indocin and has had one severe headache lastin 5 days with nausea/vomiting. The pain is located in the occipital and bilateral (as well as right side of head) region. The quality of the pain is described as stabbing and  sharp. The pain is at a severity of 8/10. The pain is severe. Associated symptoms include nausea, numbness (occasional tingling in feet at times), phonophobia, photophobia and vomiting. Pertinent negatives include no dizziness, fever, neck pain, rhinorrhea, sore throat or weakness. The symptoms are aggravated by weather changes and menstrual cycle. Treatments tried: topamax, vimpat, maxalt, relpax, elavil, fioricet, DHE, Keppra, Indocin, botox, zomig, DHE, compazine. The treatment provided moderate relief.        Current Outpatient Medications   Medication Sig Dispense Refill   • cetirizine (zyrTEC) 10 MG tablet Take 1 tablet by mouth Daily. 30 tablet 2   • cyclobenzaprine (FLEXERIL) 10 MG tablet Take 10 mg by mouth 3 (Three) Times a Day As Needed for Muscle Spasms.     • Erenumab-aooe 140 MG/ML solution auto-injector Inject 1 mL under the skin into the appropriate area as directed Every 30 (Thirty) Days. 1 pen 5   • levETIRAcetam (KEPPRA) 500 MG tablet TAKE 1 TABLET BY MOUTH EVERY MORNING AND 2 TABLETS EVERY EVENING 90 tablet 5   • Levonorgest-Eth Estrad 91-Day (AMETHIA) 0.15-0.03 &0.01 MG tablet Take 1 tablet by mouth Daily.     • levothyroxine (SYNTHROID, LEVOTHROID) 25 MCG tablet Take 25 mcg by mouth daily.     • memantine (NAMENDA) 5 MG tablet Take 1 tablet by mouth 2 (Two) Times a Day. 60 tablet 5   • naproxen (NAPROSYN) 500 MG tablet Take 1 tablet by mouth 2 (Two) Times a Day As Needed for Moderate Pain  or Headache. 30 tablet 5   • pantoprazole (PROTONIX) 40 MG EC tablet Take 40 mg by mouth 30 minute prior to breakfast 30 tablet 3   • Erenumab-aooe (AIMOVIG) 70 MG/ML prefilled syringe Inject 2 mL under the skin into the appropriate area as directed 1 (One) Time for 1 dose. 2 mL 0   • FEROSUL 325 (65 Fe) MG tablet Take 1 tablet by mouth 2 (Two) Times a Day.  2   • prochlorperazine (COMPAZINE) 10 MG tablet Take 1 tablet by mouth Every 6 (Six) Hours As Needed for Nausea or Vomiting. 20 tablet 1     No current  "facility-administered medications for this visit.        Past Medical History:   Diagnosis Date   • Disease of thyroid gland     hypothyroid   • Fibromyalgia    • GERD (gastroesophageal reflux disease)    • Migraines        Past Surgical History:   Procedure Laterality Date   •  SECTION     • ENDOSCOPY N/A 2019    Normal 1st and 2nd portions of the duodenum-biopsied; Normal stomach; LA grade A esophagitis; No esophageal abnormality to explain patient's dysphagia-esophagus dilated        family history includes Cancer in her father; Colon cancer in her paternal uncle; Migraines in her brother; No Known Problems in her mother.    Social History     Tobacco Use   • Smoking status: Never Smoker   • Smokeless tobacco: Never Used   Substance Use Topics   • Alcohol use: No   • Drug use: No       Review of Systems   Constitutional: Positive for fatigue. Negative for fever.   HENT: Negative for postnasal drip, rhinorrhea and sore throat.    Eyes: Positive for photophobia.   Respiratory: Negative.  Negative for apnea, choking and shortness of breath.    Cardiovascular: Negative.  Negative for chest pain.   Gastrointestinal: Positive for constipation, nausea and vomiting. Negative for diarrhea.        Recently having sensation as though \"food stuck\"   Endocrine: Negative.  Negative for cold intolerance and heat intolerance.   Genitourinary: Negative.  Negative for dysuria and frequency.   Musculoskeletal: Positive for arthralgias. Negative for gait problem, joint swelling, myalgias and neck pain.   Skin: Negative.    Allergic/Immunologic: Negative.    Neurological: Positive for numbness (occasional tingling in feet at times) and headaches. Negative for dizziness, tremors, speech difficulty and weakness.   Hematological: Negative.  Negative for adenopathy.   Psychiatric/Behavioral: Negative.  Negative for agitation, confusion and hallucinations.   All other systems reviewed and are negative.      Objective     BP " "(!) 140/110 (BP Location: Left arm, Patient Position: Sitting, Cuff Size: Adult)   Pulse 85   Resp 18   Ht 157.5 cm (62\")   Wt 98.9 kg (218 lb)   LMP 10/17/2018 (Approximate)   SpO2 100%   Breastfeeding? No   BMI 39.87 kg/m² , Body mass index is 39.87 kg/m².    Physical Exam   Constitutional: She is oriented to person, place, and time. Vital signs are normal. She appears well-developed and well-nourished. She is cooperative.   HENT:   Head: Normocephalic and atraumatic.   Right Ear: Hearing and external ear normal.   Left Ear: Hearing and external ear normal.   Nose: Nose normal.   Mouth/Throat: Uvula is midline, oropharynx is clear and moist and mucous membranes are normal.   Eyes: Conjunctivae, EOM and lids are normal. Pupils are equal, round, and reactive to light. Right eye exhibits normal extraocular motion and no nystagmus. Left eye exhibits normal extraocular motion and no nystagmus. Right pupil is round and reactive. Left pupil is round and reactive. Pupils are equal.   Neck: Trachea normal and normal range of motion. Neck supple. No muscular tenderness present. Carotid bruit is not present. No thyromegaly present.   Cardiovascular: Normal rate, regular rhythm and normal heart sounds.   No murmur heard.  Pulmonary/Chest: Effort normal and breath sounds normal. She has no decreased breath sounds. She has no rhonchi.   Abdominal: Soft. Bowel sounds are normal.   Musculoskeletal: Normal range of motion.   Neurological: She is alert and oriented to person, place, and time. She has normal strength and normal reflexes. She displays no atrophy and no tremor. No cranial nerve deficit or sensory deficit. She exhibits normal muscle tone. She displays a negative Romberg sign. Coordination and gait normal.   Reflex Scores:       Tricep reflexes are 2+ on the right side and 2+ on the left side.       Bicep reflexes are 2+ on the right side and 2+ on the left side.       Brachioradialis reflexes are 2+ on the " right side and 2+ on the left side.       Patellar reflexes are 2+ on the right side and 2+ on the left side.       Achilles reflexes are 2+ on the right side and 2+ on the left side.  CN II:  Visual fields full.  Pupils equally reactive to light  CN III, IV, VI:  Extraocular Muscles full with no signs of nystagmus  CN V:  Facial sensory is symmetric with no asymetries.  CN VII:  Facial motor symmetric  CN VIII:  Gross hearing intact bilaterally  CN IX:  Palate elevates symmetrically  CN X:  Palate elevates symmetrically  CN XI:  Shoulder shrug symmetric  CN XII:  Tongue is midline on protrusion    Full and symmetric strength bilateral upper and lower extremities   Skin: Skin is warm and dry.   Psychiatric: She has a normal mood and affect. Her speech is normal and behavior is normal. Cognition and memory are normal.   Nursing note and vitals reviewed.      Results for orders placed or performed in visit on 05/17/19   POC Urinalysis Dipstick   Result Value Ref Range    Color Yellow Yellow, Straw, Dark Yellow, Patricia    Clarity, UA Clear Clear    Glucose, UA Negative Negative, 1000 mg/dL (3+) mg/dL    Bilirubin Negative Negative    Ketones, UA Negative Negative    Specific Gravity  1.005 1.005 - 1.030    Blood, UA Small (A) Negative    pH, Urine 6.5 5.0 - 8.0    Protein, POC Negative Negative mg/dL    Urobilinogen, UA Normal Normal    Leukocytes Negative Negative    Nitrite, UA Negative Negative      NCV:   Impression     The study of the right upper extremity shows no significant   abnormalities except for slight prolongation ulnar versus median F   response of uncertain etiology and significance.  I cannot rule out   proximal nerve/nerve root or plexus abnormality or thoracic outlet   syndrome is contributory.  This must be correlated with patient's   presentation.  In the right lower extremity no abnormalities are appreciated  except for   the difference  F responses which may be artifactual       EMG:  EMG  REPORT    RESULTS: EMG is done of right upper extremity right cervical paraspinals   and right lower extremity right lumbar paraspinals.  There is no increased   insertional irritability and normal motor units throughout.  No   abnormalities of interference patterns seen.  Some tremor is noted.    IMPRESSION: Normal EMG of right upper and right lower extremity and   cervical and lumbar paraspinals with some tremor noted    ASSESSMENT/PLAN    Diagnoses and all orders for this visit:    Intractable chronic migraine without aura and without status migrainosus    Other orders  -     FEROSUL 325 (65 Fe) MG tablet; Take 1 tablet by mouth 2 (Two) Times a Day.  -     Erenumab-aooe (AIMOVIG) 70 MG/ML prefilled syringe; Inject 2 mL under the skin into the appropriate area as directed 1 (One) Time for 1 dose.  -     prochlorperazine (COMPAZINE) 10 MG tablet; Take 1 tablet by mouth Every 6 (Six) Hours As Needed for Nausea or Vomiting.      MEDICAL DECISION MAKIN. Continue with Keppra 500 mg in AM and 1000 mg PM for migraine prophylaxis.  2. Continue  Namenda 5 mg BID and counseled on side effects  3. Discontinue Imitrex and patient has had elevated BP and would like her to have cardiac work up before considering resuming but may need to consider alternative migraine treatment and may also need to discontinue triptans all together.    4. Will continue Compazine and naprysyn PRN for migraine treatment.  5. continueAimovig to 140 mg . Counseled in the studies reported improvement after 4-6 months of 50% reduction in number of HA/migraines. Patient reports having difficulty getting this from her pharmacy and seems to also possible financial ricardo, will transition to Emgality.       7. Patient had previously responded to DHE for migraine treatment but in light of recent hypertensive events and possible cardiac work up this would have to be considered carefully and may no longer be an option for treatment.   8. Refer to urology  for hematuria and difficulty voiding  9. EMG/NCV reviewed.  myasthenia panel negative. Iron profile showed anemia and patient now taking iron supplement.     HPI and ROS reviewed and updated.      allergies and all known medications/prescriptions have been reviewed using resources available on this encounter.    Return in about 4 months (around 9/17/2019).        HOLLY Loja

## 2019-05-17 NOTE — PATIENT INSTRUCTIONS

## 2019-05-17 NOTE — PROGRESS NOTES
Ms. English is 39 y.o. female    Chief Complaint   Patient presents with   • Urinary Incontinence   • Pelvic Pain       Blood in Urine   This is a new problem. The current episode started more than 1 month ago. The problem is unchanged. She describes the hematuria as microscopic hematuria. She reports no clotting in her urine stream. Irritative symptoms include frequency (leaking some throughout week. Not daily ) and urgency. Obstructive symptoms include incomplete emptying. Associated symptoms include dysuria (comes and goes, achy pressure) and genital pain. Pertinent negatives include no abdominal pain, chills, fever, flank pain, nausea or vomiting. There is no history of kidney stones or STDs.      Patient also with complaints of pelvic pain, pressure with urination, feeling of incomplete emptying, low back/flank pain.  She states all of these are intermittent and mild to moderate in nature.  Associated urinary symptoms include urgency, incontinence after urination.  She has been on Atarax in the past for treatment of dyspareunia and possible interstitial cystitis.  She did not notice a difference on this medication.  At her last visit Dr. Navarro recommended dietary changes.  She states she was very stringent about these however did not notice any significant change in her symptoms.  She states her most bothersome symptom is leaking after urination and dyspareunia.  She states the symptoms are also the most consistent.  She notices the symptoms are worse when she is not fully hydrated or not able to empty her bladder.  Symptoms also tend to be worse at night.  She does perform [employer] regularly.  Patient also notes most of her symptoms began after the birth of her daughter 6 years ago by emergent .    The following portions of the patient's history were reviewed and updated as appropriate: allergies, current medications, past family history, past medical history, past social history, past surgical history  and problem list.    Review of Systems   Constitutional: Negative for chills and fever.   Gastrointestinal: Negative for abdominal distention, abdominal pain, anal bleeding, blood in stool, nausea and vomiting.   Genitourinary: Positive for difficulty urinating (feeling on incomplete emptying. Has to reposition or psuh to get it all out. ), dyspareunia, dysuria (comes and goes, achy pressure), frequency (leaking some throughout week. Not daily ), incomplete emptying, pelvic pain and urgency. Negative for decreased urine volume, enuresis, flank pain, genital sores, hematuria, menstrual problem, vaginal bleeding, vaginal discharge and vaginal pain.       Current Outpatient Medications:   •  cyclobenzaprine (FLEXERIL) 10 MG tablet, Take 10 mg by mouth 3 (Three) Times a Day As Needed for Muscle Spasms., Disp: , Rfl:   •  Erenumab-aooe 140 MG/ML solution auto-injector, Inject 1 mL under the skin into the appropriate area as directed Every 30 (Thirty) Days., Disp: 1 pen, Rfl: 5  •  levETIRAcetam (KEPPRA) 500 MG tablet, TAKE 1 TABLET BY MOUTH EVERY MORNING AND 2 TABLETS EVERY EVENING, Disp: 90 tablet, Rfl: 5  •  Levonorgest-Eth Estrad 91-Day (AMETHIA) 0.15-0.03 &0.01 MG tablet, Take 1 tablet by mouth Daily., Disp: , Rfl:   •  levothyroxine (SYNTHROID, LEVOTHROID) 25 MCG tablet, Take 25 mcg by mouth daily., Disp: , Rfl:   •  memantine (NAMENDA) 5 MG tablet, Take 1 tablet by mouth 2 (Two) Times a Day., Disp: 60 tablet, Rfl: 5  •  naproxen (NAPROSYN) 500 MG tablet, Take 1 tablet by mouth 2 (Two) Times a Day As Needed for Moderate Pain  or Headache., Disp: 30 tablet, Rfl: 5  •  pantoprazole (PROTONIX) 40 MG EC tablet, Take 40 mg by mouth 30 minute prior to breakfast, Disp: 30 tablet, Rfl: 3  •  prochlorperazine (COMPAZINE) 5 MG tablet, TAKE 1 TABLET BY MOUTH EVERY 8 HOURS AS NEEDED FOR NAUSEA OR VOMITING, Disp: 60 tablet, Rfl: 1  •  SUMAtriptan (IMITREX) 100 MG tablet, Take 1 tablet by mouth 1 (One) Time As Needed for Migraine  "(Do not exceed 2 in 24 hours.)., Disp: 9 tablet, Rfl: 5  •  cetirizine (zyrTEC) 10 MG tablet, Take 1 tablet by mouth Daily., Disp: 30 tablet, Rfl: 2  •  FEROSUL 325 (65 Fe) MG tablet, Take 1 tablet by mouth 2 (Two) Times a Day., Disp: , Rfl: 2    Past Medical History:   Diagnosis Date   • Disease of thyroid gland     hypothyroid   • Fibromyalgia    • GERD (gastroesophageal reflux disease)    • Migraines        Past Surgical History:   Procedure Laterality Date   •  SECTION     • ENDOSCOPY N/A 2019    Normal 1st and 2nd portions of the duodenum-biopsied; Normal stomach; LA grade A esophagitis; No esophageal abnormality to explain patient's dysphagia-esophagus dilated        Social History     Socioeconomic History   • Marital status:      Spouse name: Not on file   • Number of children: Not on file   • Years of education: Not on file   • Highest education level: Not on file   Tobacco Use   • Smoking status: Never Smoker   • Smokeless tobacco: Never Used   Substance and Sexual Activity   • Alcohol use: No   • Drug use: No   • Sexual activity: Defer       Family History   Problem Relation Age of Onset   • No Known Problems Mother    • Cancer Father    • Colon cancer Paternal Uncle    • Colon polyps Neg Hx        Objective    Temp 97.5 °F (36.4 °C)   Ht 157.5 cm (62\")   Wt 99.8 kg (220 lb)   BMI 40.24 kg/m²     Physical Exam   Constitutional: She is oriented to person, place, and time. She appears well-developed.   HENT:   Head: Normocephalic.   Pulmonary/Chest: Effort normal. No respiratory distress.   Abdominal: Soft. She exhibits no distension.   Musculoskeletal: She exhibits no edema.   Neurological: She is alert and oriented to person, place, and time.   Skin: Skin is warm and dry.   Psychiatric: She has a normal mood and affect. Her behavior is normal.   Vitals reviewed.    Patient's Body mass index is 40.24 kg/m². BMI is above normal parameters. Recommendations include: educational " material.      Procedure visit on 03/29/2019   Component Date Value Ref Range Status   • Color 03/29/2019 Yellow  Yellow, Straw, Dark Yellow, Patricia Final   • Clarity, UA 03/29/2019 Clear  Clear Final   • Glucose, UA 03/29/2019 Negative  Negative, 1000 mg/dL (3+) mg/dL Final   • Bilirubin 03/29/2019 Negative  Negative Final   • Ketones, UA 03/29/2019 Negative  Negative Final   • Specific Gravity  03/29/2019 1.005  1.005 - 1.030 Final   • Blood, UA 03/29/2019 Trace* Negative Final   • pH, Urine 03/29/2019 7.0  5.0 - 8.0 Final   • Protein, POC 03/29/2019 Negative  Negative mg/dL Final   • Urobilinogen, UA 03/29/2019 Normal  Normal Final   • Nitrite, UA 03/29/2019 Negative  Negative Final   • Leukocytes 03/29/2019 Negative  Negative Final       Results for orders placed or performed in visit on 05/17/19   POC Urinalysis Dipstick   Result Value Ref Range    Color Yellow Yellow, Straw, Dark Yellow, Patricia    Clarity, UA Clear Clear    Glucose, UA Negative Negative, 1000 mg/dL (3+) mg/dL    Bilirubin Negative Negative    Ketones, UA Negative Negative    Specific Gravity  1.005 1.005 - 1.030    Blood, UA Small (A) Negative    pH, Urine 6.5 5.0 - 8.0    Protein, POC Negative Negative mg/dL    Urobilinogen, UA Normal Normal    Leukocytes Negative Negative    Nitrite, UA Negative Negative        Assessment/Plan   Assessment and Plan    Estimation of residual urine via abdominal ultrasound  Residual Urine: 240 ml then rechecked after voiding and it was 0ml  Indication: feeling of incomplete emptying  Position: Supine  Examination: Incremental scanning of the suprapubic area using 3 MHz transducer using copious amounts of acoustic gel.   Findings: An anechoic area was demonstrated which represented the bladder, with measurement of residual urine as noted. IViolet, inspected this myself. In that the residual urine was stable or insignificant, no treatment will be necessary at this time.       Talita was seen today for urinary  "incontinence and pelvic pain.    Diagnoses and all orders for this visit:    Microscopic hematuria  -     POC Urinalysis Dipstick    Cystitis, chronic  -     cetirizine (zyrTEC) 10 MG tablet; Take 1 tablet by mouth Daily.    Dyspareunia, female    Pelvic pain        Patient presents with complaints of pelvic pain and the most bothersome urinary symptom being slight incontinence after urinating.  She does have a myriad of vague symptoms that I feel may be related to a broader systemic disease rather than a disease of the urinary tract. Patient agrees as she states several \"weird symptoms\" have been waxing and waning over the last few years.      Due to the chronic nature of her symptoms and pain in the pelvic region, we have been treating her for likely interstitial cystitis.  Atarax and dietary changes have not been helpful.  I have recommended a trial of daily Zyrtec and she will follow-up with me in 3 months to assess further symptoms.       "

## 2019-05-22 RX ORDER — PROCHLORPERAZINE MALEATE 10 MG
TABLET ORAL
Qty: 60 TABLET | Refills: 0 | Status: SHIPPED | OUTPATIENT
Start: 2019-05-22 | End: 2020-06-12 | Stop reason: SDUPTHER

## 2019-08-18 DIAGNOSIS — N30.20 CYSTITIS, CHRONIC: ICD-10-CM

## 2019-08-19 RX ORDER — CETIRIZINE HYDROCHLORIDE 10 MG/1
10 TABLET ORAL DAILY
Qty: 30 TABLET | Refills: 0 | Status: SHIPPED | OUTPATIENT
Start: 2019-08-19 | End: 2019-09-10 | Stop reason: SDUPTHER

## 2019-08-20 NOTE — PROGRESS NOTES
Ms. English is 39 y.o. female    Chief Complaint   Patient presents with   • Blood in Urine   • Urinary Frequency   • Urinary Urgency       History of Present Illness  Patient presents for follow-up.  She continues with complaints of pelvic pain, pressure with urination, dysuria, feeling of incomplete emptying, back and flank pain, dyspareunia, frequency, urgency, episodes of incontinence.  Patient has tried dietary changes, Atarax.  She does report some relief of symptoms with dietary changes.  At her last appointment we placed her on Zyrtec and she has noticed a moderate improvement in her pelvic pain.    The following portions of the patient's history were reviewed and updated as appropriate: allergies, current medications, past family history, past medical history, past social history, past surgical history and problem list.    Review of Systems   Constitutional: Negative for chills and fever.   Gastrointestinal: Negative for abdominal distention, abdominal pain, anal bleeding, blood in stool, nausea and vomiting.   Genitourinary: Positive for frequency, urgency (up at night x2-3 times. ) and vaginal pain (with intercourse). Negative for decreased urine volume, difficulty urinating, dyspareunia, dysuria (not as bad now), enuresis, flank pain, genital sores, hematuria, menstrual problem, pelvic pain, vaginal bleeding and vaginal discharge.         Current Outpatient Medications:   •  cetirizine (zyrTEC) 10 MG tablet, TAKE 1 TABLET BY MOUTH DAILY, Disp: 30 tablet, Rfl: 0  •  cyclobenzaprine (FLEXERIL) 10 MG tablet, Take 10 mg by mouth 3 (Three) Times a Day As Needed for Muscle Spasms., Disp: , Rfl:   •  Erenumab-aooe 140 MG/ML solution auto-injector, Inject 1 mL under the skin into the appropriate area as directed Every 30 (Thirty) Days., Disp: 1 pen, Rfl: 5  •  FEROSUL 325 (65 Fe) MG tablet, Take 1 tablet by mouth 2 (Two) Times a Day., Disp: , Rfl: 2  •  levETIRAcetam (KEPPRA) 500 MG tablet, TAKE 1 TABLET BY MOUTH  EVERY MORNING AND 2 TABLETS EVERY EVENING, Disp: 90 tablet, Rfl: 5  •  Levonorgest-Eth Estrad 91-Day (AMETHIA) 0.15-0.03 &0.01 MG tablet, Take 1 tablet by mouth Daily., Disp: , Rfl:   •  levothyroxine (SYNTHROID, LEVOTHROID) 25 MCG tablet, Take 25 mcg by mouth daily., Disp: , Rfl:   •  memantine (NAMENDA) 5 MG tablet, Take 1 tablet by mouth 2 (Two) Times a Day., Disp: 60 tablet, Rfl: 5  •  naproxen (NAPROSYN) 500 MG tablet, Take 1 tablet by mouth 2 (Two) Times a Day As Needed for Moderate Pain  or Headache., Disp: 30 tablet, Rfl: 5  •  pantoprazole (PROTONIX) 40 MG EC tablet, Take 40 mg by mouth 30 minute prior to breakfast, Disp: 30 tablet, Rfl: 3  •  prochlorperazine (COMPAZINE) 10 MG tablet, TAKE 1 TABLET BY MOUTH EVERY 6 HOURS AS NEEDED FOR NAUSEA OR VOMITING, Disp: 60 tablet, Rfl: 0  •  fesoterodine fumarate (TOVIAZ ER) 4 MG tablet sustained-release 24 hour tablet, Take 1 tablet by mouth Daily., Disp: 30 tablet, Rfl: 11  •  metoprolol tartrate (LOPRESSOR) 25 MG tablet, Take 25 mg by mouth Daily. Take 1.2 twice daily, Disp: , Rfl: 3    Past Medical History:   Diagnosis Date   • Disease of thyroid gland     hypothyroid   • Fibromyalgia    • GERD (gastroesophageal reflux disease)    • Migraines        Past Surgical History:   Procedure Laterality Date   •  SECTION     • ENDOSCOPY N/A 2019    Normal 1st and 2nd portions of the duodenum-biopsied; Normal stomach; LA grade A esophagitis; No esophageal abnormality to explain patient's dysphagia-esophagus dilated        Social History     Socioeconomic History   • Marital status:      Spouse name: Not on file   • Number of children: Not on file   • Years of education: Not on file   • Highest education level: Not on file   Tobacco Use   • Smoking status: Never Smoker   • Smokeless tobacco: Never Used   Substance and Sexual Activity   • Alcohol use: No   • Drug use: No   • Sexual activity: Defer       Family History   Problem Relation Age of Onset   •  "No Known Problems Mother    • Cancer Father    • Colon cancer Paternal Uncle    • Migraines Brother    • Colon polyps Neg Hx    • Seizures Neg Hx    • Stroke Neg Hx        Objective    Temp 97.7 °F (36.5 °C)   Ht 157.5 cm (62\")   Wt 98.9 kg (218 lb)   BMI 39.87 kg/m²     Physical Exam   Constitutional: She is oriented to person, place, and time. She appears well-developed.   HENT:   Head: Normocephalic.   Pulmonary/Chest: Effort normal. No respiratory distress.   Abdominal: Soft. She exhibits no distension.   Musculoskeletal: She exhibits no edema.   Neurological: She is alert and oriented to person, place, and time.   Skin: Skin is warm and dry.   Psychiatric: She has a normal mood and affect. Her behavior is normal.   Vitals reviewed.    Patient's Body mass index is 39.87 kg/m². BMI is above normal parameters. Recommendations include: educational material.      Office Visit on 05/17/2019   Component Date Value Ref Range Status   • Color 05/17/2019 Yellow  Yellow, Straw, Dark Yellow, Patricia Final   • Clarity, UA 05/17/2019 Clear  Clear Final   • Glucose, UA 05/17/2019 Negative  Negative, 1000 mg/dL (3+) mg/dL Final   • Bilirubin 05/17/2019 Negative  Negative Final   • Ketones, UA 05/17/2019 Negative  Negative Final   • Specific Gravity  05/17/2019 1.005  1.005 - 1.030 Final   • Blood, UA 05/17/2019 Small* Negative Final   • pH, Urine 05/17/2019 6.5  5.0 - 8.0 Final   • Protein, POC 05/17/2019 Negative  Negative mg/dL Final   • Urobilinogen, UA 05/17/2019 Normal  Normal Final   • Leukocytes 05/17/2019 Negative  Negative Final   • Nitrite, UA 05/17/2019 Negative  Negative Final       Results for orders placed or performed in visit on 08/23/19   POC Urinalysis Dipstick, Multipro   Result Value Ref Range    Color Yellow Yellow, Straw, Dark Yellow, Patricia    Clarity, UA Clear Clear    Glucose, UA Negative Negative, 1000 mg/dL (3+) mg/dL    Bilirubin Negative Negative    Ketones, UA Negative Negative    Specific Gravity "  1.005 1.005 - 1.030    Blood, UA Small (A) Negative    pH, Urine 6.0 5.0 - 8.0    Protein, POC Negative Negative mg/dL    Urobilinogen, UA Normal Normal    Nitrite, UA Negative Negative    Leukocytes Trace (A) Negative        Assessment/Plan   Assessment and Plan    Talita was seen today for blood in urine, urinary frequency and urinary urgency.    Diagnoses and all orders for this visit:    Microscopic hematuria  -     POC Urinalysis Dipstick, Multipro    Frequency of micturition  -     fesoterodine fumarate (TOVIAZ ER) 4 MG tablet sustained-release 24 hour tablet; Take 1 tablet by mouth Daily.    Pelvic pain    Cystitis, chronic    Dyspareunia, female      Patient presents for follow-up of lower urinary tract symptoms.  At her last visit, we began a trial of Zyrtec in addition to her dietary changes.  She states she has noticed a decrease in her symptoms.  They are not as bothersome now.  She is still having significant issues with dyspareunia.  I have recommended she follow-up with her OB/GYN for this as I am unsure is related to a urologic issue.  Dr. Navarro did note her urethra was normal in appearance on a recent cystoscopy.    We have been treating her for suspected interstitial cystitis.  Her frequency and urgency are still bothersome.  We will try an anticholinergic to improve the symptoms.  I have sent in Toviaz 4 mg.  She will follow-up in 3 months for symptom check and bladder scan.

## 2019-08-23 ENCOUNTER — OFFICE VISIT (OUTPATIENT)
Dept: UROLOGY | Facility: CLINIC | Age: 39
End: 2019-08-23

## 2019-08-23 VITALS — BODY MASS INDEX: 40.12 KG/M2 | WEIGHT: 218 LBS | HEIGHT: 62 IN | TEMPERATURE: 97.7 F

## 2019-08-23 DIAGNOSIS — R31.29 MICROSCOPIC HEMATURIA: Primary | ICD-10-CM

## 2019-08-23 DIAGNOSIS — N94.10 DYSPAREUNIA, FEMALE: ICD-10-CM

## 2019-08-23 DIAGNOSIS — R10.2 PELVIC PAIN: ICD-10-CM

## 2019-08-23 DIAGNOSIS — N30.20 CYSTITIS, CHRONIC: ICD-10-CM

## 2019-08-23 DIAGNOSIS — R35.0 FREQUENCY OF MICTURITION: ICD-10-CM

## 2019-08-23 LAB
BILIRUB BLD-MCNC: NEGATIVE MG/DL
CLARITY, POC: CLEAR
COLOR UR: YELLOW
GLUCOSE UR STRIP-MCNC: NEGATIVE MG/DL
KETONES UR QL: NEGATIVE
LEUKOCYTE EST, POC: ABNORMAL
NITRITE UR-MCNC: NEGATIVE MG/ML
PH UR: 6 [PH] (ref 5–8)
PROT UR STRIP-MCNC: NEGATIVE MG/DL
RBC # UR STRIP: ABNORMAL /UL
SP GR UR: 1 (ref 1–1.03)
UROBILINOGEN UR QL: NORMAL

## 2019-08-23 PROCEDURE — 99214 OFFICE O/P EST MOD 30 MIN: CPT | Performed by: NURSE PRACTITIONER

## 2019-08-23 PROCEDURE — 81001 URINALYSIS AUTO W/SCOPE: CPT | Performed by: NURSE PRACTITIONER

## 2019-08-23 RX ORDER — FESOTERODINE FUMARATE 4 MG/1
4 TABLET, EXTENDED RELEASE ORAL
Qty: 30 TABLET | Refills: 11 | Status: SHIPPED | OUTPATIENT
Start: 2019-08-23 | End: 2019-09-10

## 2019-08-23 NOTE — PATIENT INSTRUCTIONS

## 2019-09-10 ENCOUNTER — OFFICE VISIT (OUTPATIENT)
Dept: CARDIOLOGY | Facility: CLINIC | Age: 39
End: 2019-09-10

## 2019-09-10 VITALS
BODY MASS INDEX: 40.48 KG/M2 | HEIGHT: 62 IN | SYSTOLIC BLOOD PRESSURE: 140 MMHG | DIASTOLIC BLOOD PRESSURE: 90 MMHG | OXYGEN SATURATION: 99 % | WEIGHT: 220 LBS | HEART RATE: 106 BPM

## 2019-09-10 DIAGNOSIS — R00.2 PALPITATIONS: Primary | ICD-10-CM

## 2019-09-10 DIAGNOSIS — N30.20 CYSTITIS, CHRONIC: ICD-10-CM

## 2019-09-10 DIAGNOSIS — I10 ESSENTIAL HYPERTENSION: ICD-10-CM

## 2019-09-10 PROCEDURE — 93000 ELECTROCARDIOGRAM COMPLETE: CPT | Performed by: INTERNAL MEDICINE

## 2019-09-10 PROCEDURE — 99204 OFFICE O/P NEW MOD 45 MIN: CPT | Performed by: INTERNAL MEDICINE

## 2019-09-10 NOTE — PROGRESS NOTES
Subjective:     Encounter Date:09/10/2019      Patient ID: Talita English is a 39 y.o. female with history of migraine headaches and chronic intermittent palpitations, referred for further evaluation of palpitations.    Referring provider: Branden Parks MD  Reason for referral: Palpitations    Chief Complaint: Palpitations    Palpitations    This is a recurrent problem. The problem occurs intermittently. The problem has been waxing and waning. Nothing aggravates the symptoms. Associated symptoms include chest pain, malaise/fatigue, nausea, shortness of breath and vomiting. Pertinent negatives include no coughing, dizziness, fever, numbness or syncope. She has tried beta blockers for the symptoms. The treatment provided moderate relief.      This is a 39-year-old female with a history of intermittent migraine headaches, hypothyroidism, referred for further evaluation of intermittent palpitations.  The patient notes that she started to have worsening symptoms back in April.  At that time, she was at work and had an episode where she felt nauseated and somewhat clammy.  Her blood pressure was checked and found to be elevated.  On consecutive checks shortly afterwards, blood pressure continued to increase.  She did go to the emergency department at that time.  Reportedly, the work-up there was unremarkable.  This was followed with a stress test which was a low risk stress test.  The patient has had a few episodes of palpitations since that time.  She describes one episode where she woke up from sleep and felt like her heart was racing.  She got up and walked around for a few minutes and started to breathe heavy and slowly and her symptoms resolved.  No other prolonged episodes or any during which she was asymptomatic as the one back in April.      The patient does have a history of hypertension.  She is currently on metoprolol and her blood pressure seems to be somewhat better controlled recently,  although still not optimal.  She describes her palpitations as occurring intermittently, not on a daily basis.    The following portions of the patient's history were reviewed and updated as appropriate: allergies, current medications, past family history, past medical history, past social history, past surgical history and problem list.     Past Medical History:   Diagnosis Date   • Disease of thyroid gland     hypothyroid   • Fibromyalgia    • GERD (gastroesophageal reflux disease)    • Migraines      Past Surgical History:   Procedure Laterality Date   •  SECTION     • ENDOSCOPY N/A 2019    Normal 1st and 2nd portions of the duodenum-biopsied; Normal stomach; LA grade A esophagitis; No esophageal abnormality to explain patient's dysphagia-esophagus dilated        Current Outpatient Medications:   •  cyclobenzaprine (FLEXERIL) 10 MG tablet, Take 10 mg by mouth 3 (Three) Times a Day As Needed for Muscle Spasms., Disp: , Rfl:   •  Erenumab-aooe 140 MG/ML solution auto-injector, Inject 1 mL under the skin into the appropriate area as directed Every 30 (Thirty) Days., Disp: 1 pen, Rfl: 5  •  levETIRAcetam (KEPPRA) 500 MG tablet, TAKE 1 TABLET BY MOUTH EVERY MORNING AND 2 TABLETS EVERY EVENING, Disp: 90 tablet, Rfl: 5  •  Levonorgest-Eth Estrad 91-Day (AMETHIA) 0.15-0.03 &0.01 MG tablet, Take 1 tablet by mouth Daily., Disp: , Rfl:   •  levothyroxine (SYNTHROID, LEVOTHROID) 50 MCG tablet, Take 50 mcg by mouth Daily., Disp: , Rfl:   •  memantine (NAMENDA) 5 MG tablet, Take 1 tablet by mouth 2 (Two) Times a Day., Disp: 60 tablet, Rfl: 5  •  metoprolol tartrate (LOPRESSOR) 25 MG tablet, Take 1 tablet by mouth 2 (Two) Times a Day., Disp: 180 tablet, Rfl: 3  •  naproxen (NAPROSYN) 500 MG tablet, Take 1 tablet by mouth 2 (Two) Times a Day As Needed for Moderate Pain  or Headache., Disp: 30 tablet, Rfl: 5  •  prochlorperazine (COMPAZINE) 10 MG tablet, TAKE 1 TABLET BY MOUTH EVERY 6 HOURS AS NEEDED FOR NAUSEA OR  VOMITING, Disp: 60 tablet, Rfl: 0  •  cetirizine (zyrTEC) 10 MG tablet, TAKE 1 TABLET BY MOUTH DAILY, Disp: 30 tablet, Rfl: 0    Allergies   Allergen Reactions   • Codeine Nausea And Vomiting   • Elavil [Amitriptyline Hcl] Rash   • Maxalt [Rizatriptan Benzoate] Nausea Only   • Relpax [Eletriptan] Nausea Only     Social History     Tobacco Use   • Smoking status: Never Smoker   • Smokeless tobacco: Never Used   Substance Use Topics   • Alcohol use: Yes     Comment: rarely     Family History   Problem Relation Age of Onset   • Cancer Mother         breast   • Hypertension Mother    • Cancer Father    • Heart attack Father    • Heart disease Father    • Arrhythmia Father    • Colon cancer Paternal Uncle    • Migraines Brother    • Hypertension Brother    • Heart attack Sister    • Hypertension Sister    • Heart disease Maternal Grandmother    • Heart attack Maternal Grandfather    • Heart attack Paternal Grandmother    • Aneurysm Paternal Grandfather    • Colon polyps Neg Hx    • Seizures Neg Hx    • Stroke Neg Hx      Review of Systems   Constitution: Positive for malaise/fatigue and weight gain. Negative for chills, fever, night sweats and weight loss.   HENT: Negative for congestion and hearing loss.    Eyes: Positive for blurred vision. Negative for pain.   Cardiovascular: Positive for chest pain and palpitations. Negative for claudication, leg swelling, orthopnea and syncope.   Respiratory: Positive for shortness of breath. Negative for cough, hemoptysis and wheezing.    Endocrine: Negative for cold intolerance, heat intolerance, polydipsia and polyuria.   Hematologic/Lymphatic: Negative for adenopathy and bleeding problem. Does not bruise/bleed easily.   Skin: Negative for color change, poor wound healing and rash.   Musculoskeletal: Positive for neck pain. Negative for arthritis, back pain, joint pain, joint swelling and myalgias.   Gastrointestinal: Positive for heartburn, nausea and vomiting. Negative for  abdominal pain, change in bowel habit, constipation, diarrhea, hematochezia and melena.   Genitourinary: Negative for frequency, hematuria and nocturia.   Neurological: Positive for headaches. Negative for dizziness, focal weakness, light-headedness, loss of balance and numbness.   Psychiatric/Behavioral: Negative for altered mental status, memory loss and substance abuse.   Allergic/Immunologic: Negative for hives and persistent infections.       ECG 12 Lead  Date/Time: 9/10/2019 2:19 PM  Performed by: Popeye Flores MD  Authorized by: Popeye Flores MD   Comparison: compared with previous ECG from 11/29/2017  Similar to previous ECG  Rhythm: sinus rhythm  Rate: normal  Conduction: conduction normal  ST Segments: ST segments normal  T Waves: T waves normal  QRS axis: normal  Other: no other findings    Clinical impression: normal ECG             Objective:     Physical Exam   Constitutional: She is oriented to person, place, and time. Vital signs are normal. She appears well-developed and well-nourished. She is cooperative.  Non-toxic appearance. No distress.   HENT:   Head: Normocephalic and atraumatic.   Right Ear: External ear normal.   Left Ear: External ear normal.   Nose: Nose normal.   Mouth/Throat: Uvula is midline, oropharynx is clear and moist and mucous membranes are normal. Mucous membranes are not pale, not dry and not cyanotic. No oropharyngeal exudate.   Eyes: EOM and lids are normal. Pupils are equal, round, and reactive to light.   Neck: Normal range of motion. Neck supple. No hepatojugular reflux and no JVD present. Carotid bruit is not present. No tracheal deviation and no edema present. No thyroid mass and no thyromegaly present.   Cardiovascular: Normal rate, regular rhythm, S1 normal, S2 normal, normal heart sounds, intact distal pulses and normal pulses.  No extrasystoles are present. PMI is not displaced. Exam reveals no gallop and no friction rub.   No murmur  "heard.  Pulses:       Radial pulses are 2+ on the right side, and 2+ on the left side.        Femoral pulses are 2+ on the right side, and 2+ on the left side.       Dorsalis pedis pulses are 2+ on the right side, and 2+ on the left side.        Posterior tibial pulses are 2+ on the right side, and 2+ on the left side.   Pulmonary/Chest: Effort normal and breath sounds normal. No accessory muscle usage. No respiratory distress. She has no wheezes. She has no rales. She exhibits no tenderness.   Abdominal: Soft. Normal appearance and bowel sounds are normal. She exhibits no distension, no abdominal bruit and no pulsatile midline mass. There is no hepatosplenomegaly. There is no tenderness.   Musculoskeletal: Normal range of motion. She exhibits no edema, tenderness or deformity.   Lymphadenopathy:     She has no cervical adenopathy.   Neurological: She is oriented to person, place, and time. She has normal strength. No cranial nerve deficit.   Skin: Skin is warm, dry and intact. No rash noted. She is not diaphoretic. No cyanosis or erythema. Nails show no clubbing.   Psychiatric: She has a normal mood and affect. Her speech is normal and behavior is normal. Thought content normal.   Vitals reviewed.    /90 (BP Location: Right arm, Patient Position: Sitting, Cuff Size: Large Adult)   Pulse 106   Ht 157.5 cm (62\")   Wt 99.8 kg (220 lb)   SpO2 99%   BMI 40.24 kg/m²     Data/Lab Review:     I reviewed the report of a stress test from 6/13/2019: The resting ECG shows normal sinus rhythm with PVCs, functional capacity was listed as being normal with an appropriate heart rate response and no significant ECG changes.  The patient did walk on a standard [place] protocol for 7 minutes and 16 seconds with normal heart rate and blood pressure response.      Assessment:          Diagnosis Plan   1. Palpitations  Holter Monitor - 72 Hour Up To 21 Days    ECG 12 Lead   2. Essential hypertension  metoprolol tartrate " (LOPRESSOR) 25 MG tablet          Plan:       1.  Palpitations: Patient describes palpitations that are intermittent.  So far, no obvious rhythm abnormalities have been identified.  Given sporadic symptoms, I think that a 14-day patch monitor is reasonable to further evaluate for any obvious arrhythmias.  The patient is on beta-blocker at this time which seems to help may be somewhat with her symptoms.    2.  Essential hypertension: Blood pressure still mildly elevated although is under somewhat better control.  I will increase metoprolol from 12 and half milligrams twice daily to 25 mg twice daily to help both with heart rate/palpitations and blood pressure.    It should be noted that this patient has significant migraine headaches which are largely debilitating for her.  Apparently, Imitrex helps significantly.  I do not see any obvious contraindication from a cardiac standpoint for her being on Imitrex on an as-needed basis at this time.    Patient's Body mass index is 40.24 kg/m². BMI is above normal parameters. Recommendations include: exercise counseling and nutrition counseling.    Follow-up: Pending the results the patient's cardiac monitor.

## 2019-09-11 RX ORDER — CETIRIZINE HYDROCHLORIDE 10 MG/1
10 TABLET ORAL DAILY
Qty: 30 TABLET | Refills: 0 | Status: SHIPPED | OUTPATIENT
Start: 2019-09-11 | End: 2019-10-06 | Stop reason: SDUPTHER

## 2019-09-20 ENCOUNTER — OFFICE VISIT (OUTPATIENT)
Dept: NEUROLOGY | Facility: CLINIC | Age: 39
End: 2019-09-20

## 2019-09-20 VITALS
BODY MASS INDEX: 40.48 KG/M2 | SYSTOLIC BLOOD PRESSURE: 132 MMHG | WEIGHT: 220 LBS | DIASTOLIC BLOOD PRESSURE: 90 MMHG | HEIGHT: 62 IN | HEART RATE: 80 BPM | OXYGEN SATURATION: 98 %

## 2019-09-20 DIAGNOSIS — I10 ESSENTIAL HYPERTENSION: ICD-10-CM

## 2019-09-20 DIAGNOSIS — R00.2 PALPITATIONS: ICD-10-CM

## 2019-09-20 DIAGNOSIS — G43.719 INTRACTABLE CHRONIC MIGRAINE WITHOUT AURA AND WITHOUT STATUS MIGRAINOSUS: Primary | ICD-10-CM

## 2019-09-20 PROCEDURE — 99213 OFFICE O/P EST LOW 20 MIN: CPT | Performed by: CLINICAL NURSE SPECIALIST

## 2019-09-20 RX ORDER — SUMATRIPTAN 50 MG/1
TABLET, FILM COATED ORAL
Qty: 9 TABLET | Refills: 2 | Status: SHIPPED | OUTPATIENT
Start: 2019-09-20 | End: 2020-03-18 | Stop reason: SDUPTHER

## 2019-09-20 RX ORDER — MEMANTINE HYDROCHLORIDE 5 MG/1
5 TABLET ORAL 2 TIMES DAILY
Qty: 60 TABLET | Refills: 5 | Status: SHIPPED | OUTPATIENT
Start: 2019-09-20 | End: 2020-03-13

## 2019-09-20 RX ORDER — LEVETIRACETAM 500 MG/1
TABLET ORAL
Qty: 90 TABLET | Refills: 5 | Status: SHIPPED | OUTPATIENT
Start: 2019-09-20 | End: 2020-03-13

## 2019-09-20 NOTE — PROGRESS NOTES
Subjective     Chief Complaint   Patient presents with   • Migraine     pt states aimovig is working, states since may she has had some cluster HA and some hypertenson that caused more HA. pt states last month she had 2 migraines.          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 She is here today for follow up for migraines. She was last seen 5/19. She is by herself today. She has  Continued with Keppra 500 mg AM and 1000 mg PM, namenda 5 mg BID and Aimovig 140 mg monthly. At last visit was going to transition to Emgality as was having problems getting Aimovig but has been able to stay on Aimovig. She tells me her drug store has difficulty keeping this stocked and some times will go 5 weeks and at least once 6 weeks between injections. At last visit was having elevated BP and palpitations. Had recommendations to d/c imitrex until BP and palpitations evaluated and treated. Had tried compazine but could not get relief. She tells me after last visit had severe HA for 4-5 days and did have to take Imitrex for relief. She had seen Dr. Flores and is wearing Zio patch. PCp and cardiology has adjusted medications and BP and palpitations improving. Metoprolol increased to 25 mg BID. Thyroid medications adjusted as well. In the past would run out of imitrex each month and now having imitrex remaining each month, requiring less imitrex monthly.  Per patient, cardiology has cleared patient to use imitrex.     At any rate, patient reports improvement of HAs since starting Aimovig 4/2019. Some dull HA about 3 days per week, last about 2-3 hours 6-7 hours. Has not missed work because of HA. Will feel pressure in center of head. Remains hydrated. Right eye would twitch. Has noticed will have left sided HA with elevated BP. Patient to continue to monitor.   having About 2 migraines per month. Has takne imitrex 9/1/19. Compazine has not been effective.     She states has some mild redness and  itching at injection site.     4/11/19 patient did go to ED in [place] as she was having extremely elevated BP with  lightheadedness, back pain and diaphoresis. Work up was negative. She does have family hx of MI in 40s in her father and sister. She continues to monitor her BP and tells me has been elevated. She has upcoming appointment with PCP in a few weeks.  Having delay in getting aimovig from her drug store and sometimes going 5-6 weeks between doses.       Migraine    This is a chronic problem. The current episode started more than 1 year ago (while in college). The problem occurs daily. Progression since onset: significant improvement with indocin and has had one severe headache lastin 5 days with nausea/vomiting. The pain is located in the occipital and bilateral (as well as right side of head) region. The quality of the pain is described as stabbing and sharp. The pain is at a severity of 8/10. The pain is severe. Associated symptoms include nausea, numbness (occasional tingling in feet at times), phonophobia, photophobia and vomiting. Pertinent negatives include no dizziness, fever, neck pain, rhinorrhea, sore throat or weakness. The symptoms are aggravated by weather changes and menstrual cycle. Treatments tried: topamax, vimpat, maxalt, relpax, elavil, fioricet, DHE, Keppra, Indocin, botox, zomig, DHE, compazine. The treatment provided moderate relief.        Current Outpatient Medications   Medication Sig Dispense Refill   • cetirizine (zyrTEC) 10 MG tablet TAKE 1 TABLET BY MOUTH DAILY 30 tablet 0   • cyclobenzaprine (FLEXERIL) 10 MG tablet Take 10 mg by mouth 3 (Three) Times a Day As Needed for Muscle Spasms.     • Erenumab-aooe 140 MG/ML solution auto-injector Inject 1 mL under the skin into the appropriate area as directed Every 30 (Thirty) Days. 1 pen 5   • levETIRAcetam (KEPPRA) 500 MG tablet TAKE 1 TABLET BY MOUTH EVERY MORNING AND 2 TABLETS EVERY EVENING 90 tablet 5   • Levonorgest-Eth Estrad  91-Day (AMETHIA) 0.15-0.03 &0.01 MG tablet Take 1 tablet by mouth Daily.     • levothyroxine (SYNTHROID, LEVOTHROID) 50 MCG tablet Take 50 mcg by mouth Daily.     • memantine (NAMENDA) 5 MG tablet Take 1 tablet by mouth 2 (Two) Times a Day. 60 tablet 5   • metoprolol tartrate (LOPRESSOR) 25 MG tablet Take 1 tablet by mouth 2 (Two) Times a Day. 180 tablet 3   • naproxen (NAPROSYN) 500 MG tablet Take 1 tablet by mouth 2 (Two) Times a Day As Needed for Moderate Pain  or Headache. 30 tablet 5   • prochlorperazine (COMPAZINE) 10 MG tablet TAKE 1 TABLET BY MOUTH EVERY 6 HOURS AS NEEDED FOR NAUSEA OR VOMITING 60 tablet 0   • SUMAtriptan (IMITREX) 50 MG tablet Take one tablet at onset of headache. May repeat dose one time in 2 hours if headache not relieved. 9 tablet 2     No current facility-administered medications for this visit.        Past Medical History:   Diagnosis Date   • Disease of thyroid gland     hypothyroid   • Fibromyalgia    • GERD (gastroesophageal reflux disease)    • Migraines        Past Surgical History:   Procedure Laterality Date   •  SECTION     • ENDOSCOPY N/A 2019    Normal 1st and 2nd portions of the duodenum-biopsied; Normal stomach; LA grade A esophagitis; No esophageal abnormality to explain patient's dysphagia-esophagus dilated        family history includes Aneurysm in her paternal grandfather; Arrhythmia in her father; Cancer in her father and mother; Colon cancer in her paternal uncle; Heart attack in her father, maternal grandfather, paternal grandmother, and sister; Heart disease in her father and maternal grandmother; Hypertension in her brother, mother, and sister; Migraines in her brother.    Social History     Tobacco Use   • Smoking status: Never Smoker   • Smokeless tobacco: Never Used   Substance Use Topics   • Alcohol use: Yes     Comment: rarely   • Drug use: No       Review of Systems   Constitutional: Positive for fatigue. Negative for fever.   HENT: Negative for  "postnasal drip, rhinorrhea and sore throat.    Eyes: Positive for photophobia.   Respiratory: Positive for shortness of breath. Negative for apnea and choking.    Cardiovascular: Positive for palpitations. Negative for chest pain.   Gastrointestinal: Positive for constipation, nausea and vomiting. Negative for diarrhea.        Recently having sensation as though \"food stuck\"   Endocrine: Negative.  Negative for cold intolerance and heat intolerance.   Genitourinary: Negative.  Negative for dysuria and frequency.   Musculoskeletal: Positive for arthralgias. Negative for gait problem, joint swelling, myalgias and neck pain.   Skin: Negative.    Allergic/Immunologic: Negative.    Neurological: Positive for numbness (occasional tingling in feet at times) and headaches. Negative for dizziness, tremors, speech difficulty and weakness.   Hematological: Negative.  Negative for adenopathy.   Psychiatric/Behavioral: Negative.  Negative for agitation, confusion and hallucinations.   All other systems reviewed and are negative.      Objective     /90 (BP Location: Left arm, Patient Position: Sitting, Cuff Size: Adult)   Pulse 80   Ht 157.5 cm (62\")   Wt 99.8 kg (220 lb)   SpO2 98%   BMI 40.24 kg/m² , Body mass index is 40.24 kg/m².    Physical Exam   Constitutional: She is oriented to person, place, and time. Vital signs are normal. She appears well-developed and well-nourished. She is cooperative.   HENT:   Head: Normocephalic and atraumatic.   Right Ear: Hearing and external ear normal.   Left Ear: Hearing and external ear normal.   Nose: Nose normal.   Mouth/Throat: Uvula is midline, oropharynx is clear and moist and mucous membranes are normal.   Eyes: Conjunctivae, EOM and lids are normal. Pupils are equal, round, and reactive to light. Right eye exhibits normal extraocular motion and no nystagmus. Left eye exhibits normal extraocular motion and no nystagmus. Right pupil is round and reactive. Left pupil is " round and reactive. Pupils are equal.   Limited fundoscopic exam WNL   Neck: Trachea normal and normal range of motion. Neck supple. No muscular tenderness present. Carotid bruit is not present. No thyromegaly present.   Cardiovascular: Normal rate, regular rhythm and normal heart sounds.   No murmur heard.  Pulmonary/Chest: Effort normal and breath sounds normal. She has no decreased breath sounds. She has no rhonchi.   Abdominal: Soft. Bowel sounds are normal.   Musculoskeletal: Normal range of motion.   Neurological: She is alert and oriented to person, place, and time. She has normal strength and normal reflexes. She displays no atrophy and no tremor. No cranial nerve deficit or sensory deficit. She exhibits normal muscle tone. She displays a negative Romberg sign. Coordination and gait normal.   Reflex Scores:       Tricep reflexes are 2+ on the right side and 2+ on the left side.       Bicep reflexes are 2+ on the right side and 2+ on the left side.       Brachioradialis reflexes are 2+ on the right side and 2+ on the left side.       Patellar reflexes are 2+ on the right side and 2+ on the left side.       Achilles reflexes are 2+ on the right side and 2+ on the left side.  CN II:  Visual fields full.  Pupils equally reactive to light  CN III, IV, VI:  Extraocular Muscles full with no signs of nystagmus  CN V:  Facial sensory is symmetric with no asymetries.  CN VII:  Facial motor symmetric  CN VIII:  Gross hearing intact bilaterally  CN IX:  Palate elevates symmetrically  CN X:  Palate elevates symmetrically  CN XI:  Shoulder shrug symmetric  CN XII:  Tongue is midline on protrusion    Full and symmetric strength bilateral upper and lower extremities   Skin: Skin is warm and dry.   Psychiatric: She has a normal mood and affect. Her speech is normal and behavior is normal. Cognition and memory are normal.   Nursing note and vitals reviewed.      Results for orders placed or performed in visit on 08/23/19    POC Urinalysis Dipstick, Multipro   Result Value Ref Range    Color Yellow Yellow, Straw, Dark Yellow, Patricia    Clarity, UA Clear Clear    Glucose, UA Negative Negative, 1000 mg/dL (3+) mg/dL    Bilirubin Negative Negative    Ketones, UA Negative Negative    Specific Gravity  1.005 1.005 - 1.030    Blood, UA Small (A) Negative    pH, Urine 6.0 5.0 - 8.0    Protein, POC Negative Negative mg/dL    Urobilinogen, UA Normal Normal    Nitrite, UA Negative Negative    Leukocytes Trace (A) Negative        ASSESSMENT/PLAN    Diagnoses and all orders for this visit:    Intractable chronic migraine without aura and without status migrainosus    Essential hypertension    Palpitations    Other orders  -     levETIRAcetam (KEPPRA) 500 MG tablet; TAKE 1 TABLET BY MOUTH EVERY MORNING AND 2 TABLETS EVERY EVENING  -     memantine (NAMENDA) 5 MG tablet; Take 1 tablet by mouth 2 (Two) Times a Day.  -     SUMAtriptan (IMITREX) 50 MG tablet; Take one tablet at onset of headache. May repeat dose one time in 2 hours if headache not relieved.       MEDICAL DECISION MAKIN. Continue with Keppra 500 mg in AM and 1000 mg PM for migraine prophylaxis.  2. Continue  Namenda 5 mg BID and counseled on side effects  3. resume  Imitrex  50 mg prn. Counseled to notify if develops symptoms of chest pain, SOB, palpitations with use.    4.  continue Compazine and naprysyn PRN for migraine treatment.  5. continueAimovig to 140 mg . Counseled in the studies reported improvement after 4-6 months of 50% reduction in number of HA/migraines. Patient reports having difficulty getting this from her pharmacy and seems to also possible financial ricardo,       7. Patient had previously responded to DHE for migraine treatment but in light of recent hypertensive events and possible cardiac work up this would have to be considered carefully and may no longer be an option for treatment.   8. Patient's Body mass index is 40.24 kg/m². BMI is above normal  parameters. Recommendations include: educational material.      HPI and ROS reviewed and updated.      allergies and all known medications/prescriptions have been reviewed using resources available on this encounter.    Return in about 6 months (around 3/20/2020).        HOLLY Loja

## 2019-09-20 NOTE — PATIENT INSTRUCTIONS

## 2019-09-23 ENCOUNTER — TELEPHONE (OUTPATIENT)
Dept: NEUROLOGY | Facility: CLINIC | Age: 39
End: 2019-09-23

## 2019-09-23 NOTE — TELEPHONE ENCOUNTER
Talita left a message that she was taking Imitrex 100 mg.  She wants to know if you will send a script for 100 mg instead of 50 mg.

## 2019-09-23 NOTE — TELEPHONE ENCOUNTER
Although cardiology has cleared her to take triptan, with her still having some BP and tachycardia issues I would like to keep at 50 mg.

## 2019-10-06 DIAGNOSIS — N30.20 CYSTITIS, CHRONIC: ICD-10-CM

## 2019-10-07 RX ORDER — CETIRIZINE HYDROCHLORIDE 10 MG/1
10 TABLET ORAL DAILY
Qty: 30 TABLET | Refills: 0 | Status: SHIPPED | OUTPATIENT
Start: 2019-10-07 | End: 2020-10-07 | Stop reason: SDUPTHER

## 2019-10-17 ENCOUNTER — TELEPHONE (OUTPATIENT)
Dept: CARDIOLOGY | Facility: CLINIC | Age: 39
End: 2019-10-17

## 2019-10-18 NOTE — TELEPHONE ENCOUNTER
Below are the results of this patient's monitor.  No prolonged rhythm abnormalities were noted.  No high risk features were identified.  As long as the patient is feeling okay, no further cardiac work-up would be indicated at this time.  Certainly, if the patient continues to have palpitations in the future, we can place her in a longer monitor to see if we can catch something, however I feel that the results of this monitor are encouraging.    · An abnormal monitor study.  · The predominant rhythm noted during the testing period was sinus rhythm.  · Average HR: 82. Min HR: 43. Max HR: 240.  · Premature atrial contractions occured rarely. There were 6 short episodes of supraventricular tachycardia, the longest being 9 beats in duration, during which no symptoms were reported.  · Premature ventricular contractions occured rarely.  · Reported symptoms correlated to isoalted atrial ectopic beats.

## 2019-10-18 NOTE — TELEPHONE ENCOUNTER
Gave patient results of holter monitor. She verbalized understanding. She also said she has not had as many palpitations as she was before.

## 2020-03-13 RX ORDER — MEMANTINE HYDROCHLORIDE 5 MG/1
TABLET ORAL
Qty: 60 TABLET | Refills: 0 | Status: SHIPPED | OUTPATIENT
Start: 2020-03-13 | End: 2020-03-18 | Stop reason: SDUPTHER

## 2020-03-13 RX ORDER — LEVETIRACETAM 500 MG/1
TABLET ORAL
Qty: 90 TABLET | Refills: 0 | Status: SHIPPED | OUTPATIENT
Start: 2020-03-13 | End: 2020-03-18 | Stop reason: SDUPTHER

## 2020-03-13 RX ORDER — LEVETIRACETAM 500 MG/1
TABLET ORAL
Qty: 270 TABLET | OUTPATIENT
Start: 2020-03-13

## 2020-03-16 RX ORDER — LEVOTHYROXINE SODIUM 0.05 MG/1
50 TABLET ORAL DAILY
Qty: 90 TABLET | Refills: 3 | Status: SHIPPED | OUTPATIENT
Start: 2020-03-16 | End: 2021-01-25

## 2020-03-18 ENCOUNTER — TELEPHONE (OUTPATIENT)
Dept: NEUROLOGY | Facility: CLINIC | Age: 40
End: 2020-03-18

## 2020-03-18 DIAGNOSIS — G43.719 INTRACTABLE CHRONIC MIGRAINE WITHOUT AURA AND WITHOUT STATUS MIGRAINOSUS: Primary | ICD-10-CM

## 2020-03-18 RX ORDER — SUMATRIPTAN 50 MG/1
TABLET, FILM COATED ORAL
Qty: 9 TABLET | Refills: 2 | Status: CANCELLED | OUTPATIENT
Start: 2020-03-18

## 2020-03-18 RX ORDER — MEMANTINE HYDROCHLORIDE 5 MG/1
5 TABLET ORAL 2 TIMES DAILY
Qty: 180 TABLET | Refills: 0 | Status: SHIPPED | OUTPATIENT
Start: 2020-03-18 | End: 2020-06-12 | Stop reason: SDUPTHER

## 2020-03-18 RX ORDER — SUMATRIPTAN 50 MG/1
TABLET, FILM COATED ORAL
Qty: 9 TABLET | Refills: 0 | Status: SHIPPED | OUTPATIENT
Start: 2020-03-18 | End: 2020-04-13

## 2020-03-18 RX ORDER — LEVETIRACETAM 500 MG/1
TABLET ORAL
Qty: 270 TABLET | Refills: 0 | Status: SHIPPED | OUTPATIENT
Start: 2020-03-18 | End: 2020-06-12 | Stop reason: SDUPTHER

## 2020-03-18 NOTE — TELEPHONE ENCOUNTER
Callback Number: [phone]     Patient has rescheduled her appointment do to the current epidemic to insure safety of her household. However she states that she will run out of the listed below medications and is requesting that Leandro Jang PA-C please honor her refills. Patient states that her pharmacy did not refill her medications for 90 days as usual and that is why she will need another refill in 30 days. Patient rescheduled her appointment for the first Friday available 6/12/2020 and has every intention on making this appointment.       levETIRAcetam (KEPPRA) 500 MG tablet  memantine (NAMENDA) 5 MG tablet   SUMAtriptan (IMITREX) 50 MG tablet

## 2020-04-12 DIAGNOSIS — G43.719 INTRACTABLE CHRONIC MIGRAINE WITHOUT AURA AND WITHOUT STATUS MIGRAINOSUS: ICD-10-CM

## 2020-04-13 RX ORDER — SUMATRIPTAN 50 MG/1
TABLET, FILM COATED ORAL
Qty: 9 TABLET | Refills: 0 | Status: SHIPPED | OUTPATIENT
Start: 2020-04-13 | End: 2020-09-14 | Stop reason: SDUPTHER

## 2020-06-10 ENCOUNTER — RESULTS ENCOUNTER (OUTPATIENT)
Dept: INTERNAL MEDICINE | Facility: CLINIC | Age: 40
End: 2020-06-10

## 2020-06-10 ENCOUNTER — TELEPHONE (OUTPATIENT)
Dept: INTERNAL MEDICINE | Facility: CLINIC | Age: 40
End: 2020-06-10

## 2020-06-10 DIAGNOSIS — E03.9 HYPOTHYROIDISM, UNSPECIFIED TYPE: ICD-10-CM

## 2020-06-10 DIAGNOSIS — I10 ESSENTIAL HYPERTENSION: ICD-10-CM

## 2020-06-10 DIAGNOSIS — E55.9 VITAMIN D DEFICIENCY: ICD-10-CM

## 2020-06-10 DIAGNOSIS — Z11.59 NEED FOR HEPATITIS C SCREENING TEST: ICD-10-CM

## 2020-06-10 DIAGNOSIS — I10 ESSENTIAL HYPERTENSION: Primary | ICD-10-CM

## 2020-06-10 NOTE — TELEPHONE ENCOUNTER
Patient called and stated that she is supposed to do blood work before her Physical with Bina Carmona next week and she is just wondering if she could have orders put in the system so she can come in on Friday morning to have them done. She will be around that day and would rather have them done then instead of driving an hour from where she lives next week.    Patient can be contacted at [phone].

## 2020-06-12 ENCOUNTER — OFFICE VISIT (OUTPATIENT)
Dept: NEUROLOGY | Facility: CLINIC | Age: 40
End: 2020-06-12

## 2020-06-12 ENCOUNTER — TELEPHONE (OUTPATIENT)
Dept: NEUROLOGY | Facility: CLINIC | Age: 40
End: 2020-06-12

## 2020-06-12 VITALS
BODY MASS INDEX: 40.48 KG/M2 | DIASTOLIC BLOOD PRESSURE: 84 MMHG | HEART RATE: 86 BPM | WEIGHT: 220 LBS | OXYGEN SATURATION: 99 % | HEIGHT: 62 IN | SYSTOLIC BLOOD PRESSURE: 132 MMHG

## 2020-06-12 DIAGNOSIS — G43.719 INTRACTABLE CHRONIC MIGRAINE WITHOUT AURA AND WITHOUT STATUS MIGRAINOSUS: Primary | ICD-10-CM

## 2020-06-12 DIAGNOSIS — G43.719 INTRACTABLE CHRONIC MIGRAINE WITHOUT AURA AND WITHOUT STATUS MIGRAINOSUS: ICD-10-CM

## 2020-06-12 DIAGNOSIS — E55.9 VITAMIN D DEFICIENCY: ICD-10-CM

## 2020-06-12 DIAGNOSIS — E03.9 HYPOTHYROIDISM, UNSPECIFIED TYPE: Primary | ICD-10-CM

## 2020-06-12 DIAGNOSIS — I10 ESSENTIAL HYPERTENSION: ICD-10-CM

## 2020-06-12 PROCEDURE — 99214 OFFICE O/P EST MOD 30 MIN: CPT | Performed by: PHYSICIAN ASSISTANT

## 2020-06-12 RX ORDER — NAPROXEN 500 MG/1
500 TABLET ORAL 2 TIMES DAILY PRN
Qty: 30 TABLET | Refills: 5 | Status: SHIPPED | OUTPATIENT
Start: 2020-06-12 | End: 2021-09-27

## 2020-06-12 RX ORDER — LEVETIRACETAM 500 MG/1
TABLET ORAL
Qty: 270 TABLET | Refills: 3 | Status: SHIPPED | OUTPATIENT
Start: 2020-06-12 | End: 2021-08-17

## 2020-06-12 RX ORDER — MEMANTINE HYDROCHLORIDE 5 MG/1
5 TABLET ORAL 2 TIMES DAILY
Qty: 180 TABLET | Refills: 3 | Status: SHIPPED | OUTPATIENT
Start: 2020-06-12 | End: 2021-08-02

## 2020-06-12 RX ORDER — PROCHLORPERAZINE MALEATE 10 MG
10 TABLET ORAL EVERY 6 HOURS PRN
Qty: 60 TABLET | Refills: 2 | Status: SHIPPED | OUTPATIENT
Start: 2020-06-12 | End: 2021-10-26 | Stop reason: SDUPTHER

## 2020-06-12 NOTE — TELEPHONE ENCOUNTER
MERNA FROM [facility] CALLED IN REGARDS TO THE UBRELVY RX THAT WAS SENT OVER TO THEM FROM [facility]. [facility] STATED THERE ISN'T ANY INSTRUCTIONS ON THE MEDICATION. PLEASE ADVISE            CALL BACK - [phone]

## 2020-06-12 NOTE — PROGRESS NOTES
Subjective   Talita English is a 40 y.o. female is here today for follow-up.    Long-term migraine patient with difficult to control migraines.  Patient has failed multiple medication therapies.  Recently her migraines have been increasing in intensity and frequency.    Migraine    This is a chronic problem. The current episode started more than 1 year ago. The problem occurs intermittently. The problem has been gradually worsening. The pain is located in the bilateral, retro-orbital and occipital region. The pain does not radiate. The pain quality is similar to prior headaches. The quality of the pain is described as sharp and aching. The pain is severe. Associated symptoms include nausea, numbness, photophobia, rhinorrhea, scalp tenderness, tingling, a visual change and vomiting. Pertinent negatives include no weakness. The symptoms are aggravated by activity, bright light, fatigue, emotional stress, noise, Valsalva and weather changes. She has tried acetaminophen, antidepressants, beta blockers, Excedrin, darkened room, cold packs, NSAIDs and triptans for the symptoms. The treatment provided moderate relief. Her past medical history is significant for migraine headaches. There is no history of pseudotumor cerebri.        The following portions of the patient's history were reviewed and updated as appropriate: allergies, current medications, past family history, past medical history, past social history, past surgical history and problem list.    Review of Systems   Constitutional: Negative.    HENT: Positive for congestion and rhinorrhea.    Eyes: Positive for photophobia and visual disturbance.   Respiratory: Negative.    Cardiovascular: Negative.    Gastrointestinal: Positive for nausea, vomiting and indigestion.   Endocrine: Negative.    Genitourinary: Negative.    Musculoskeletal: Positive for arthralgias.   Skin: Negative.    Allergic/Immunologic: Negative.    Neurological: Positive for tingling,  numbness and headache. Negative for facial asymmetry, speech difficulty and weakness.   Hematological: Negative.    Psychiatric/Behavioral: Negative.          Current Outpatient Medications:   •  cetirizine (zyrTEC) 10 MG tablet, TAKE 1 TABLET BY MOUTH DAILY, Disp: 30 tablet, Rfl: 0  •  Erenumab-aooe 140 MG/ML solution auto-injector, Inject 1 mL under the skin into the appropriate area as directed Every 30 (Thirty) Days., Disp: 1 pen, Rfl: 3  •  levETIRAcetam (KEPPRA) 500 MG tablet, Take 1 tablet in the morning, take 2 tablets at night., Disp: 270 tablet, Rfl: 3  •  Levonorgest-Eth Estrad 91-Day (AMETHIA) 0.15-0.03 &0.01 MG tablet, Take 1 tablet by mouth Daily., Disp: , Rfl:   •  levothyroxine (SYNTHROID, LEVOTHROID) 50 MCG tablet, Take 1 tablet by mouth Daily., Disp: 90 tablet, Rfl: 3  •  memantine (NAMENDA) 5 MG tablet, Take 1 tablet by mouth 2 (Two) Times a Day., Disp: 180 tablet, Rfl: 3  •  metoprolol tartrate (LOPRESSOR) 25 MG tablet, Take 1 tablet by mouth 2 (Two) Times a Day., Disp: 180 tablet, Rfl: 3  •  naproxen (Naprosyn) 500 MG tablet, Take 1 tablet by mouth 2 (Two) Times a Day As Needed for Moderate Pain  or Headache., Disp: 30 tablet, Rfl: 5  •  prochlorperazine (COMPAZINE) 10 MG tablet, Take 1 tablet by mouth Every 6 (Six) Hours As Needed for Nausea or Vomiting., Disp: 60 tablet, Rfl: 2  •  SUMAtriptan (IMITREX) 50 MG tablet, TAKE 1 TABLET BY MOUTH AT ONSET OF HEADACHE. MAY REPEAT DOSE 1 TIME IN 2 HOURS IF HEADACHE NOT RELIEVED, Disp: 9 tablet, Rfl: 0  •  cyclobenzaprine (FLEXERIL) 10 MG tablet, Take 1 tablet by mouth 3 (Three) Times a Day As Needed for Muscle Spasms., Disp: 90 tablet, Rfl: 1  •  pantoprazole (PROTONIX) 20 MG EC tablet, TAKE 1 TABLET BY MOUTH DAILY, Disp: 90 tablet, Rfl: 0  •  Ubrogepant 100 MG tablet, Take 100 mg by mouth As Needed (migraine). Take 1 tablet at the onset of migraine, may repeat in 1 hour if needed.  Not to exceed 2 doses in 24 hours., Disp: 10 tablet, Rfl: 12      Objective   Physical Exam   Constitutional: She is oriented to person, place, and time. Vital signs are normal.   HENT:   Head: Normocephalic.   Right Ear: Hearing and external ear normal.   Left Ear: Hearing and external ear normal.   Nose: Nose normal.   Mouth/Throat: Uvula is midline, oropharynx is clear and moist and mucous membranes are normal.   Eyes: Pupils are equal, round, and reactive to light. Conjunctivae, EOM and lids are normal. No scleral icterus.   Fundoscopic exam:       The right eye shows no hemorrhage and no papilledema. The right eye shows red reflex.        The left eye shows no hemorrhage and no papilledema. The left eye shows red reflex.   Neck: Trachea normal, normal range of motion and phonation normal. No JVD present. Carotid bruit is not present.   Cardiovascular: Normal rate and normal heart sounds.   Pulmonary/Chest: Effort normal and breath sounds normal.   Neurological: She is alert and oriented to person, place, and time. She has normal strength and normal reflexes. She displays no atrophy and no tremor. No cranial nerve deficit or sensory deficit. She exhibits normal muscle tone. Coordination and gait normal. GCS eye subscore is 4. GCS verbal subscore is 5. GCS motor subscore is 6.   Skin: Skin is warm, dry and intact.   Psychiatric: She has a normal mood and affect. Her speech is normal and behavior is normal. Judgment and thought content normal. Cognition and memory are normal.   Nursing note and vitals reviewed.        Assessment/Plan   Talita was seen today for migraine and headache.    Diagnoses and all orders for this visit:    Intractable chronic migraine without aura and without status migrainosus  -     Ubrogepant 100 MG tablet; Take 100 mg by mouth Take As Directed.  -     levETIRAcetam (KEPPRA) 500 MG tablet; Take 1 tablet in the morning, take 2 tablets at night.  -     memantine (NAMENDA) 5 MG tablet; Take 1 tablet by mouth 2 (Two) Times a Day.  -     Erenumab-aooe  140 MG/ML solution auto-injector; Inject 1 mL under the skin into the appropriate area as directed Every 30 (Thirty) Days.  -     naproxen (Naprosyn) 500 MG tablet; Take 1 tablet by mouth 2 (Two) Times a Day As Needed for Moderate Pain  or Headache.  -     prochlorperazine (COMPAZINE) 10 MG tablet; Take 1 tablet by mouth Every 6 (Six) Hours As Needed for Nausea or Vomiting.    Extended conversation with the patient regarding her ongoing migraine issues.  Medication recommendations as above.  Medication recommendations are reviewed in detail including indications, instructions, expectations and potential side effects.  Ubrogepant is recommended as the patient has been intolerant to Relpax and Maxalt in the past.    Numerous patient questions and concerns are reviewed in detail.      20 minutes of a 25 minute outpatient visit was spent in counseling and coordination of care today.           Dictated utilizing Dragon dictation.

## 2020-06-13 LAB
25(OH)D3+25(OH)D2 SERPL-MCNC: 24.2 NG/ML (ref 30–100)
ALBUMIN SERPL-MCNC: 4 G/DL (ref 3.5–5.2)
ALBUMIN/GLOB SERPL: 1.2 G/DL
ALP SERPL-CCNC: 69 U/L (ref 39–117)
ALT SERPL-CCNC: 11 U/L (ref 1–33)
APPEARANCE UR: CLEAR
AST SERPL-CCNC: 10 U/L (ref 1–32)
BASOPHILS # BLD AUTO: 0.04 10*3/MM3 (ref 0–0.2)
BASOPHILS NFR BLD AUTO: 0.5 % (ref 0–1.5)
BILIRUB SERPL-MCNC: 0.2 MG/DL (ref 0.2–1.2)
BILIRUB UR QL STRIP: NEGATIVE
BUN SERPL-MCNC: 7 MG/DL (ref 6–20)
BUN/CREAT SERPL: 6.7 (ref 7–25)
CALCIUM SERPL-MCNC: 9.9 MG/DL (ref 8.6–10.5)
CHLORIDE SERPL-SCNC: 104 MMOL/L (ref 98–107)
CHOLEST SERPL-MCNC: 210 MG/DL (ref 0–200)
CO2 SERPL-SCNC: 24.7 MMOL/L (ref 22–29)
COLOR UR: YELLOW
CREAT SERPL-MCNC: 1.04 MG/DL (ref 0.57–1)
EOSINOPHIL # BLD AUTO: 0.14 10*3/MM3 (ref 0–0.4)
EOSINOPHIL NFR BLD AUTO: 1.8 % (ref 0.3–6.2)
ERYTHROCYTE [DISTWIDTH] IN BLOOD BY AUTOMATED COUNT: 12.6 % (ref 12.3–15.4)
GLOBULIN SER CALC-MCNC: 3.3 GM/DL
GLUCOSE SERPL-MCNC: 86 MG/DL (ref 65–99)
GLUCOSE UR QL: NEGATIVE
HCT VFR BLD AUTO: 42.2 % (ref 34–46.6)
HCV AB S/CO SERPL IA: <0.1 S/CO RATIO (ref 0–0.9)
HDLC SERPL-MCNC: 49 MG/DL (ref 40–60)
HGB BLD-MCNC: 13.8 G/DL (ref 12–15.9)
HGB UR QL STRIP: NEGATIVE
IMM GRANULOCYTES # BLD AUTO: 0.02 10*3/MM3 (ref 0–0.05)
IMM GRANULOCYTES NFR BLD AUTO: 0.3 % (ref 0–0.5)
KETONES UR QL STRIP: NEGATIVE
LDLC SERPL CALC-MCNC: 138 MG/DL (ref 0–100)
LEUKOCYTE ESTERASE UR QL STRIP: NEGATIVE
LYMPHOCYTES # BLD AUTO: 2.06 10*3/MM3 (ref 0.7–3.1)
LYMPHOCYTES NFR BLD AUTO: 26.1 % (ref 19.6–45.3)
MCH RBC QN AUTO: 28.2 PG (ref 26.6–33)
MCHC RBC AUTO-ENTMCNC: 32.7 G/DL (ref 31.5–35.7)
MCV RBC AUTO: 86.1 FL (ref 79–97)
MONOCYTES # BLD AUTO: 0.37 10*3/MM3 (ref 0.1–0.9)
MONOCYTES NFR BLD AUTO: 4.7 % (ref 5–12)
NEUTROPHILS # BLD AUTO: 5.26 10*3/MM3 (ref 1.7–7)
NEUTROPHILS NFR BLD AUTO: 66.6 % (ref 42.7–76)
NITRITE UR QL STRIP: NEGATIVE
NRBC BLD AUTO-RTO: 0 /100 WBC (ref 0–0.2)
PH UR STRIP: 6.5 [PH] (ref 5–8)
PLATELET # BLD AUTO: 323 10*3/MM3 (ref 140–450)
POTASSIUM SERPL-SCNC: 4.5 MMOL/L (ref 3.5–5.2)
PROT SERPL-MCNC: 7.3 G/DL (ref 6–8.5)
PROT UR QL STRIP: NEGATIVE
RBC # BLD AUTO: 4.9 10*6/MM3 (ref 3.77–5.28)
SODIUM SERPL-SCNC: 139 MMOL/L (ref 136–145)
SP GR UR: 1.01 (ref 1–1.03)
TRIGL SERPL-MCNC: 115 MG/DL (ref 0–150)
TSH SERPL DL<=0.005 MIU/L-ACNC: 2.65 UIU/ML (ref 0.27–4.2)
URATE SERPL-MCNC: 4.8 MG/DL (ref 2.4–5.7)
UROBILINOGEN UR STRIP-MCNC: NORMAL MG/DL
VLDLC SERPL CALC-MCNC: 23 MG/DL
WBC # BLD AUTO: 7.89 10*3/MM3 (ref 3.4–10.8)

## 2020-06-15 ENCOUNTER — RESULTS ENCOUNTER (OUTPATIENT)
Dept: INTERNAL MEDICINE | Facility: CLINIC | Age: 40
End: 2020-06-15

## 2020-06-15 DIAGNOSIS — I10 ESSENTIAL HYPERTENSION: ICD-10-CM

## 2020-06-15 DIAGNOSIS — E03.9 HYPOTHYROIDISM, UNSPECIFIED TYPE: ICD-10-CM

## 2020-06-15 DIAGNOSIS — E55.9 VITAMIN D DEFICIENCY: ICD-10-CM

## 2020-06-15 DIAGNOSIS — Z11.59 NEED FOR HEPATITIS C SCREENING TEST: ICD-10-CM

## 2020-06-19 ENCOUNTER — OFFICE VISIT (OUTPATIENT)
Dept: INTERNAL MEDICINE | Facility: CLINIC | Age: 40
End: 2020-06-19

## 2020-06-19 VITALS
RESPIRATION RATE: 18 BRPM | HEART RATE: 86 BPM | HEIGHT: 62 IN | BODY MASS INDEX: 42.51 KG/M2 | OXYGEN SATURATION: 98 % | DIASTOLIC BLOOD PRESSURE: 82 MMHG | WEIGHT: 231 LBS | TEMPERATURE: 98.7 F | SYSTOLIC BLOOD PRESSURE: 137 MMHG

## 2020-06-19 DIAGNOSIS — R12 HEARTBURN: ICD-10-CM

## 2020-06-19 DIAGNOSIS — R00.2 PALPITATIONS: ICD-10-CM

## 2020-06-19 DIAGNOSIS — Z12.4 PAP SMEAR FOR CERVICAL CANCER SCREENING: Primary | ICD-10-CM

## 2020-06-19 DIAGNOSIS — M12.9 ARTHRITIS, MULTIPLE JOINT INVOLVEMENT: ICD-10-CM

## 2020-06-19 DIAGNOSIS — I10 ESSENTIAL HYPERTENSION: ICD-10-CM

## 2020-06-19 DIAGNOSIS — H57.89 PERIORBITAL SWELLING: ICD-10-CM

## 2020-06-19 DIAGNOSIS — Z12.31 BREAST CANCER SCREENING BY MAMMOGRAM: ICD-10-CM

## 2020-06-19 DIAGNOSIS — M62.838 MUSCLE SPASMS OF NECK: ICD-10-CM

## 2020-06-19 DIAGNOSIS — G43.019 INTRACTABLE MIGRAINE WITHOUT AURA AND WITHOUT STATUS MIGRAINOSUS: ICD-10-CM

## 2020-06-19 DIAGNOSIS — R10.2 COMPLAINT OF PELVIC PAIN: ICD-10-CM

## 2020-06-19 PROCEDURE — 99214 OFFICE O/P EST MOD 30 MIN: CPT | Performed by: NURSE PRACTITIONER

## 2020-06-19 RX ORDER — PANTOPRAZOLE SODIUM 20 MG/1
20 TABLET, DELAYED RELEASE ORAL DAILY
Qty: 30 TABLET | Refills: 0 | Status: SHIPPED | OUTPATIENT
Start: 2020-06-19 | End: 2020-06-22

## 2020-06-19 RX ORDER — CYCLOBENZAPRINE HCL 10 MG
10 TABLET ORAL 3 TIMES DAILY PRN
Qty: 90 TABLET | Refills: 1 | Status: SHIPPED | OUTPATIENT
Start: 2020-06-19 | End: 2020-12-02 | Stop reason: SDUPTHER

## 2020-06-19 NOTE — PATIENT INSTRUCTIONS
Rheumatoid Factor Test  Why am I having this test?  The rheumatoid factor test is used to help diagnose certain autoimmune diseases. Normally, your body makes protective proteins called antibodies (IgM, IgG, and IgA) to help fight off infections. If you have an autoimmune disease, your body may make a collection of antibodies that do not function correctly (autoantibodies). They attack tissues that are wrongly identified as foreign. In some autoimmune diseases, these autoantibodies are known as the rheumatoid factor (RF).  You may have this test if your health care provider suspects that you have an autoimmune disease, such as:  · Rheumatoid arthritis (RA).  · Systemic lupus erythematosus (SLE).  · Sjögren's syndrome.  · Mixed connective tissue disease.  A majority of people who have rheumatoid arthritis have a positive rheumatoid factor. Raised levels of these autoantibodies can also sometimes be a sign of other autoimmune diseases. However, it is also possible for the RF test to be negative even when a disease is present. Likewise, a small number of people may have a positive RF test when an autoimmune disease is not actually present. Other tests may be needed to help make a diagnosis.  What is being tested?  This test checks your blood for the RF autoantibodies.  What kind of sample is taken?    A blood sample is required for this test. It is usually collected by inserting a needle into a blood vessel or by sticking a finger with a small needle.  How are the results reported?  Your test result will be reported as either positive or negative for RF autoantibodies.  What do the results mean?  A negative result means that no RF or only a small amount was found in your blood. This means that it is unlikely that you have an autoimmune disease.  A positive result means that a larger amount of RF autoantibodies was found in your blood. This may indicate that you have RA or another autoimmune disease. Your health care  provider will talk to you about doing more tests to confirm your results.  Talk with your health care provider about what your results mean.  Questions to ask your health care provider  Ask your health care provider, or the department that is doing the test:  · When will my results be ready?  · How will I get my results?  · What are my treatment options?  · What other tests do I need?  · What are my next steps?  Summary  · The rheumatoid factor test is used to help diagnose certain autoimmune diseases.  · In some autoimmune diseases, the body makes autoantibodies known as the rheumatoid factor (RF), which attack tissues that are wrongly identified as foreign.  · A negative result means that no RF or only a small amount was found in your blood.  · A positive result means that a larger amount of RF autoantibodies was found in your blood.  · Talk with your health care provider about what your results mean.  This information is not intended to replace advice given to you by your health care provider. Make sure you discuss any questions you have with your health care provider.  Document Released: 01/20/2006 Document Revised: 11/30/2018 Document Reviewed: 09/26/2018  Elsevier Patient Education © 2020 [employer]

## 2020-06-22 LAB
ANA TITR SER IF: NEGATIVE {TITER}
CRP SERPL-MCNC: 1.88 MG/DL (ref 0–0.5)
RHEUMATOID FACT SERPL-ACNC: <10 IU/ML (ref 0–13.9)

## 2020-06-22 RX ORDER — PANTOPRAZOLE SODIUM 20 MG/1
20 TABLET, DELAYED RELEASE ORAL DAILY
Qty: 90 TABLET | Refills: 0 | Status: SHIPPED | OUTPATIENT
Start: 2020-06-22 | End: 2020-09-16

## 2020-07-23 ENCOUNTER — TELEPHONE (OUTPATIENT)
Dept: NEUROLOGY | Facility: CLINIC | Age: 40
End: 2020-07-23

## 2020-07-23 NOTE — TELEPHONE ENCOUNTER
is in need of a Prior Authorization for her Ubrelvy and her Aimovig. These Authorizations are needing to be completed before 08/20/2020 or her insurance will not cover them. Please advise.

## 2020-07-24 ENCOUNTER — HOSPITAL ENCOUNTER (OUTPATIENT)
Dept: MAMMOGRAPHY | Facility: HOSPITAL | Age: 40
Discharge: HOME | End: 2020-07-24
Admitting: NURSE PRACTITIONER

## 2020-07-24 ENCOUNTER — OFFICE VISIT (OUTPATIENT)
Dept: OBSTETRICS AND GYNECOLOGY | Facility: CLINIC | Age: 40
End: 2020-07-24

## 2020-07-24 VITALS
WEIGHT: 233 LBS | DIASTOLIC BLOOD PRESSURE: 76 MMHG | SYSTOLIC BLOOD PRESSURE: 118 MMHG | HEIGHT: 62 IN | BODY MASS INDEX: 42.88 KG/M2

## 2020-07-24 DIAGNOSIS — Z12.4 CERVICAL CANCER SCREENING: ICD-10-CM

## 2020-07-24 DIAGNOSIS — Z12.39 ENCOUNTER FOR SCREENING FOR MALIGNANT NEOPLASM OF BREAST: ICD-10-CM

## 2020-07-24 DIAGNOSIS — Z01.419 WELL WOMAN EXAM WITH ROUTINE GYNECOLOGICAL EXAM: Primary | ICD-10-CM

## 2020-07-24 DIAGNOSIS — Z12.31 BREAST CANCER SCREENING BY MAMMOGRAM: ICD-10-CM

## 2020-07-24 DIAGNOSIS — Z30.41 ENCOUNTER FOR SURVEILLANCE OF CONTRACEPTIVE PILLS: ICD-10-CM

## 2020-07-24 DIAGNOSIS — G43.829 MENSTRUAL MIGRAINE WITHOUT STATUS MIGRAINOSUS, NOT INTRACTABLE: ICD-10-CM

## 2020-07-24 PROCEDURE — 77067 SCR MAMMO BI INCL CAD: CPT

## 2020-07-24 PROCEDURE — 77063 BREAST TOMOSYNTHESIS BI: CPT

## 2020-07-24 PROCEDURE — 87624 HPV HI-RISK TYP POOLED RSLT: CPT | Performed by: NURSE PRACTITIONER

## 2020-07-24 PROCEDURE — 99213 OFFICE O/P EST LOW 20 MIN: CPT | Performed by: NURSE PRACTITIONER

## 2020-07-24 PROCEDURE — G0123 SCREEN CERV/VAG THIN LAYER: HCPCS | Performed by: NURSE PRACTITIONER

## 2020-07-24 PROCEDURE — 99396 PREV VISIT EST AGE 40-64: CPT | Performed by: NURSE PRACTITIONER

## 2020-07-24 RX ORDER — LEVONORGESTREL / ETHINYL ESTRADIOL AND ETHINYL ESTRADIOL 150-30(84)
1 KIT ORAL DAILY
Qty: 91 EACH | Refills: 3 | Status: SHIPPED | OUTPATIENT
Start: 2020-07-24 | End: 2021-10-18 | Stop reason: SDUPTHER

## 2020-07-24 NOTE — TELEPHONE ENCOUNTER
PA active on file for Aimovig. Due to pt having commerical insurance pt is able to get Ubrelvy with the presenting of a copay card at the pharmacy without a PA complete. I will initiate a PA for Ubrelvy in the meantime.

## 2020-07-24 NOTE — PROGRESS NOTES
Subjective   Talita English is a 40 y.o. female  YOB: 1980        Chief Complaint   Patient presents with   • Gynecologic Exam     New patient here for yearly exam. Last exam was done with her PCP about 3 years ago and was normal.  Patient is on Amethia OCP and typically does well with it. Patient states that sometimes during her placebo pill week she has a period and others she doesn't have her full periods. Patient needing refills of OCP.  Patient c/o of sometimes having pain with IC.        Gynecologic Exam   The patient's pertinent negatives include no pelvic pain. Pertinent negatives include no abdominal pain, back pain, constipation, diarrhea, dysuria, fever, frequency, hematuria, nausea, rash, sore throat, urgency or vomiting.       The following portions of the patient's history were reviewed and updated as appropriate: allergies, current medications, past family history, past medical history, past social history, past surgical history and problem list.    Allergies   Allergen Reactions   • Codeine Nausea And Vomiting   • Elavil [Amitriptyline Hcl] Rash   • Maxalt [Rizatriptan Benzoate] Nausea Only   • Relpax [Eletriptan] Nausea Only       Past Medical History:   Diagnosis Date   • Disease of thyroid gland     hypothyroid   • Fibromyalgia    • GERD (gastroesophageal reflux disease)    • Hypertension    • Migraines        Family History   Problem Relation Age of Onset   • Hypertension Mother    • Melanoma Mother    • Cancer Father         bladder   • Heart attack Father    • Heart disease Father    • Arrhythmia Father    • Abnormal EKG Father    • Colon cancer Paternal Uncle    • Migraines Brother    • Hypertension Brother    • Heart attack Sister    • Hypertension Sister    • Heart disease Maternal Grandmother    • Heart attack Maternal Grandfather    • Heart attack Paternal Grandmother    • Aneurysm Paternal Grandfather    • Colon polyps Neg Hx    • Seizures Neg Hx    • Stroke Neg Hx     • Breast cancer Neg Hx    • Ovarian cancer Neg Hx    • Uterine cancer Neg Hx        Social History     Socioeconomic History   • Marital status:      Spouse name: Not on file   • Number of children: Not on file   • Years of education: Not on file   • Highest education level: Not on file   Tobacco Use   • Smoking status: Never Smoker   • Smokeless tobacco: Never Used   Substance and Sexual Activity   • Alcohol use: Yes     Comment: rarely   • Drug use: No   • Sexual activity: Defer     Birth control/protection: OCP         Current Outpatient Medications:   •  cetirizine (zyrTEC) 10 MG tablet, TAKE 1 TABLET BY MOUTH DAILY, Disp: 30 tablet, Rfl: 0  •  cyclobenzaprine (FLEXERIL) 10 MG tablet, Take 1 tablet by mouth 3 (Three) Times a Day As Needed for Muscle Spasms., Disp: 90 tablet, Rfl: 1  •  Erenumab-aooe 140 MG/ML solution auto-injector, Inject 1 mL under the skin into the appropriate area as directed Every 30 (Thirty) Days., Disp: 1 pen, Rfl: 3  •  levETIRAcetam (KEPPRA) 500 MG tablet, Take 1 tablet in the morning, take 2 tablets at night., Disp: 270 tablet, Rfl: 3  •  Levonorgest-Eth Estrad 91-Day (AMETHIA) 0.15-0.03 &0.01 MG tablet, Take 1 tablet by mouth Daily., Disp: , Rfl:   •  levothyroxine (SYNTHROID, LEVOTHROID) 50 MCG tablet, Take 1 tablet by mouth Daily., Disp: 90 tablet, Rfl: 3  •  memantine (NAMENDA) 5 MG tablet, Take 1 tablet by mouth 2 (Two) Times a Day., Disp: 180 tablet, Rfl: 3  •  metoprolol tartrate (LOPRESSOR) 25 MG tablet, Take 1 tablet by mouth 2 (Two) Times a Day., Disp: 180 tablet, Rfl: 3  •  naproxen (Naprosyn) 500 MG tablet, Take 1 tablet by mouth 2 (Two) Times a Day As Needed for Moderate Pain  or Headache., Disp: 30 tablet, Rfl: 5  •  pantoprazole (PROTONIX) 20 MG EC tablet, TAKE 1 TABLET BY MOUTH DAILY, Disp: 90 tablet, Rfl: 0  •  prochlorperazine (COMPAZINE) 10 MG tablet, Take 1 tablet by mouth Every 6 (Six) Hours As Needed for Nausea or Vomiting., Disp: 60 tablet, Rfl: 2  •   SUMAtriptan (IMITREX) 50 MG tablet, TAKE 1 TABLET BY MOUTH AT ONSET OF HEADACHE. MAY REPEAT DOSE 1 TIME IN 2 HOURS IF HEADACHE NOT RELIEVED, Disp: 9 tablet, Rfl: 0  •  Ubrogepant 100 MG tablet, Take 100 mg by mouth As Needed (migraine). Take 1 tablet at the onset of migraine, may repeat in 1 hour if needed.  Not to exceed 2 doses in 24 hours., Disp: 10 tablet, Rfl: 12    No LMP recorded. (Menstrual status: Oral contraceptives).    Sexual History:         Could not be calculated    Past Surgical History:   Procedure Laterality Date   •  SECTION     • ENDOSCOPY N/A 2019    Normal 1st and 2nd portions of the duodenum-biopsied; Normal stomach; LA grade A esophagitis; No esophageal abnormality to explain patient's dysphagia-esophagus dilated        Review of Systems   Constitutional: Negative for activity change, appetite change, fatigue, fever, unexpected weight gain and unexpected weight loss.   HENT: Negative for congestion, ear pain, hearing loss, nosebleeds, rhinorrhea, sore throat, tinnitus and trouble swallowing.    Eyes: Negative for blurred vision, pain, discharge, itching and visual disturbance.   Respiratory: Negative for apnea, chest tightness, shortness of breath and wheezing.    Cardiovascular: Negative for chest pain and leg swelling.   Gastrointestinal: Negative for abdominal pain, blood in stool, constipation, diarrhea, nausea, vomiting and GERD.   Endocrine: Negative for heat intolerance, polydipsia and polyuria.   Genitourinary: Negative for breast lump, decreased libido, difficulty urinating, dyspareunia, dysuria, frequency, genital sores, hematuria, menstrual problem, pelvic pain, urgency, urinary incontinence and vaginal pain.   Musculoskeletal: Negative for arthralgias, back pain, joint swelling and myalgias.   Skin: Negative for color change, rash and skin lesions.   Allergic/Immunologic: Negative for environmental allergies, food allergies and immunocompromised state.   Neurological:  Negative for dizziness, tremors, seizures, syncope, facial asymmetry, numbness and headache.   Hematological: Negative for adenopathy. Does not bruise/bleed easily.   Psychiatric/Behavioral: Negative for agitation, hallucinations, sleep disturbance, suicidal ideas and depressed mood. The patient is not nervous/anxious.        Objective   Physical Exam   Constitutional: She is oriented to person, place, and time. She appears well-developed and well-nourished. No distress.   HENT:   Head: Normocephalic.   Right Ear: External ear normal.   Left Ear: External ear normal.   Nose: Nose normal.   Mouth/Throat: Oropharynx is clear and moist.   Eyes: Conjunctivae are normal. Right eye exhibits no discharge. Left eye exhibits no discharge. No scleral icterus.   Neck: Normal range of motion. Neck supple. Carotid bruit is not present. No tracheal deviation present. No thyromegaly present.   Cardiovascular: Normal rate, regular rhythm, normal heart sounds and intact distal pulses.   No murmur heard.  Pulmonary/Chest: Effort normal and breath sounds normal. No respiratory distress. She has no wheezes. Right breast exhibits no inverted nipple, no mass, no nipple discharge, no skin change and no tenderness. Left breast exhibits no inverted nipple, no mass, no nipple discharge, no skin change and no tenderness. No breast swelling, tenderness, discharge or bleeding. Breasts are symmetrical.   Abdominal: Soft. She exhibits no distension and no mass. There is no tenderness. There is no guarding. No hernia. Hernia confirmed negative in the right inguinal area and confirmed negative in the left inguinal area.   Genitourinary: Rectum normal, vagina normal and uterus normal. Rectal exam shows no mass. No breast swelling, tenderness, discharge or bleeding. Pelvic exam was performed with patient supine. There is no rash, tenderness, lesion or injury on the right labia. There is no rash, tenderness, lesion or injury on the left labia. Uterus  "is not enlarged, not fixed and not tender. Cervix exhibits no motion tenderness, no discharge and no friability. Right adnexum displays no mass, no tenderness and no fullness. Left adnexum displays no mass, no tenderness and no fullness. No erythema, tenderness or bleeding in the vagina. No foreign body in the vagina. No signs of injury around the vagina. No vaginal discharge found.   Genitourinary Comments:   BSU normal  Urethral meatus  Normal  Perineum  Normal   Musculoskeletal: Normal range of motion. She exhibits no edema or tenderness.   Lymphadenopathy:        Head (right side): No submental, no submandibular, no tonsillar, no preauricular, no posterior auricular and no occipital adenopathy present.        Head (left side): No submental, no submandibular, no tonsillar, no preauricular, no posterior auricular and no occipital adenopathy present.     She has no cervical adenopathy.        Right cervical: No superficial cervical, no deep cervical and no posterior cervical adenopathy present.       Left cervical: No superficial cervical, no deep cervical and no posterior cervical adenopathy present.     She has no axillary adenopathy.        Right: No inguinal adenopathy present.        Left: No inguinal adenopathy present.   Neurological: She is alert and oriented to person, place, and time. Coordination normal.   Skin: Skin is warm and dry. No bruising and no rash noted. She is not diaphoretic. No erythema.   Psychiatric: She has a normal mood and affect. Her behavior is normal. Judgment and thought content normal.   Nursing note and vitals reviewed.        Vitals:    07/24/20 0946   BP: 118/76   Weight: 106 kg (233 lb)   Height: 157.5 cm (62\")       Talita was seen today for gynecologic exam.    Diagnoses and all orders for this visit:    Well woman exam with routine gynecological exam  Comments:  Normal well woman exam.  ThinPrep Pap smear done.  Mammogram ordered by PCP and to be done today.    Cervical " cancer screening  Comments:  ThinPrep Pap smear done.    Encounter for screening for malignant neoplasm of breast  Comments:  Mammogram ordered by PCP and to be done today.    Menstrual migraine without status migrainosus, not intractable  Comments:  Patient is on Vera for both birth control and menstrual migraines.  Doing well on it.  Refill sent to pharmacy.    Encounter for surveillance of contraceptive pills          Patient's Body mass index is 42.62 kg/m². BMI is above normal parameters. Recommendations include: exercise counseling and nutrition counseling.             Non-Smoker    MyChart Instructions Given

## 2020-07-29 LAB
GEN CATEG CVX/VAG CYTO-IMP: NORMAL
HPV I/H RISK 4 DNA CVX QL PROBE+SIG AMP: NOT DETECTED
LAB AP CASE REPORT: NORMAL
LAB AP GYN ADDITIONAL INFORMATION: NORMAL
PATH INTERP SPEC-IMP: NORMAL
STAT OF ADQ CVX/VAG CYTO-IMP: NORMAL

## 2020-09-13 DIAGNOSIS — R12 HEARTBURN: ICD-10-CM

## 2020-09-14 DIAGNOSIS — G43.719 INTRACTABLE CHRONIC MIGRAINE WITHOUT AURA AND WITHOUT STATUS MIGRAINOSUS: ICD-10-CM

## 2020-09-14 RX ORDER — SUMATRIPTAN 50 MG/1
TABLET, FILM COATED ORAL
Qty: 9 TABLET | Refills: 0 | Status: SHIPPED | OUTPATIENT
Start: 2020-09-14 | End: 2020-10-19 | Stop reason: SDUPTHER

## 2020-09-14 NOTE — TELEPHONE ENCOUNTER
PT IS REQUESTING REFILL ON IMITREX AS COPAY CARD WON'T WORK FOR SECOND UBRELVY REFILL AND IT COSTS $1000 PER MONTH. SHE SAID UBRELVY DIDN'T WORK WELL FOR HER

## 2020-09-16 RX ORDER — PANTOPRAZOLE SODIUM 20 MG/1
20 TABLET, DELAYED RELEASE ORAL DAILY
Qty: 90 TABLET | Refills: 0 | Status: SHIPPED | OUTPATIENT
Start: 2020-09-16 | End: 2020-11-03 | Stop reason: SDUPTHER

## 2020-09-16 NOTE — TELEPHONE ENCOUNTER
Can you look it pt's chart and tell me when she is due back  In?   I looked in the last ofc note and didn't see anything.     Thank you

## 2020-09-25 DIAGNOSIS — N30.20 CYSTITIS, CHRONIC: ICD-10-CM

## 2020-09-25 RX ORDER — CETIRIZINE HYDROCHLORIDE 10 MG/1
10 TABLET ORAL DAILY
Qty: 30 TABLET | Refills: 5 | Status: CANCELLED | OUTPATIENT
Start: 2020-09-25

## 2020-10-07 ENCOUNTER — TELEPHONE (OUTPATIENT)
Dept: INTERNAL MEDICINE | Facility: CLINIC | Age: 40
End: 2020-10-07

## 2020-10-07 DIAGNOSIS — N30.20 CYSTITIS, CHRONIC: ICD-10-CM

## 2020-10-07 RX ORDER — CETIRIZINE HYDROCHLORIDE 10 MG/1
10 TABLET ORAL DAILY
Qty: 30 TABLET | Refills: 0 | Status: SHIPPED | OUTPATIENT
Start: 2020-10-07 | End: 2020-11-03 | Stop reason: SDUPTHER

## 2020-10-07 NOTE — TELEPHONE ENCOUNTER
It had already been sent to the pharmacy,however, I just discontinued this pending order and sent new prescription.

## 2020-10-07 NOTE — TELEPHONE ENCOUNTER
Pt called in requesting refill:    Cetitizine 10 MG    Please send to [facility] in [place], [place]    [phone].

## 2020-10-19 DIAGNOSIS — G43.719 INTRACTABLE CHRONIC MIGRAINE WITHOUT AURA AND WITHOUT STATUS MIGRAINOSUS: ICD-10-CM

## 2020-10-19 RX ORDER — SUMATRIPTAN 50 MG/1
TABLET, FILM COATED ORAL
Qty: 9 TABLET | Refills: 0 | Status: SHIPPED | OUTPATIENT
Start: 2020-10-19 | End: 2021-01-25

## 2020-11-03 ENCOUNTER — OFFICE VISIT (OUTPATIENT)
Dept: INTERNAL MEDICINE | Facility: CLINIC | Age: 40
End: 2020-11-03

## 2020-11-03 VITALS
BODY MASS INDEX: 43.79 KG/M2 | WEIGHT: 238 LBS | TEMPERATURE: 98.4 F | HEART RATE: 103 BPM | SYSTOLIC BLOOD PRESSURE: 126 MMHG | HEIGHT: 62 IN | OXYGEN SATURATION: 97 % | DIASTOLIC BLOOD PRESSURE: 86 MMHG

## 2020-11-03 DIAGNOSIS — R12 HEARTBURN: ICD-10-CM

## 2020-11-03 DIAGNOSIS — Z23 NEED FOR INFLUENZA VACCINATION: ICD-10-CM

## 2020-11-03 DIAGNOSIS — N30.20 CYSTITIS, CHRONIC: ICD-10-CM

## 2020-11-03 DIAGNOSIS — E55.9 VITAMIN D DEFICIENCY: Primary | ICD-10-CM

## 2020-11-03 DIAGNOSIS — F43.21 SITUATIONAL DEPRESSION: ICD-10-CM

## 2020-11-03 PROCEDURE — 90686 IIV4 VACC NO PRSV 0.5 ML IM: CPT | Performed by: NURSE PRACTITIONER

## 2020-11-03 PROCEDURE — 90471 IMMUNIZATION ADMIN: CPT | Performed by: NURSE PRACTITIONER

## 2020-11-03 PROCEDURE — 99213 OFFICE O/P EST LOW 20 MIN: CPT | Performed by: NURSE PRACTITIONER

## 2020-11-03 RX ORDER — ERGOCALCIFEROL 1.25 MG/1
50000 CAPSULE ORAL WEEKLY
Qty: 5 CAPSULE | Refills: 3 | Status: SHIPPED | OUTPATIENT
Start: 2020-11-03 | End: 2021-06-01

## 2020-11-03 RX ORDER — MELOXICAM 7.5 MG/1
7.5 TABLET ORAL DAILY
COMMUNITY
End: 2021-10-26

## 2020-11-03 RX ORDER — CETIRIZINE HYDROCHLORIDE 10 MG/1
10 TABLET ORAL DAILY
Qty: 90 TABLET | Refills: 3 | Status: SHIPPED | OUTPATIENT
Start: 2020-11-03 | End: 2022-03-29

## 2020-11-03 RX ORDER — PANTOPRAZOLE SODIUM 20 MG/1
20 TABLET, DELAYED RELEASE ORAL DAILY
Qty: 90 TABLET | Refills: 1 | Status: SHIPPED | OUTPATIENT
Start: 2020-11-03 | End: 2021-10-26

## 2020-11-03 NOTE — PROGRESS NOTES
Subjective   Talita English is a 40 y.o. female.   Chief Complaint   Patient presents with   • Follow-up     No complaints today.   • Med Refill     Requesting to have Protonix and Zyrtec refilled.        Mrs. English is a 40 you female who present for follow up after establishing care with rheumatologist. They had done a extensive autoimmune work up and determine her symptoms are most likely a combination of arthritis, allergic reaction to environmental factors (unknown), and fibromyalgia. Patient reports that she is very frustrated about her bizarre symptoms and normal lab findings. She reports that she has been having some situational depression related to symptoms, losing her father and losing her job this year. She is requesting counselor recommendations today. She denies suicidal thoughts or planning and reports she is would like to hold off taking medicine for her depression.     She does still have acid reflux symptoms, but reports with the Protonix it has improved significantly and she rarely has an issue, unless she forgets to take the therapy. She denies changes in bowel movement and denies painful bowel movements. She has been adjusting  Her diet and working on weight loss with calorie reduction and slowly increasing exercise.        The following portions of the patient's history were reviewed and updated as appropriate: allergies, current medications, past family history, past medical history, past social history, past surgical history and problem list.    Review of Systems   Constitutional: Negative for activity change, appetite change, fatigue, fever, unexpected weight gain and unexpected weight loss.   HENT: Negative for swollen glands, trouble swallowing and voice change.    Eyes: Negative for blurred vision and visual disturbance.   Respiratory: Negative for cough and shortness of breath.    Cardiovascular: Negative for chest pain, palpitations and leg swelling.   Gastrointestinal: Positive for  GERD. Negative for abdominal pain, constipation, diarrhea, nausea, vomiting and indigestion.   Endocrine: Negative for cold intolerance, heat intolerance, polydipsia and polyphagia.   Genitourinary: Negative for dysuria and frequency.   Musculoskeletal: Positive for arthralgias. Negative for back pain, joint swelling and neck pain.   Skin: Negative for color change, rash and skin lesions.   Neurological: Negative for dizziness, weakness, headache, memory problem and confusion.   Hematological: Does not bruise/bleed easily.   Psychiatric/Behavioral: Positive for depressed mood. Negative for agitation, hallucinations and suicidal ideas. The patient is not nervous/anxious.        Objective   Past Medical History:   Diagnosis Date   • Disease of thyroid gland     hypothyroid   • Fibromyalgia    • GERD (gastroesophageal reflux disease)    • Hypertension    • Migraines       Past Surgical History:   Procedure Laterality Date   •  SECTION     • ENDOSCOPY N/A 2019    Normal 1st and 2nd portions of the duodenum-biopsied; Normal stomach; LA grade A esophagitis; No esophageal abnormality to explain patient's dysphagia-esophagus dilated         Current Outpatient Medications:   •  cetirizine (zyrTEC) 10 MG tablet, Take 1 tablet by mouth Daily., Disp: 90 tablet, Rfl: 3  •  cyclobenzaprine (FLEXERIL) 10 MG tablet, Take 1 tablet by mouth 3 (Three) Times a Day As Needed for Muscle Spasms., Disp: 90 tablet, Rfl: 1  •  Erenumab-aooe 140 MG/ML solution auto-injector, Inject 1 mL under the skin into the appropriate area as directed Every 30 (Thirty) Days., Disp: 1 pen, Rfl: 3  •  levETIRAcetam (KEPPRA) 500 MG tablet, Take 1 tablet in the morning, take 2 tablets at night., Disp: 270 tablet, Rfl: 3  •  Levonorgest-Eth Estrad -Day (Amethia) 0.15-0.03 &0.01 MG tablet, Take 1 tablet by mouth Daily., Disp: 91 each, Rfl: 3  •  levothyroxine (SYNTHROID, LEVOTHROID) 50 MCG tablet, Take 1 tablet by mouth Daily., Disp: 90 tablet,  Rfl: 3  •  meloxicam (MOBIC) 7.5 MG tablet, Take 7.5 mg by mouth Daily., Disp: , Rfl:   •  memantine (NAMENDA) 5 MG tablet, Take 1 tablet by mouth 2 (Two) Times a Day., Disp: 180 tablet, Rfl: 3  •  metoprolol tartrate (LOPRESSOR) 25 MG tablet, Take 1 tablet by mouth 2 (Two) Times a Day., Disp: 180 tablet, Rfl: 3  •  naproxen (Naprosyn) 500 MG tablet, Take 1 tablet by mouth 2 (Two) Times a Day As Needed for Moderate Pain  or Headache., Disp: 30 tablet, Rfl: 5  •  pantoprazole (PROTONIX) 20 MG EC tablet, Take 1 tablet by mouth Daily., Disp: 90 tablet, Rfl: 1  •  prochlorperazine (COMPAZINE) 10 MG tablet, Take 1 tablet by mouth Every 6 (Six) Hours As Needed for Nausea or Vomiting., Disp: 60 tablet, Rfl: 2  •  SUMAtriptan (IMITREX) 50 MG tablet, TAKE 1 TABLET BY MOUTH AT ONSET OF HEADACHE. MAY REPEAT DOSE 1 TIME IN 2 HOURS IF HEADACHE NOT RELIEVED, Disp: 9 tablet, Rfl: 0  •  vitamin D (ERGOCALCIFEROL) 1.25 MG (47722 UT) capsule capsule, Take 1 capsule by mouth 1 (One) Time Per Week., Disp: 5 capsule, Rfl: 3     Vitals:    11/03/20 0956   BP: 126/86   Pulse: 103   Temp: 98.4 °F (36.9 °C)   SpO2: 97%         11/03/20  0956   Weight: 108 kg (238 lb)     Patient's Body mass index is 43.53 kg/m². BMI is above normal parameters. Recommendations include: educational material, exercise counseling and nutrition counseling.      Physical Exam  Vitals signs and nursing note reviewed.   Constitutional:       Appearance: Normal appearance. She is well-developed and well-groomed. She is morbidly obese.   HENT:      Head: Normocephalic and atraumatic.      Right Ear: Hearing, tympanic membrane, ear canal and external ear normal.      Left Ear: Hearing, tympanic membrane, ear canal and external ear normal.      Nose: Nose normal.      Mouth/Throat:      Lips: Pink.      Mouth: Mucous membranes are moist.      Pharynx: Oropharynx is clear. Uvula midline.   Eyes:      General: Lids are normal. Lids are everted, no foreign bodies  appreciated. Vision grossly intact.      Extraocular Movements: Extraocular movements intact.      Conjunctiva/sclera: Conjunctivae normal.      Pupils: Pupils are equal, round, and reactive to light.   Neck:      Musculoskeletal: Normal range of motion and neck supple.      Thyroid: No thyromegaly.   Cardiovascular:      Rate and Rhythm: Normal rate and regular rhythm.      Pulses: Normal pulses.      Heart sounds: Normal heart sounds.   Pulmonary:      Effort: Pulmonary effort is normal.      Breath sounds: Normal breath sounds.   Abdominal:      General: Abdomen is protuberant. Bowel sounds are normal.      Palpations: Abdomen is soft.      Tenderness: There is no abdominal tenderness.   Musculoskeletal:      Lumbar back: She exhibits pain and spasm. She exhibits normal range of motion, no bony tenderness, no swelling and no edema.      Right lower leg: No edema.      Left lower leg: No edema.   Lymphadenopathy:      Cervical: No cervical adenopathy.   Skin:     General: Skin is warm and dry.   Neurological:      Mental Status: She is alert and oriented to person, place, and time.   Psychiatric:         Behavior: Behavior normal. Behavior is cooperative.         Thought Content: Thought content normal.           Assessment/Plan   Diagnoses and all orders for this visit:    1. Vitamin D deficiency (Primary)  -     Vitamin D 25 hydroxy; Future  -     vitamin D (ERGOCALCIFEROL) 1.25 MG (54529 UT) capsule capsule; Take 1 capsule by mouth 1 (One) Time Per Week.  Dispense: 5 capsule; Refill: 3    2. Heartburn  -     pantoprazole (PROTONIX) 20 MG EC tablet; Take 1 tablet by mouth Daily.  Dispense: 90 tablet; Refill: 1    3. Cystitis, chronic  -     cetirizine (zyrTEC) 10 MG tablet; Take 1 tablet by mouth Daily.  Dispense: 90 tablet; Refill: 3    4. Situational depression        Patient will continue to see rheumatologist to discuss symptoms progression. She is having some depression with losing her father, losing her  job, and having years of intermittent bizzare symptoms. She would like list of psychologist to call and discuss feelings. He does not want medicine at this time. She denies suicidal thoughts. She will continue current medicine for heartburn. She had tried numerous other methods to help with acid reflux without improvement. She was discharged with reflux guidelines. Patient will start taking vitamin D, was only taking OTC supplement for last few years. Will have her discontinue this and start 50,000 units weekly. Recheck in 3 months and follow up in 3 months.

## 2020-11-03 NOTE — PATIENT INSTRUCTIONS
Food Choices for Gastroesophageal Reflux Disease, Adult  When you have gastroesophageal reflux disease (GERD), the foods you eat and your eating habits are very important. Choosing the right foods can help ease your discomfort. Think about working with a nutrition specialist (dietitian) to help you make good choices.  What are tips for following this plan?    Meals  · Choose healthy foods that are low in fat, such as fruits, vegetables, whole grains, low-fat dairy products, and lean meat, fish, and poultry.  · Eat small meals often instead of 3 large meals a day. Eat your meals slowly, and in a place where you are relaxed. Avoid bending over or lying down until 2-3 hours after eating.  · Avoid eating meals 2-3 hours before bed.  · Avoid drinking a lot of liquid with meals.  · Cook foods using methods other than frying. Bake, grill, or broil food instead.  · Avoid or limit:  ? Chocolate.  ? Peppermint or spearmint.  ? Alcohol.  ? Pepper.  ? Black and decaffeinated coffee.  ? Black and decaffeinated tea.  ? Bubbly (carbonated) soft drinks.  ? Caffeinated energy drinks and soft drinks.  · Limit high-fat foods such as:  ? Fatty meat or fried foods.  ? Whole milk, cream, butter, or ice cream.  ? Nuts and nut butters.  ? Pastries, donuts, and sweets made with butter or shortening.  · Avoid foods that cause symptoms. These foods may be different for everyone. Common foods that cause symptoms include:  ? Tomatoes.  ? Oranges, tyler, and limes.  ? Peppers.  ? Spicy food.  ? Onions and garlic.  ? Vinegar.  Lifestyle  · Maintain a healthy weight. Ask your doctor what weight is healthy for you. If you need to lose weight, work with your doctor to do so safely.  · Exercise for at least 30 minutes for 5 or more days each week, or as told by your doctor.  · Wear loose-fitting clothes.  · Do not smoke. If you need help quitting, ask your doctor.  · Sleep with the head of your bed higher than your feet. Use a wedge under the  mattress or blocks under the bed frame to raise the head of the bed.  Summary  · When you have gastroesophageal reflux disease (GERD), food and lifestyle choices are very important in easing your symptoms.  · Eat small meals often instead of 3 large meals a day. Eat your meals slowly, and in a place where you are relaxed.  · Limit high-fat foods such as fatty meat or fried foods.  · Avoid bending over or lying down until 2-3 hours after eating.  · Avoid peppermint and spearmint, caffeine, alcohol, and chocolate.  This information is not intended to replace advice given to you by your health care provider. Make sure you discuss any questions you have with your health care provider.  Document Released: 06/18/2013 Document Revised: 04/09/2020 Document Reviewed: 01/23/2018  Elsevier Patient Education © 2020 [employer]  Vitamin D Deficiency  Vitamin D deficiency is when your body does not have enough vitamin D. Vitamin D is important to your body because:  · It helps your body use other minerals.  · It helps to keep your bones strong and healthy.  · It may help to prevent some diseases.  · It helps your heart and other muscles work well.  Not getting enough vitamin D can make your bones soft. It can also cause other health problems.  What are the causes?  This condition may be caused by:  · Not eating enough foods that contain vitamin D.  · Not getting enough sun.  · Having diseases that make it hard for your body to absorb vitamin D.  · Having a surgery in which a part of the stomach or a part of the small intestine is removed.  · Having kidney disease or liver disease.  What increases the risk?  You are more likely to get this condition if:  · You are older.  · You do not spend much time outdoors.  · You live in a nursing home.  · You have had broken bones.  · You have weak or thin bones (osteoporosis).  · You have a disease or condition that changes how your body absorbs vitamin D.  · You have dark skin.  · You  take certain medicines.  · You are overweight or obese.  What are the signs or symptoms?  · In mild cases, there may not be any symptoms. If the condition is very bad, symptoms may include:  ? Bone pain.  ? Muscle pain.  ? Falling often.  ? Broken bones caused by a minor injury.  How is this treated?  Treatment may include taking supplements as told by your doctor. Your doctor will tell you what dose is best for you. Supplements may include:  · Vitamin D.  · Calcium.  Follow these instructions at home:  Eating and drinking    · Eat foods that contain vitamin D, such as:  ? Dairy products, cereals, or juices with added vitamin D. Check the label.  ? Fish, such as salmon or trout.  ? Eggs.  ? Oysters.  ? Mushrooms.  The items listed above may not be a complete list of what you can eat and drink. Contact a dietitian for more options.  General instructions  · Take medicines and supplements only as told by your doctor.  · Get regular, safe exposure to natural sunlight.  · Do not use a tanning bed.  · Maintain a healthy weight. Lose weight if needed.  · Keep all follow-up visits as told by your doctor. This is important.  How is this prevented?  · You can get vitamin D by:  ? Eating foods that naturally contain vitamin D.  ? Eating or drinking products that have vitamin D added to them, such as cereals, juices, and milk.  ? Taking vitamin D or a multivitamin that contains vitamin D.  ? Being in the sun. Your body makes vitamin D when your skin is exposed to sunlight. Your body changes the sunlight into a form of the vitamin that it can use.  Contact a doctor if:  · Your symptoms do not go away.  · You feel sick to your stomach (nauseous).  · You throw up (vomit).  · You poop less often than normal, or you have trouble pooping (constipation).  Summary  · Vitamin D deficiency is when your body does not have enough vitamin D.  · Vitamin D helps to keep your bones strong and healthy.  · This condition is often treated by  taking a supplement.  · Your doctor will tell you what dose is best for you.  This information is not intended to replace advice given to you by your health care provider. Make sure you discuss any questions you have with your health care provider.  Document Released: 12/06/2012 Document Revised: 08/26/2019 Document Reviewed: 08/26/2019  Elsevier Patient Education © 2020 Elsevier Inc.

## 2020-12-02 DIAGNOSIS — M62.838 MUSCLE SPASMS OF NECK: ICD-10-CM

## 2020-12-02 RX ORDER — CYCLOBENZAPRINE HCL 10 MG
10 TABLET ORAL 3 TIMES DAILY PRN
Qty: 90 TABLET | Refills: 1 | Status: SHIPPED | OUTPATIENT
Start: 2020-12-02 | End: 2021-06-28

## 2020-12-28 DIAGNOSIS — G43.719 INTRACTABLE CHRONIC MIGRAINE WITHOUT AURA AND WITHOUT STATUS MIGRAINOSUS: ICD-10-CM

## 2020-12-28 RX ORDER — ERENUMAB-AOOE 140 MG/ML
INJECTION, SOLUTION SUBCUTANEOUS
Qty: 1 PEN | Refills: 2 | Status: SHIPPED | OUTPATIENT
Start: 2020-12-28 | End: 2021-10-26 | Stop reason: SDUPTHER

## 2020-12-30 ENCOUNTER — TELEPHONE (OUTPATIENT)
Dept: INTERNAL MEDICINE | Facility: CLINIC | Age: 40
End: 2020-12-30

## 2021-01-24 DIAGNOSIS — G43.719 INTRACTABLE CHRONIC MIGRAINE WITHOUT AURA AND WITHOUT STATUS MIGRAINOSUS: ICD-10-CM

## 2021-01-25 RX ORDER — SUMATRIPTAN 50 MG/1
TABLET, FILM COATED ORAL
Qty: 9 TABLET | Refills: 0 | Status: SHIPPED | OUTPATIENT
Start: 2021-01-25 | End: 2021-06-01

## 2021-01-25 RX ORDER — LEVOTHYROXINE SODIUM 0.05 MG/1
50 TABLET ORAL DAILY
Qty: 90 TABLET | Refills: 3 | Status: SHIPPED | OUTPATIENT
Start: 2021-01-25 | End: 2021-10-26

## 2021-03-19 DIAGNOSIS — I10 ESSENTIAL HYPERTENSION: ICD-10-CM

## 2021-03-19 NOTE — TELEPHONE ENCOUNTER
Caller: Talita English    Relationship: Self    Best call back number: [phone]     Medication needed:   Requested Prescriptions     Pending Prescriptions Disp Refills   • metoprolol tartrate (LOPRESSOR) 25 MG tablet 180 tablet 3     Sig: Take 1 tablet by mouth 2 (Two) Times a Day.         Does the patient have less than a 3 day supply:  [] Yes  [x] No    What is the patient's preferred pharmacy: [id number] [address] [phone] [facility] [phone]

## 2021-03-30 ENCOUNTER — TELEPHONE (OUTPATIENT)
Dept: NEUROLOGY | Facility: CLINIC | Age: 41
End: 2021-03-30

## 2021-03-30 NOTE — TELEPHONE ENCOUNTER
Caller: Talita English    Relationship to patient: Self    Best call back number: [phone]    Chief complaint: NA    Type of visit: F/U    Requested date: 4-1-21     If rescheduling, when is the original appointment: NA     Additional notes:PATIENT WANTS TO KNOW IF SHE CAN CHANGE TO TELEHEALTH VISIT. PLEASE CALL IF CHANGED OR IF NEEDS TO BE RESCHEDULED. VOICEMAIL IS FINE TO LEAVE PER THE PATIENT. PLEASE ADVISE.

## 2021-03-30 NOTE — TELEPHONE ENCOUNTER
Hub staff attempted to follow warm transfer process and was unsuccessful     Caller: Talita English    Relationship to patient: Self    Best call back number: [phone]    Patient is needing: RETURNING JEFF'S CALL, PHONE JUST RANG, NO ANSWER. PLEASE RETURN WHEN AVAILABLE. THANK YOU.

## 2021-04-30 ENCOUNTER — TELEPHONE (OUTPATIENT)
Dept: NEUROLOGY | Facility: CLINIC | Age: 41
End: 2021-04-30

## 2021-05-30 DIAGNOSIS — G43.719 INTRACTABLE CHRONIC MIGRAINE WITHOUT AURA AND WITHOUT STATUS MIGRAINOSUS: ICD-10-CM

## 2021-06-01 ENCOUNTER — OFFICE VISIT (OUTPATIENT)
Dept: INTERNAL MEDICINE | Facility: CLINIC | Age: 41
End: 2021-06-01

## 2021-06-01 VITALS
TEMPERATURE: 97.3 F | HEART RATE: 82 BPM | DIASTOLIC BLOOD PRESSURE: 80 MMHG | SYSTOLIC BLOOD PRESSURE: 130 MMHG | RESPIRATION RATE: 16 BRPM | HEIGHT: 62 IN | OXYGEN SATURATION: 99 % | BODY MASS INDEX: 40.12 KG/M2 | WEIGHT: 218 LBS

## 2021-06-01 DIAGNOSIS — I10 ESSENTIAL HYPERTENSION: Primary | ICD-10-CM

## 2021-06-01 DIAGNOSIS — K30 PERSISTENT INDIGESTION: ICD-10-CM

## 2021-06-01 PROCEDURE — 99213 OFFICE O/P EST LOW 20 MIN: CPT | Performed by: INTERNAL MEDICINE

## 2021-06-01 RX ORDER — SUMATRIPTAN 50 MG/1
TABLET, FILM COATED ORAL
Qty: 9 TABLET | Refills: 0 | Status: SHIPPED | OUTPATIENT
Start: 2021-06-01 | End: 2021-08-17

## 2021-06-01 NOTE — PROGRESS NOTES
Subjective   Talita English is a 41 y.o. female.   Chief Complaint   Patient presents with   • Heartburn     Follow-up   • Vitamin D Deficiency       History of Present Illness patient is here to follow-up on heartburn indigestion that she had been experiencing.  She is been seeing a dietitian working through diets apparently at this point they are eliminating grains and that has helped tremendously.  Eliminating gluten only did not completely take care of her problem.    The following portions of the patient's history were reviewed and updated as appropriate: allergies, current medications, past family history, past medical history, past social history, past surgical history and problem list.    Review of Systems   Constitutional: Negative for activity change, appetite change, fatigue, fever, unexpected weight gain and unexpected weight loss.   HENT: Negative for swollen glands, trouble swallowing and voice change.    Eyes: Negative for blurred vision and visual disturbance.   Respiratory: Negative for cough and shortness of breath.    Cardiovascular: Negative for chest pain, palpitations and leg swelling.   Gastrointestinal: Positive for indigestion. Negative for abdominal pain, constipation, diarrhea, nausea and vomiting.   Endocrine: Negative for cold intolerance, heat intolerance, polydipsia and polyphagia.   Genitourinary: Negative for dysuria and frequency.   Musculoskeletal: Negative for arthralgias, back pain, joint swelling and neck pain.   Skin: Negative for color change, rash and skin lesions.   Neurological: Negative for dizziness, weakness, headache, memory problem and confusion.   Hematological: Does not bruise/bleed easily.   Psychiatric/Behavioral: Negative for agitation, hallucinations and suicidal ideas. The patient is not nervous/anxious.        Objective   Past Medical History:   Diagnosis Date   • Disease of thyroid gland     hypothyroid   • Fibromyalgia    • GERD (gastroesophageal reflux  disease)    • Hypertension    • Migraines       Past Surgical History:   Procedure Laterality Date   •  SECTION     • ENDOSCOPY N/A 2019    Normal 1st and 2nd portions of the duodenum-biopsied; Normal stomach; LA grade A esophagitis; No esophageal abnormality to explain patient's dysphagia-esophagus dilated         Current Outpatient Medications:   •  Aimovig 140 MG/ML prefilled syringe, INJECT 1ML UNDER THE SKIN INTO THE APPROPARIATE AREA AS DIRECTED EVERY 30 DAYS, Disp: 1 pen, Rfl: 2  •  cetirizine (zyrTEC) 10 MG tablet, Take 1 tablet by mouth Daily., Disp: 90 tablet, Rfl: 3  •  cyclobenzaprine (FLEXERIL) 10 MG tablet, Take 1 tablet by mouth 3 (Three) Times a Day As Needed for Muscle Spasms., Disp: 90 tablet, Rfl: 1  •  levETIRAcetam (KEPPRA) 500 MG tablet, Take 1 tablet in the morning, take 2 tablets at night., Disp: 270 tablet, Rfl: 3  •  Levonorgest-Eth Estrad -Day (Amethia) 0.15-0.03 &0.01 MG tablet, Take 1 tablet by mouth Daily., Disp: 91 each, Rfl: 3  •  levothyroxine (SYNTHROID, LEVOTHROID) 50 MCG tablet, TAKE 1 TABLET BY MOUTH DAILY, Disp: 90 tablet, Rfl: 3  •  meloxicam (MOBIC) 7.5 MG tablet, Take 7.5 mg by mouth Daily. Takes 1 tablet weekly., Disp: , Rfl:   •  memantine (NAMENDA) 5 MG tablet, Take 1 tablet by mouth 2 (Two) Times a Day., Disp: 180 tablet, Rfl: 3  •  metoprolol tartrate (LOPRESSOR) 25 MG tablet, Take 1 tablet by mouth 2 (Two) Times a Day., Disp: 180 tablet, Rfl: 3  •  naproxen (Naprosyn) 500 MG tablet, Take 1 tablet by mouth 2 (Two) Times a Day As Needed for Moderate Pain  or Headache., Disp: 30 tablet, Rfl: 5  •  pantoprazole (PROTONIX) 20 MG EC tablet, Take 1 tablet by mouth Daily., Disp: 90 tablet, Rfl: 1  •  prochlorperazine (COMPAZINE) 10 MG tablet, Take 1 tablet by mouth Every 6 (Six) Hours As Needed for Nausea or Vomiting., Disp: 60 tablet, Rfl: 2  •  SUMAtriptan (IMITREX) 50 MG tablet, TAKE 1 TABLET BY MOUTH AT ONSET OF HEADACHE. MAY REPEAT DOSE 1 TIME IN 2 HOURS IF  HEADACHE NOT RELIEVED, Disp: 9 tablet, Rfl: 0     Vitals:    06/01/21 1346   BP: 130/80   Pulse: 82   Resp: 16   Temp: 97.3 °F (36.3 °C)   SpO2: 99%         06/01/21  1346   Weight: 98.9 kg (218 lb)         Physical Exam  Constitutional:       Appearance: Normal appearance. She is well-developed.   HENT:      Head: Normocephalic and atraumatic.      Right Ear: External ear normal.      Left Ear: External ear normal.   Eyes:      Extraocular Movements: Extraocular movements intact.      Conjunctiva/sclera: Conjunctivae normal.      Pupils: Pupils are equal, round, and reactive to light.   Neck:      Vascular: No carotid bruit.   Cardiovascular:      Rate and Rhythm: Normal rate and regular rhythm.      Pulses: Normal pulses.      Heart sounds: Normal heart sounds. No murmur heard.   No gallop.    Pulmonary:      Effort: Pulmonary effort is normal.      Breath sounds: Normal breath sounds.   Musculoskeletal:         General: No swelling or tenderness. Normal range of motion.      Cervical back: Normal range of motion and neck supple. No rigidity.   Skin:     General: Skin is warm and dry.   Neurological:      General: No focal deficit present.      Mental Status: She is alert and oriented to person, place, and time.   Psychiatric:         Mood and Affect: Mood normal.         Behavior: Behavior normal.               Assessment/Plan   Diagnoses and all orders for this visit:    1. Essential hypertension (Primary)  -     Basic Metabolic Panel    2. Persistent indigestion    At this point patient's blood pressure is well controlled her indigestion is better with dietary removal of grains from her diet.  I am making no changes I encouraged her to continue her dietary search for a holistic solution to her current problem.  I agree with her and avoiding medications if possible.

## 2021-06-02 LAB
BUN SERPL-MCNC: 8 MG/DL (ref 6–20)
BUN/CREAT SERPL: 10.8 (ref 7–25)
CALCIUM SERPL-MCNC: 9.9 MG/DL (ref 8.6–10.5)
CHLORIDE SERPL-SCNC: 103 MMOL/L (ref 98–107)
CO2 SERPL-SCNC: 27.2 MMOL/L (ref 22–29)
CREAT SERPL-MCNC: 0.74 MG/DL (ref 0.57–1)
GLUCOSE SERPL-MCNC: 95 MG/DL (ref 65–99)
POTASSIUM SERPL-SCNC: 4 MMOL/L (ref 3.5–5.2)
SODIUM SERPL-SCNC: 140 MMOL/L (ref 136–145)

## 2021-06-03 ENCOUNTER — TELEPHONE (OUTPATIENT)
Dept: INTERNAL MEDICINE | Facility: CLINIC | Age: 41
End: 2021-06-03

## 2021-06-27 DIAGNOSIS — M62.838 MUSCLE SPASMS OF NECK: ICD-10-CM

## 2021-06-28 RX ORDER — CYCLOBENZAPRINE HCL 10 MG
TABLET ORAL
Qty: 90 TABLET | Refills: 1 | Status: SHIPPED | OUTPATIENT
Start: 2021-06-28 | End: 2021-11-15

## 2021-07-07 ENCOUNTER — TELEPHONE (OUTPATIENT)
Dept: NEUROLOGY | Facility: CLINIC | Age: 41
End: 2021-07-07

## 2021-07-07 NOTE — TELEPHONE ENCOUNTER
Caller: Talita English    Relationship: Self    Best call back number: [phone]    What was the call regarding: PT STATES SHE SPOKE WITH LOUIE LAST WEEK REGARDING STOPPING BY THE OFFICE ONE DAY THIS WEEK TO  [employer] SAMPLES (SET ASIDE IN [place] FOR PT).. PT STATES THAT HER WORK SCHEDULE HAS BEEN A LITTLE HECTIC THIS WEEK AND WON'T BE ABLE TO PICK THEM UP. PT STATES HER MOTHER AND SISTER WILL BE IN THE [facility] ON Tuesday, 7/13/21- WOULD LIKE TO KNOW IF THEY COULD  THE SAMPLES ON HER BEHALF?    PT'S MOTHER'S NAME IS ALE MONTAÑO.  PT'S SISTER'S NAME IS MICHAEL BETH.      PT WOULD LIKE TO KNOW IF THIS IS OKAY.    Do you require a callback: YES, PLEASE.    PLEASE REVIEW AND ADVISE.

## 2021-07-13 NOTE — TELEPHONE ENCOUNTER
ANY CONFIRMATION THAT FAMILY MEMBERS PICKED UP [employer] SAMPLES FOR PT?    ATTEMPTED TO CONTACT PT TO CONFIRM BUT WAS UNABLE TO SPEAK WITH HER; I DID LVM.    THANKS IN ADVANCE!

## 2021-08-01 DIAGNOSIS — G43.719 INTRACTABLE CHRONIC MIGRAINE WITHOUT AURA AND WITHOUT STATUS MIGRAINOSUS: ICD-10-CM

## 2021-08-02 RX ORDER — MEMANTINE HYDROCHLORIDE 5 MG/1
TABLET ORAL
Qty: 180 TABLET | Refills: 0 | Status: SHIPPED | OUTPATIENT
Start: 2021-08-02 | End: 2021-10-26 | Stop reason: SDUPTHER

## 2021-08-16 DIAGNOSIS — G43.719 INTRACTABLE CHRONIC MIGRAINE WITHOUT AURA AND WITHOUT STATUS MIGRAINOSUS: ICD-10-CM

## 2021-08-17 RX ORDER — LEVETIRACETAM 500 MG/1
TABLET ORAL
Qty: 270 TABLET | Refills: 0 | Status: SHIPPED | OUTPATIENT
Start: 2021-08-17 | End: 2021-10-26 | Stop reason: SDUPTHER

## 2021-08-17 RX ORDER — SUMATRIPTAN 50 MG/1
TABLET, FILM COATED ORAL
Qty: 9 TABLET | Refills: 0 | Status: SHIPPED | OUTPATIENT
Start: 2021-08-17 | End: 2021-09-27

## 2021-09-26 DIAGNOSIS — G43.719 INTRACTABLE CHRONIC MIGRAINE WITHOUT AURA AND WITHOUT STATUS MIGRAINOSUS: ICD-10-CM

## 2021-09-27 RX ORDER — SUMATRIPTAN 50 MG/1
TABLET, FILM COATED ORAL
Qty: 9 TABLET | Refills: 0 | Status: SHIPPED | OUTPATIENT
Start: 2021-09-27 | End: 2022-02-14

## 2021-09-27 RX ORDER — NAPROXEN 500 MG/1
TABLET ORAL
Qty: 30 TABLET | Refills: 0 | Status: SHIPPED | OUTPATIENT
Start: 2021-09-27 | End: 2021-12-14

## 2021-10-02 DIAGNOSIS — G43.719 INTRACTABLE CHRONIC MIGRAINE WITHOUT AURA AND WITHOUT STATUS MIGRAINOSUS: ICD-10-CM

## 2021-10-04 RX ORDER — NAPROXEN 500 MG/1
TABLET ORAL
Qty: 30 TABLET | Refills: 0 | OUTPATIENT
Start: 2021-10-04

## 2021-10-04 RX ORDER — MEMANTINE HYDROCHLORIDE 5 MG/1
TABLET ORAL
Qty: 180 TABLET | Refills: 0 | OUTPATIENT
Start: 2021-10-04

## 2021-10-12 DIAGNOSIS — Z30.41 ENCOUNTER FOR SURVEILLANCE OF CONTRACEPTIVE PILLS: ICD-10-CM

## 2021-10-12 DIAGNOSIS — G43.829 MENSTRUAL MIGRAINE WITHOUT STATUS MIGRAINOSUS, NOT INTRACTABLE: ICD-10-CM

## 2021-10-13 RX ORDER — LEVONORGESTREL AND ETHINYL ESTRADIOL AND ETHINYL ESTRADIOL 150-30(84)
KIT ORAL
OUTPATIENT
Start: 2021-10-13

## 2021-10-14 DIAGNOSIS — G43.719 INTRACTABLE CHRONIC MIGRAINE WITHOUT AURA AND WITHOUT STATUS MIGRAINOSUS: ICD-10-CM

## 2021-10-14 NOTE — TELEPHONE ENCOUNTER
[facility] said Keppra was filled on 10-13, she received a 30 day supply.  Explained that Talita has an appointment at the end of this month and it has been over a year since her last appointment.

## 2021-10-15 RX ORDER — LEVETIRACETAM 500 MG/1
TABLET ORAL
Qty: 270 TABLET | Refills: 0 | OUTPATIENT
Start: 2021-10-15

## 2021-10-18 ENCOUNTER — TELEPHONE (OUTPATIENT)
Dept: OBSTETRICS AND GYNECOLOGY | Facility: CLINIC | Age: 41
End: 2021-10-18

## 2021-10-18 ENCOUNTER — TELEPHONE (OUTPATIENT)
Dept: INTERNAL MEDICINE | Facility: CLINIC | Age: 41
End: 2021-10-18

## 2021-10-18 DIAGNOSIS — G43.829 MENSTRUAL MIGRAINE WITHOUT STATUS MIGRAINOSUS, NOT INTRACTABLE: ICD-10-CM

## 2021-10-18 DIAGNOSIS — Z30.41 ENCOUNTER FOR SURVEILLANCE OF CONTRACEPTIVE PILLS: ICD-10-CM

## 2021-10-18 RX ORDER — LEVONORGESTREL / ETHINYL ESTRADIOL AND ETHINYL ESTRADIOL 150-30(84)
1 KIT ORAL DAILY
Qty: 91 EACH | Refills: 0 | Status: SHIPPED | OUTPATIENT
Start: 2021-10-18 | End: 2021-12-06 | Stop reason: SDUPTHER

## 2021-10-18 NOTE — TELEPHONE ENCOUNTER
Pt would like an extension on her birth control pills to get her through to her next appt on 12/6/21. She uses the [facility] in [place], [place] on [address]

## 2021-10-26 ENCOUNTER — OFFICE VISIT (OUTPATIENT)
Dept: NEUROLOGY | Facility: CLINIC | Age: 41
End: 2021-10-26

## 2021-10-26 VITALS
WEIGHT: 211 LBS | DIASTOLIC BLOOD PRESSURE: 78 MMHG | OXYGEN SATURATION: 99 % | HEART RATE: 80 BPM | BODY MASS INDEX: 38.83 KG/M2 | SYSTOLIC BLOOD PRESSURE: 132 MMHG | HEIGHT: 62 IN

## 2021-10-26 DIAGNOSIS — G43.719 INTRACTABLE CHRONIC MIGRAINE WITHOUT AURA AND WITHOUT STATUS MIGRAINOSUS: Primary | ICD-10-CM

## 2021-10-26 PROCEDURE — 99214 OFFICE O/P EST MOD 30 MIN: CPT | Performed by: PHYSICIAN ASSISTANT

## 2021-10-26 RX ORDER — LEVETIRACETAM 500 MG/1
1500 TABLET ORAL DAILY
Qty: 90 TABLET | Refills: 3 | Status: SHIPPED | OUTPATIENT
Start: 2021-10-26 | End: 2022-03-14

## 2021-10-26 RX ORDER — PROCHLORPERAZINE MALEATE 10 MG
10 TABLET ORAL EVERY 6 HOURS PRN
Qty: 60 TABLET | Refills: 3 | Status: SHIPPED | OUTPATIENT
Start: 2021-10-26 | End: 2022-03-29 | Stop reason: SDUPTHER

## 2021-10-26 RX ORDER — MEMANTINE HYDROCHLORIDE 10 MG/1
10 TABLET ORAL 2 TIMES DAILY
Qty: 60 TABLET | Refills: 3 | Status: SHIPPED | OUTPATIENT
Start: 2021-10-26 | End: 2022-03-07

## 2021-10-26 RX ORDER — LEVOTHYROXINE SODIUM 0.03 MG/1
25 TABLET ORAL DAILY
COMMUNITY
End: 2022-01-03 | Stop reason: DRUGHIGH

## 2021-10-26 RX ORDER — ERENUMAB-AOOE 140 MG/ML
140 INJECTION, SOLUTION SUBCUTANEOUS
Qty: 1 PEN | Refills: 12 | Status: SHIPPED | OUTPATIENT
Start: 2021-10-26 | End: 2022-03-29 | Stop reason: SDUPTHER

## 2021-11-14 DIAGNOSIS — M62.838 MUSCLE SPASMS OF NECK: ICD-10-CM

## 2021-11-15 RX ORDER — CYCLOBENZAPRINE HCL 10 MG
10 TABLET ORAL 3 TIMES DAILY PRN
Qty: 90 TABLET | Refills: 1 | Status: SHIPPED | OUTPATIENT
Start: 2021-11-15 | End: 2022-04-18

## 2021-11-20 NOTE — PROGRESS NOTES
Subjective   Talita English is a 41 y.o. female is here today for follow-up.    Long-term migraine patient with difficult to control migraines.  Patient has failed multiple medication therapies.  Recently her migraines have been increasing in intensity and frequency.  There has been difficulty obtaining prescribed medication.    Migraine   This is a chronic problem. The current episode started more than 1 year ago. The problem occurs intermittently. The problem has been gradually worsening. The pain is located in the bilateral, retro-orbital and occipital region. The pain does not radiate. The pain quality is similar to prior headaches. The quality of the pain is described as sharp and aching. The pain is severe. Associated symptoms include nausea, numbness, photophobia, rhinorrhea, scalp tenderness, tingling, a visual change and vomiting. Pertinent negatives include no weakness. The symptoms are aggravated by activity, bright light, fatigue, emotional stress, noise, Valsalva and weather changes. She has tried acetaminophen, antidepressants, beta blockers, Excedrin, darkened room, cold packs, NSAIDs and triptans for the symptoms. The treatment provided moderate relief. Her past medical history is significant for migraine headaches. There is no history of pseudotumor cerebri.   Headache   Associated symptoms include nausea, numbness, photophobia, rhinorrhea, scalp tenderness, tingling, a visual change and vomiting. Pertinent negatives include no weakness. Her past medical history is significant for migraine headaches. There is no history of pseudotumor cerebri.        The following portions of the patient's history were reviewed and updated as appropriate: allergies, current medications, past family history, past medical history, past social history, past surgical history and problem list.    Review of Systems   Constitutional: Negative.    HENT: Positive for congestion and rhinorrhea.    Eyes: Positive for  photophobia and visual disturbance.   Respiratory: Negative.    Cardiovascular: Negative.    Gastrointestinal: Positive for nausea, vomiting and indigestion.   Endocrine: Negative.    Genitourinary: Negative.    Musculoskeletal: Positive for arthralgias.   Skin: Negative.    Allergic/Immunologic: Negative.    Neurological: Positive for tingling, numbness and headache. Negative for facial asymmetry, speech difficulty and weakness.   Hematological: Negative.    Psychiatric/Behavioral: Negative.          Current Outpatient Medications:   •  cetirizine (zyrTEC) 10 MG tablet, Take 1 tablet by mouth Daily., Disp: 90 tablet, Rfl: 3  •  Erenumab-aooe (Aimovig) 140 MG/ML prefilled syringe, Inject 1 mL under the skin into the appropriate area as directed Every 30 (Thirty) Days., Disp: 1 pen, Rfl: 12  •  levETIRAcetam (KEPPRA) 500 MG tablet, Take 3 tablets by mouth Daily. TAKE 1 TABLET BY MOUTH EVERY MORNING AND 2 TABLETS AT NIGHT, Disp: 90 tablet, Rfl: 3  •  Levonorgest-Eth Estrad 91-Day (Amethia) 0.15-0.03 &0.01 MG tablet, Take 1 tablet by mouth Daily., Disp: 91 each, Rfl: 0  •  levothyroxine (SYNTHROID, LEVOTHROID) 25 MCG tablet, Take 25 mcg by mouth Daily., Disp: , Rfl:   •  memantine (NAMENDA) 10 MG tablet, Take 1 tablet by mouth 2 (Two) Times a Day., Disp: 60 tablet, Rfl: 3  •  metoprolol tartrate (LOPRESSOR) 25 MG tablet, Take 1 tablet by mouth 2 (Two) Times a Day., Disp: 180 tablet, Rfl: 3  •  naproxen (NAPROSYN) 500 MG tablet, TAKE 1 TABLET BY MOUTH TWICE DAILY AS NEEDED FOR MODERATE PAIN OR HEADACHE (Patient taking differently: Take 500 mg by mouth 2 (Two) Times a Day As Needed for Moderate Pain .), Disp: 30 tablet, Rfl: 0  •  prochlorperazine (COMPAZINE) 10 MG tablet, Take 1 tablet by mouth Every 6 (Six) Hours As Needed for Nausea or Vomiting., Disp: 60 tablet, Rfl: 3  •  SUMAtriptan (IMITREX) 50 MG tablet, TAKE 1 TABLET BY MOUTH AT ONSET OF HEADACHE. MAY REPEAT DOSE 1 TIME IN 2 HOURS IF HEADACHE NOT RELIEVED  (Patient taking differently: Take 50 mg by mouth 1 (One) Time As Needed. TAKE 1 TABLET BY MOUTH AT ONSET OF HEADACHE. MAY REPEAT DOSE 1 TIME IN 2 HOURS IF HEADACHE NOT RELIEVED), Disp: 9 tablet, Rfl: 0  •  cyclobenzaprine (FLEXERIL) 10 MG tablet, Take 1 tablet by mouth 3 (Three) Times a Day As Needed for Muscle Spasms., Disp: 90 tablet, Rfl: 1  •  Rimegepant Sulfate (NURTEC) 75 MG tablet dispersible tablet, Take 1 tablet by mouth Take As Directed. TAKE ONE TABLET EVERY OTHER DAY, AS DIRECTED., Disp: 16 tablet, Rfl: 11     Objective   Physical Exam  Vitals and nursing note reviewed.   HENT:      Head: Normocephalic.      Right Ear: Hearing and external ear normal.      Left Ear: Hearing and external ear normal.      Nose: Nose normal.      Mouth/Throat:      Pharynx: Oropharynx is clear.   Eyes:      General: Lids are normal. Vision grossly intact. Gaze aligned appropriately. No scleral icterus.     Extraocular Movements: Extraocular movements intact.      Conjunctiva/sclera: Conjunctivae normal.      Pupils: Pupils are equal, round, and reactive to light.      Visual Fields: Right eye visual fields normal and left eye visual fields normal.   Neck:      Vascular: No carotid bruit or JVD.      Trachea: Trachea and phonation normal.   Cardiovascular:      Rate and Rhythm: Normal rate.      Heart sounds: Normal heart sounds.   Pulmonary:      Effort: Pulmonary effort is normal.      Breath sounds: Normal breath sounds.   Musculoskeletal:      Cervical back: Normal range of motion.   Skin:     General: Skin is warm and dry.   Neurological:      Mental Status: She is alert and oriented to person, place, and time.      GCS: GCS eye subscore is 4. GCS verbal subscore is 5. GCS motor subscore is 6.      Cranial Nerves: Cranial nerves are intact.      Sensory: Sensation is intact.      Motor: Motor function is intact.      Coordination: Coordination is intact.      Gait: Gait is intact.      Deep Tendon Reflexes: Reflexes are  normal and symmetric.   Psychiatric:         Attention and Perception: Attention and perception normal.         Mood and Affect: Mood and affect normal.         Speech: Speech normal.         Behavior: Behavior normal.         Thought Content: Thought content normal.         Cognition and Memory: Cognition and memory normal.         Judgment: Judgment normal.           Assessment/Plan   Diagnoses and all orders for this visit:    1. Intractable chronic migraine without aura and without status migrainosus (Primary)  -     Erenumab-aooe (Aimovig) 140 MG/ML prefilled syringe; Inject 1 mL under the skin into the appropriate area as directed Every 30 (Thirty) Days.  Dispense: 1 pen; Refill: 12  -     levETIRAcetam (KEPPRA) 500 MG tablet; Take 3 tablets by mouth Daily. TAKE 1 TABLET BY MOUTH EVERY MORNING AND 2 TABLETS AT NIGHT  Dispense: 90 tablet; Refill: 3  -     memantine (NAMENDA) 10 MG tablet; Take 1 tablet by mouth 2 (Two) Times a Day.  Dispense: 60 tablet; Refill: 3  -     prochlorperazine (COMPAZINE) 10 MG tablet; Take 1 tablet by mouth Every 6 (Six) Hours As Needed for Nausea or Vomiting.  Dispense: 60 tablet; Refill: 3  -     Rimegepant Sulfate (NURTEC) 75 MG tablet dispersible tablet; Take 1 tablet by mouth Take As Directed. TAKE ONE TABLET EVERY OTHER DAY, AS DIRECTED.  Dispense: 16 tablet; Refill: 11                     Dictated utilizing Dragon dictation.

## 2021-12-06 ENCOUNTER — OFFICE VISIT (OUTPATIENT)
Dept: OBSTETRICS AND GYNECOLOGY | Facility: CLINIC | Age: 41
End: 2021-12-06

## 2021-12-06 ENCOUNTER — LAB (OUTPATIENT)
Dept: INTERNAL MEDICINE | Facility: CLINIC | Age: 41
End: 2021-12-06

## 2021-12-06 VITALS
WEIGHT: 217 LBS | BODY MASS INDEX: 39.93 KG/M2 | DIASTOLIC BLOOD PRESSURE: 72 MMHG | HEIGHT: 62 IN | SYSTOLIC BLOOD PRESSURE: 126 MMHG

## 2021-12-06 DIAGNOSIS — E55.9 VITAMIN D DEFICIENCY: ICD-10-CM

## 2021-12-06 DIAGNOSIS — Z00.00 WELLNESS EXAMINATION: ICD-10-CM

## 2021-12-06 DIAGNOSIS — E03.9 HYPOTHYROIDISM, UNSPECIFIED TYPE: ICD-10-CM

## 2021-12-06 DIAGNOSIS — I10 ESSENTIAL HYPERTENSION: Primary | ICD-10-CM

## 2021-12-06 DIAGNOSIS — Z01.419 WELL WOMAN EXAM WITH ROUTINE GYNECOLOGICAL EXAM: Primary | ICD-10-CM

## 2021-12-06 DIAGNOSIS — G43.829 MENSTRUAL MIGRAINE WITHOUT STATUS MIGRAINOSUS, NOT INTRACTABLE: ICD-10-CM

## 2021-12-06 DIAGNOSIS — Z12.31 ENCOUNTER FOR SCREENING MAMMOGRAM FOR MALIGNANT NEOPLASM OF BREAST: ICD-10-CM

## 2021-12-06 DIAGNOSIS — Z12.4 CERVICAL CANCER SCREENING: ICD-10-CM

## 2021-12-06 PROCEDURE — G0123 SCREEN CERV/VAG THIN LAYER: HCPCS | Performed by: NURSE PRACTITIONER

## 2021-12-06 PROCEDURE — 99212 OFFICE O/P EST SF 10 MIN: CPT | Performed by: NURSE PRACTITIONER

## 2021-12-06 PROCEDURE — 99396 PREV VISIT EST AGE 40-64: CPT | Performed by: NURSE PRACTITIONER

## 2021-12-06 PROCEDURE — 87624 HPV HI-RISK TYP POOLED RSLT: CPT | Performed by: NURSE PRACTITIONER

## 2021-12-06 RX ORDER — LEVONORGESTREL / ETHINYL ESTRADIOL AND ETHINYL ESTRADIOL 150-30(84)
1 KIT ORAL DAILY
Qty: 91 EACH | Refills: 0 | Status: SHIPPED | OUTPATIENT
Start: 2021-12-06 | End: 2022-04-18

## 2021-12-06 NOTE — PROGRESS NOTES
Subjective   Talita English is a 41 y.o. female  YOB: 1980        Chief Complaint   Patient presents with   • Gynecologic Exam     pt here for annual GYN exam. Last pap 7/24/2020, normal. Last mamm 7/24/2020, normal. pt voices no complaints today.        Gynecologic Exam  The patient's pertinent negatives include no pelvic pain. Pertinent negatives include no abdominal pain, back pain, constipation, diarrhea, dysuria, fever, frequency, hematuria, nausea, rash, sore throat, urgency or vomiting.       The following portions of the patient's history were reviewed and updated as appropriate: allergies, current medications, past family history, past medical history, past social history, past surgical history and problem list.    Allergies   Allergen Reactions   • Codeine Nausea And Vomiting   • Elavil [Amitriptyline Hcl] Rash   • Maxalt [Rizatriptan Benzoate] Nausea Only   • Relpax [Eletriptan] Nausea Only       Past Medical History:   Diagnosis Date   • Disease of thyroid gland     hypothyroid   • Fibromyalgia    • GERD (gastroesophageal reflux disease)    • Hypertension    • Migraines        Family History   Problem Relation Age of Onset   • Hypertension Mother    • Melanoma Mother    • Cancer Father         bladder   • Heart attack Father    • Heart disease Father    • Arrhythmia Father    • Abnormal EKG Father    • Colon cancer Paternal Uncle    • Migraines Brother    • Hypertension Brother    • Heart attack Sister    • Hypertension Sister    • Heart disease Maternal Grandmother    • Heart attack Maternal Grandfather    • Heart attack Paternal Grandmother    • Aneurysm Paternal Grandfather    • Colon polyps Neg Hx    • Seizures Neg Hx    • Stroke Neg Hx    • Breast cancer Neg Hx    • Ovarian cancer Neg Hx    • Uterine cancer Neg Hx        Social History     Socioeconomic History   • Marital status:    Tobacco Use   • Smoking status: Never Smoker   • Smokeless tobacco: Never Used    Substance and Sexual Activity   • Alcohol use: Yes     Comment: rarely   • Drug use: No   • Sexual activity: Defer     Birth control/protection: OCP         Current Outpatient Medications:   •  cetirizine (zyrTEC) 10 MG tablet, Take 1 tablet by mouth Daily., Disp: 90 tablet, Rfl: 3  •  cyclobenzaprine (FLEXERIL) 10 MG tablet, Take 1 tablet by mouth 3 (Three) Times a Day As Needed for Muscle Spasms., Disp: 90 tablet, Rfl: 1  •  Erenumab-aooe (Aimovig) 140 MG/ML prefilled syringe, Inject 1 mL under the skin into the appropriate area as directed Every 30 (Thirty) Days., Disp: 1 pen, Rfl: 12  •  levETIRAcetam (KEPPRA) 500 MG tablet, Take 3 tablets by mouth Daily. TAKE 1 TABLET BY MOUTH EVERY MORNING AND 2 TABLETS AT NIGHT, Disp: 90 tablet, Rfl: 3  •  Levonorgest-Eth Estrad 91-Day (Amethia) 0.15-0.03 &0.01 MG tablet, Take 1 tablet by mouth Daily., Disp: 91 each, Rfl: 0  •  levothyroxine (SYNTHROID, LEVOTHROID) 25 MCG tablet, Take 25 mcg by mouth Daily., Disp: , Rfl:   •  memantine (NAMENDA) 10 MG tablet, Take 1 tablet by mouth 2 (Two) Times a Day., Disp: 60 tablet, Rfl: 3  •  metoprolol tartrate (LOPRESSOR) 25 MG tablet, Take 1 tablet by mouth 2 (Two) Times a Day., Disp: 180 tablet, Rfl: 3  •  prochlorperazine (COMPAZINE) 10 MG tablet, Take 1 tablet by mouth Every 6 (Six) Hours As Needed for Nausea or Vomiting., Disp: 60 tablet, Rfl: 3  •  Rimegepant Sulfate (NURTEC) 75 MG tablet dispersible tablet, Take 1 tablet by mouth Take As Directed. TAKE ONE TABLET EVERY OTHER DAY, AS DIRECTED., Disp: 16 tablet, Rfl: 11  •  SUMAtriptan (IMITREX) 50 MG tablet, TAKE 1 TABLET BY MOUTH AT ONSET OF HEADACHE. MAY REPEAT DOSE 1 TIME IN 2 HOURS IF HEADACHE NOT RELIEVED (Patient taking differently: Take 50 mg by mouth 1 (One) Time As Needed. TAKE 1 TABLET BY MOUTH AT ONSET OF HEADACHE. MAY REPEAT DOSE 1 TIME IN 2 HOURS IF HEADACHE NOT RELIEVED), Disp: 9 tablet, Rfl: 0  •  naproxen (NAPROSYN) 500 MG tablet, TAKE 1 TABLET BY MOUTH TWICE  DAILY AS NEEDED FOR MODERATE PAIN OR HEADACHE, Disp: 30 tablet, Rfl: 1    Patient's last menstrual period was 11/15/2021 (exact date).    Sexual History:           Could not be calculated    Past Surgical History:   Procedure Laterality Date   •  SECTION     • ENDOSCOPY N/A 2019    Normal 1st and 2nd portions of the duodenum-biopsied; Normal stomach; LA grade A esophagitis; No esophageal abnormality to explain patient's dysphagia-esophagus dilated        Review of Systems   Constitutional: Negative for activity change, appetite change, fatigue, fever, unexpected weight gain and unexpected weight loss.   HENT: Negative for congestion, ear pain, hearing loss, nosebleeds, rhinorrhea, sore throat, tinnitus and trouble swallowing.    Eyes: Negative for blurred vision, pain, discharge, itching and visual disturbance.   Respiratory: Negative for apnea, chest tightness, shortness of breath and wheezing.    Cardiovascular: Negative for chest pain and leg swelling.   Gastrointestinal: Negative for abdominal pain, blood in stool, constipation, diarrhea, nausea, vomiting and GERD.   Endocrine: Negative for heat intolerance, polydipsia and polyuria.   Genitourinary: Negative for breast lump, decreased libido, difficulty urinating, dyspareunia, dysuria, frequency, genital sores, hematuria, menstrual problem, pelvic pain, urgency, urinary incontinence and vaginal pain.   Musculoskeletal: Negative for arthralgias, back pain, joint swelling and myalgias.   Skin: Negative for color change, rash and skin lesions.   Allergic/Immunologic: Negative for environmental allergies, food allergies and immunocompromised state.   Neurological: Negative for dizziness, tremors, seizures, syncope, facial asymmetry, numbness and headache.   Hematological: Negative for adenopathy. Does not bruise/bleed easily.   Psychiatric/Behavioral: Negative for agitation, hallucinations, sleep disturbance, suicidal ideas and depressed mood. The  patient is not nervous/anxious.        Objective   Physical Exam  Vitals reviewed.   Constitutional:       General: She is not in acute distress.     Appearance: She is well-developed. She is not ill-appearing.   HENT:      Head: Normocephalic.      Right Ear: External ear normal.      Left Ear: External ear normal.      Nose: Nose normal.      Mouth/Throat:      Pharynx: No oropharyngeal exudate.   Eyes:      General: No scleral icterus.        Right eye: No discharge.         Left eye: No discharge.      Conjunctiva/sclera: Conjunctivae normal.      Pupils: Pupils are equal, round, and reactive to light.   Neck:      Thyroid: No thyroid mass or thyromegaly.   Cardiovascular:      Rate and Rhythm: Normal rate and regular rhythm.      Heart sounds: Normal heart sounds. No murmur heard.      Pulmonary:      Effort: Pulmonary effort is normal. No respiratory distress.      Breath sounds: Normal breath sounds. No wheezing or rales.   Chest:      Chest wall: No tenderness.   Abdominal:      General: Bowel sounds are normal. There is no distension.      Palpations: Abdomen is soft. There is no mass.      Tenderness: There is no abdominal tenderness. There is no guarding or rebound.      Hernia: No hernia is present.   Genitourinary:     Exam position: Prone.      Labia:         Right: No rash, tenderness or lesion.         Left: No rash, tenderness, lesion or injury.       Vagina: Normal. No vaginal discharge or tenderness.      Cervix: No cervical motion tenderness, discharge or friability.      Uterus: Not enlarged and not tender.       Adnexa:         Right: No mass or tenderness.          Left: No mass or tenderness.        Comments: Urethra and urethral meatus normal.    Bladder - normal, no prolapse.  Perineum and rectum examined - intact and no lesions.    Musculoskeletal:         General: No tenderness or deformity. Normal range of motion.      Cervical back: Normal range of motion and neck supple.  "  Lymphadenopathy:      Cervical: No cervical adenopathy.   Skin:     General: Skin is warm and dry.      Coloration: Skin is not pale.      Findings: No erythema or rash.   Neurological:      Mental Status: She is alert and oriented to person, place, and time.      Motor: No abnormal muscle tone.      Coordination: Coordination normal.      Deep Tendon Reflexes: Reflexes are normal and symmetric.   Psychiatric:         Behavior: Behavior normal. Behavior is cooperative.         Thought Content: Thought content normal.         Judgment: Judgment normal.           Vitals:    12/06/21 1425   BP: 126/72   Weight: 98.4 kg (217 lb)   Height: 157.5 cm (62\")       Diagnoses and all orders for this visit:    1. Well woman exam with routine gynecological exam (Primary)  Comments:  Normal well woman exam.  ThinPrep Pap smear done.  Mammogram ordered.  Orders:  -     Liquid-based Pap Smear, Screening  -     HPV DNA Probe, Direct - ThinPrep Vial, Cervix    2. Cervical cancer screening  Comments:  ThinPrep Pap smear done.    3. Encounter for screening mammogram for malignant neoplasm of breast  Comments:  Mammogram ordered.    4. Menstrual migraine without status migrainosus, not intractable  Comments:  Patient is on Vera for both birth control and menstrual migraines.  Doing well on it.  Refill sent to pharmacy.  Orders:  -     Levonorgest-Eth Estrad 91-Day (Amethia) 0.15-0.03 &0.01 MG tablet; Take 1 tablet by mouth Daily.  Dispense: 91 each; Refill: 0        Normal GYN exam. Will have lab work here. Encouraged SBE.  Pt is aware how to do self breast exam and the importance of same. Discussed weight management and importance of maintaining a healthy weight. Discussed Vitamin D intake and the importance of adequate vitamin D for both bone health and a healthy immune system.  Discussed daily exercise and the importance of same in regards to a healthy heart as well as helping to maintain her weight and improving her mental " health.  Body mass index is 39.69 kg/m². Colonoscopy is not age appropriate.  Mammogram will be scheduled at [facility]. Pap smear is done per AS[facility]P guidelines.    Patient's Body mass index is 39.69 kg/m². indicating that she is obese (BMI >30). Obesity-related health conditions include the following: hypertension. Obesity is unchanged. BMI is is above average; BMI management plan is completed. We discussed low calorie, low carb based diet program, portion control and increasing exercise..             Non-Smoker    MyChart Instructions Given

## 2021-12-07 LAB
25(OH)D3+25(OH)D2 SERPL-MCNC: 35.4 NG/ML (ref 30–100)
ALBUMIN SERPL-MCNC: 4.4 G/DL (ref 3.5–5.2)
ALBUMIN/GLOB SERPL: 1.5 G/DL
ALP SERPL-CCNC: 67 U/L (ref 39–117)
ALT SERPL-CCNC: 19 U/L (ref 1–33)
APPEARANCE UR: CLEAR
AST SERPL-CCNC: 19 U/L (ref 1–32)
BACTERIA #/AREA URNS HPF: NORMAL /HPF
BASOPHILS # BLD AUTO: 0.06 10*3/MM3 (ref 0–0.2)
BASOPHILS NFR BLD AUTO: 0.7 % (ref 0–1.5)
BILIRUB SERPL-MCNC: 0.3 MG/DL (ref 0–1.2)
BILIRUB UR QL STRIP: NEGATIVE
BUN SERPL-MCNC: 9 MG/DL (ref 6–20)
BUN/CREAT SERPL: 10.5 (ref 7–25)
CALCIUM SERPL-MCNC: 9.6 MG/DL (ref 8.6–10.5)
CASTS URNS MICRO: NORMAL
CHLORIDE SERPL-SCNC: 106 MMOL/L (ref 98–107)
CHOLEST SERPL-MCNC: 212 MG/DL (ref 0–200)
CO2 SERPL-SCNC: 25.4 MMOL/L (ref 22–29)
COLOR UR: YELLOW
CREAT SERPL-MCNC: 0.86 MG/DL (ref 0.57–1)
EOSINOPHIL # BLD AUTO: 0.16 10*3/MM3 (ref 0–0.4)
EOSINOPHIL NFR BLD AUTO: 1.9 % (ref 0.3–6.2)
EPI CELLS #/AREA URNS HPF: NORMAL /HPF
ERYTHROCYTE [DISTWIDTH] IN BLOOD BY AUTOMATED COUNT: 12.4 % (ref 12.3–15.4)
GLOBULIN SER CALC-MCNC: 3 GM/DL
GLUCOSE SERPL-MCNC: 94 MG/DL (ref 65–99)
GLUCOSE UR QL: NEGATIVE
HCT VFR BLD AUTO: 43.7 % (ref 34–46.6)
HDLC SERPL-MCNC: 55 MG/DL (ref 40–60)
HGB BLD-MCNC: 14.3 G/DL (ref 12–15.9)
HGB UR QL STRIP: NEGATIVE
IMM GRANULOCYTES # BLD AUTO: 0.02 10*3/MM3 (ref 0–0.05)
IMM GRANULOCYTES NFR BLD AUTO: 0.2 % (ref 0–0.5)
KETONES UR QL STRIP: NEGATIVE
LDLC SERPL CALC-MCNC: 142 MG/DL (ref 0–100)
LEUKOCYTE ESTERASE UR QL STRIP: NEGATIVE
LYMPHOCYTES # BLD AUTO: 2.21 10*3/MM3 (ref 0.7–3.1)
LYMPHOCYTES NFR BLD AUTO: 25.8 % (ref 19.6–45.3)
MCH RBC QN AUTO: 28.3 PG (ref 26.6–33)
MCHC RBC AUTO-ENTMCNC: 32.7 G/DL (ref 31.5–35.7)
MCV RBC AUTO: 86.5 FL (ref 79–97)
MONOCYTES # BLD AUTO: 0.41 10*3/MM3 (ref 0.1–0.9)
MONOCYTES NFR BLD AUTO: 4.8 % (ref 5–12)
NEUTROPHILS # BLD AUTO: 5.71 10*3/MM3 (ref 1.7–7)
NEUTROPHILS NFR BLD AUTO: 66.6 % (ref 42.7–76)
NITRITE UR QL STRIP: NEGATIVE
NRBC BLD AUTO-RTO: 0 /100 WBC (ref 0–0.2)
PH UR STRIP: 6 [PH] (ref 5–8)
PLATELET # BLD AUTO: 325 10*3/MM3 (ref 140–450)
POTASSIUM SERPL-SCNC: 4.7 MMOL/L (ref 3.5–5.2)
PROT SERPL-MCNC: 7.4 G/DL (ref 6–8.5)
PROT UR QL STRIP: NEGATIVE
RBC # BLD AUTO: 5.05 10*6/MM3 (ref 3.77–5.28)
RBC #/AREA URNS HPF: NORMAL /HPF
SODIUM SERPL-SCNC: 139 MMOL/L (ref 136–145)
SP GR UR: NORMAL (ref 1–1.03)
TRIGL SERPL-MCNC: 84 MG/DL (ref 0–150)
TSH SERPL DL<=0.005 MIU/L-ACNC: 3.66 UIU/ML (ref 0.27–4.2)
URATE SERPL-MCNC: 4.5 MG/DL (ref 2.4–5.7)
UROBILINOGEN UR STRIP-MCNC: NORMAL MG/DL
VLDLC SERPL CALC-MCNC: 15 MG/DL (ref 5–40)
WBC # BLD AUTO: 8.57 10*3/MM3 (ref 3.4–10.8)
WBC #/AREA URNS HPF: NORMAL /HPF

## 2021-12-13 DIAGNOSIS — G43.719 INTRACTABLE CHRONIC MIGRAINE WITHOUT AURA AND WITHOUT STATUS MIGRAINOSUS: ICD-10-CM

## 2021-12-14 RX ORDER — NAPROXEN 500 MG/1
TABLET ORAL
Qty: 30 TABLET | Refills: 1 | Status: SHIPPED | OUTPATIENT
Start: 2021-12-14 | End: 2022-03-29 | Stop reason: SDUPTHER

## 2022-01-03 ENCOUNTER — OFFICE VISIT (OUTPATIENT)
Dept: INTERNAL MEDICINE | Facility: CLINIC | Age: 42
End: 2022-01-03

## 2022-01-03 VITALS
BODY MASS INDEX: 40.67 KG/M2 | OXYGEN SATURATION: 99 % | TEMPERATURE: 97.1 F | HEART RATE: 77 BPM | DIASTOLIC BLOOD PRESSURE: 84 MMHG | SYSTOLIC BLOOD PRESSURE: 124 MMHG | WEIGHT: 221 LBS | HEIGHT: 62 IN

## 2022-01-03 DIAGNOSIS — I10 ESSENTIAL HYPERTENSION: ICD-10-CM

## 2022-01-03 DIAGNOSIS — E03.9 HYPOTHYROIDISM, UNSPECIFIED TYPE: ICD-10-CM

## 2022-01-03 DIAGNOSIS — G43.719 INTRACTABLE CHRONIC MIGRAINE WITHOUT AURA AND WITHOUT STATUS MIGRAINOSUS: ICD-10-CM

## 2022-01-03 DIAGNOSIS — Z12.31 SCREENING MAMMOGRAM, ENCOUNTER FOR: ICD-10-CM

## 2022-01-03 DIAGNOSIS — Z23 NEED FOR TDAP VACCINATION: Primary | ICD-10-CM

## 2022-01-03 PROCEDURE — 99214 OFFICE O/P EST MOD 30 MIN: CPT | Performed by: INTERNAL MEDICINE

## 2022-01-03 PROCEDURE — 99396 PREV VISIT EST AGE 40-64: CPT | Performed by: INTERNAL MEDICINE

## 2022-01-03 RX ORDER — LEVOTHYROXINE SODIUM 0.05 MG/1
50 TABLET ORAL DAILY
COMMUNITY
Start: 2021-12-13 | End: 2022-03-07

## 2022-01-03 NOTE — PROGRESS NOTES
Chief Complaint   Patient presents with   • Annual Exam         History:  Talita English is a 41 y.o. female who presents today for evaluation of the above problems.  Patient is here for annual wellness evaluation.  Since have seen her last she has been off of and back on her Aimovig for her migraine headaches.  She is also been working with her neurologist on the multiplicity of medications that she is currently taking for migraine headaches.  Otherwise patient seems to be doing well.        ROS:  Review of Systems   Constitutional: Negative for activity change, appetite change, fatigue, fever, unexpected weight gain and unexpected weight loss.   HENT: Negative for swollen glands, trouble swallowing and voice change.    Eyes: Negative for blurred vision and visual disturbance.   Respiratory: Negative for cough and shortness of breath.    Cardiovascular: Negative for chest pain, palpitations and leg swelling.   Gastrointestinal: Negative for abdominal pain, constipation, diarrhea, nausea, vomiting and indigestion.   Endocrine: Negative for cold intolerance, heat intolerance, polydipsia and polyphagia.   Genitourinary: Negative for dysuria and frequency.   Musculoskeletal: Negative for arthralgias, back pain, joint swelling and neck pain.   Skin: Negative for color change, rash and skin lesions.   Neurological: Positive for headache ( Chronic migraines). Negative for dizziness, weakness, memory problem and confusion.   Hematological: Does not bruise/bleed easily.   Psychiatric/Behavioral: Negative for agitation, hallucinations and suicidal ideas. The patient is not nervous/anxious.        Allergies   Allergen Reactions   • Codeine Nausea And Vomiting   • Elavil [Amitriptyline Hcl] Rash   • Maxalt [Rizatriptan Benzoate] Nausea Only   • Relpax [Eletriptan] Nausea Only     Past Medical History:   Diagnosis Date   • Disease of thyroid gland     hypothyroid   • Fibromyalgia    • GERD (gastroesophageal reflux disease)     • Hypertension    • Migraines      Past Surgical History:   Procedure Laterality Date   •  SECTION     • ENDOSCOPY N/A 2019    Normal 1st and 2nd portions of the duodenum-biopsied; Normal stomach; LA grade A esophagitis; No esophageal abnormality to explain patient's dysphagia-esophagus dilated      Family History   Problem Relation Age of Onset   • Hypertension Mother    • Melanoma Mother    • Cancer Father         bladder   • Heart attack Father    • Heart disease Father    • Arrhythmia Father    • Abnormal EKG Father    • Colon cancer Paternal Uncle    • Migraines Brother    • Hypertension Brother    • Heart attack Sister    • Hypertension Sister    • Heart disease Maternal Grandmother    • Heart attack Maternal Grandfather    • Heart attack Paternal Grandmother    • Aneurysm Paternal Grandfather    • Colon polyps Neg Hx    • Seizures Neg Hx    • Stroke Neg Hx    • Breast cancer Neg Hx    • Ovarian cancer Neg Hx    • Uterine cancer Neg Hx      The patient  reports that she has never smoked. She has never used smokeless tobacco. She reports current alcohol use. She reports that she does not use drugs.  Immunization History   Administered Date(s) Administered   • COVID-19 (PFIZER) 01/15/2021, 2021, 10/13/2021   • FluLaval/Fluarix/Fluzone >6 2020          Current Outpatient Medications:   •  cetirizine (zyrTEC) 10 MG tablet, Take 1 tablet by mouth Daily., Disp: 90 tablet, Rfl: 3  •  cyclobenzaprine (FLEXERIL) 10 MG tablet, Take 1 tablet by mouth 3 (Three) Times a Day As Needed for Muscle Spasms., Disp: 90 tablet, Rfl: 1  •  Erenumab-aooe (Aimovig) 140 MG/ML prefilled syringe, Inject 1 mL under the skin into the appropriate area as directed Every 30 (Thirty) Days., Disp: 1 pen, Rfl: 12  •  levETIRAcetam (KEPPRA) 500 MG tablet, Take 3 tablets by mouth Daily. TAKE 1 TABLET BY MOUTH EVERY MORNING AND 2 TABLETS AT NIGHT, Disp: 90 tablet, Rfl: 3  •  Levonorgest-Eth Estrad -Day (Amethia)  "0.15-0.03 &0.01 MG tablet, Take 1 tablet by mouth Daily., Disp: 91 each, Rfl: 0  •  levothyroxine (SYNTHROID, LEVOTHROID) 50 MCG tablet, Take 50 mcg by mouth Daily., Disp: , Rfl:   •  memantine (NAMENDA) 10 MG tablet, Take 1 tablet by mouth 2 (Two) Times a Day., Disp: 60 tablet, Rfl: 3  •  metoprolol tartrate (LOPRESSOR) 25 MG tablet, Take 1 tablet by mouth 2 (Two) Times a Day., Disp: 180 tablet, Rfl: 3  •  naproxen (NAPROSYN) 500 MG tablet, TAKE 1 TABLET BY MOUTH TWICE DAILY AS NEEDED FOR MODERATE PAIN OR HEADACHE, Disp: 30 tablet, Rfl: 1  •  prochlorperazine (COMPAZINE) 10 MG tablet, Take 1 tablet by mouth Every 6 (Six) Hours As Needed for Nausea or Vomiting., Disp: 60 tablet, Rfl: 3  •  Rimegepant Sulfate (NURTEC) 75 MG tablet dispersible tablet, Take 1 tablet by mouth Take As Directed. TAKE ONE TABLET EVERY OTHER DAY, AS DIRECTED., Disp: 16 tablet, Rfl: 11  •  SUMAtriptan (IMITREX) 50 MG tablet, TAKE 1 TABLET BY MOUTH AT ONSET OF HEADACHE. MAY REPEAT DOSE 1 TIME IN 2 HOURS IF HEADACHE NOT RELIEVED (Patient taking differently: Take 50 mg by mouth 1 (One) Time As Needed. TAKE 1 TABLET BY MOUTH AT ONSET OF HEADACHE. MAY REPEAT DOSE 1 TIME IN 2 HOURS IF HEADACHE NOT RELIEVED), Disp: 9 tablet, Rfl: 0    OBJECTIVE:  /84 (BP Location: Left arm, Patient Position: Sitting, Cuff Size: Adult)   Pulse 77   Temp 97.1 °F (36.2 °C) (Temporal)   Ht 157.5 cm (62\")   Wt 100 kg (221 lb)   SpO2 99%   Breastfeeding No   BMI 40.42 kg/m²    Physical Exam  Constitutional:       Appearance: She is well-developed.   HENT:      Head: Normocephalic and atraumatic.   Eyes:      Pupils: Pupils are equal, round, and reactive to light.   Cardiovascular:      Rate and Rhythm: Normal rate and regular rhythm.   Pulmonary:      Effort: Pulmonary effort is normal.      Breath sounds: Normal breath sounds.   Abdominal:      General: Bowel sounds are normal.      Palpations: Abdomen is soft.   Musculoskeletal:         General: Normal " range of motion.      Cervical back: Normal range of motion and neck supple.   Skin:     General: Skin is warm and dry.   Neurological:      Mental Status: She is alert and oriented to person, place, and time.   Psychiatric:         Behavior: Behavior normal.         The patient was counseled regarding nutrition, physical activity, healthy weight, immunizations and screenings.  Health Maintenance:  tdap      Assessment/Plan     Diagnoses and all orders for this visit:    1. Screening mammogram, encounter for (Primary)  -     Mammo Screening Digital Tomosynthesis Bilateral With CAD; Future    2. Intractable chronic migraine without aura and without status migrainosus    3. Hypothyroidism, unspecified type    4. Essential hypertension      At today's visit we completed her wellness evaluation.  Her mammograms were ordered.  She was also followed up for her thyroid and hypertension.  We spent some time talking about her current migraine treatment and plan including multiple medications.  She will continue to work with her neurologist on modifying those medications including memantine which she was placed on for migraines.     An After Visit Summary was printed and given to the patient at discharge.  Return in about 6 months (around 7/3/2022).         Yoandy Parks MD  1/3/2022   Electronically signed.

## 2022-01-04 PROCEDURE — 90471 IMMUNIZATION ADMIN: CPT | Performed by: INTERNAL MEDICINE

## 2022-01-04 PROCEDURE — 90715 TDAP VACCINE 7 YRS/> IM: CPT | Performed by: INTERNAL MEDICINE

## 2022-01-10 ENCOUNTER — TELEPHONE (OUTPATIENT)
Dept: NEUROLOGY | Facility: CLINIC | Age: 42
End: 2022-01-10

## 2022-01-10 NOTE — TELEPHONE ENCOUNTER
Pharmacy Name:  [facility]    Pharmacy representative name: MONTANA    Pharmacy representative phone number: [phone]  SECURE LINE    What medication are you calling in regards to: NURTEC    What question does the pharmacy have:   MONTANA IS WANTING TO KNOW THE STATUS OF THE [facility]    Who is the provider that prescribed the medication: DANNY

## 2022-02-13 DIAGNOSIS — G43.719 INTRACTABLE CHRONIC MIGRAINE WITHOUT AURA AND WITHOUT STATUS MIGRAINOSUS: ICD-10-CM

## 2022-02-14 RX ORDER — SUMATRIPTAN 50 MG/1
TABLET, FILM COATED ORAL
Qty: 9 TABLET | Refills: 1 | Status: SHIPPED | OUTPATIENT
Start: 2022-02-14 | End: 2022-12-13 | Stop reason: SDUPTHER

## 2022-02-28 ENCOUNTER — HOSPITAL ENCOUNTER (OUTPATIENT)
Dept: MAMMOGRAPHY | Facility: HOSPITAL | Age: 42
Discharge: HOME | End: 2022-02-28
Admitting: INTERNAL MEDICINE

## 2022-02-28 DIAGNOSIS — Z12.31 SCREENING MAMMOGRAM, ENCOUNTER FOR: ICD-10-CM

## 2022-02-28 PROCEDURE — 77063 BREAST TOMOSYNTHESIS BI: CPT

## 2022-02-28 PROCEDURE — 77067 SCR MAMMO BI INCL CAD: CPT

## 2022-03-06 DIAGNOSIS — G43.719 INTRACTABLE CHRONIC MIGRAINE WITHOUT AURA AND WITHOUT STATUS MIGRAINOSUS: ICD-10-CM

## 2022-03-07 RX ORDER — MEMANTINE HYDROCHLORIDE 10 MG/1
TABLET ORAL
Qty: 60 TABLET | Refills: 5 | Status: SHIPPED | OUTPATIENT
Start: 2022-03-07 | End: 2022-08-29

## 2022-03-07 RX ORDER — LEVOTHYROXINE SODIUM 0.05 MG/1
TABLET ORAL
Qty: 90 TABLET | Refills: 1 | Status: SHIPPED | OUTPATIENT
Start: 2022-03-07 | End: 2022-08-25 | Stop reason: SDUPTHER

## 2022-03-13 DIAGNOSIS — G43.719 INTRACTABLE CHRONIC MIGRAINE WITHOUT AURA AND WITHOUT STATUS MIGRAINOSUS: ICD-10-CM

## 2022-03-14 RX ORDER — LEVETIRACETAM 500 MG/1
TABLET ORAL
Qty: 90 TABLET | Refills: 0 | Status: SHIPPED | OUTPATIENT
Start: 2022-03-14 | End: 2022-03-29 | Stop reason: SDUPTHER

## 2022-03-29 ENCOUNTER — OFFICE VISIT (OUTPATIENT)
Dept: NEUROLOGY | Facility: CLINIC | Age: 42
End: 2022-03-29

## 2022-03-29 VITALS
HEIGHT: 62 IN | DIASTOLIC BLOOD PRESSURE: 70 MMHG | SYSTOLIC BLOOD PRESSURE: 130 MMHG | BODY MASS INDEX: 39.38 KG/M2 | WEIGHT: 214 LBS | HEART RATE: 74 BPM

## 2022-03-29 DIAGNOSIS — G43.719 INTRACTABLE CHRONIC MIGRAINE WITHOUT AURA AND WITHOUT STATUS MIGRAINOSUS: ICD-10-CM

## 2022-03-29 PROCEDURE — 99214 OFFICE O/P EST MOD 30 MIN: CPT | Performed by: PHYSICIAN ASSISTANT

## 2022-03-29 RX ORDER — PROCHLORPERAZINE MALEATE 10 MG
10 TABLET ORAL EVERY 6 HOURS PRN
Qty: 60 TABLET | Refills: 3 | Status: SHIPPED | OUTPATIENT
Start: 2022-03-29 | End: 2022-12-08

## 2022-03-29 RX ORDER — NAPROXEN 500 MG/1
500 TABLET ORAL 2 TIMES DAILY PRN
Qty: 60 TABLET | Refills: 3 | Status: SHIPPED | OUTPATIENT
Start: 2022-03-29 | End: 2022-10-31

## 2022-03-29 RX ORDER — ERENUMAB-AOOE 140 MG/ML
140 INJECTION, SOLUTION SUBCUTANEOUS
Qty: 1 PEN | Refills: 12 | Status: SHIPPED | OUTPATIENT
Start: 2022-03-29 | End: 2022-08-05 | Stop reason: SDUPTHER

## 2022-03-29 RX ORDER — LEVETIRACETAM 500 MG/1
1500 TABLET ORAL DAILY
Qty: 90 TABLET | Refills: 3 | Status: SHIPPED | OUTPATIENT
Start: 2022-03-29 | End: 2022-08-05 | Stop reason: SDUPTHER

## 2022-03-29 NOTE — PROGRESS NOTES
Subjective   Talita English is a 42 y.o. female is here today for follow-up.    History of Present Illness     Talita English is a 42-year-old female who presents today for follow-up evaluation on migraine headaches. This is an established patient with significant intractable migraine. She has recently had an interruption in the use of Aimovig, because of insurance requirements and would like to restart this as her migraines are appreciably worse.     Migraines  The patient states Nurtec has improved her migraines. She states she prefers Nurtec over Imitrax due to having some unfavorable symptoms. She states a great combination that has provided relief for her has been Nurtec with Aimovig and if needed she will also take a naproxen. She reports experiencing more frequent migraines in this past month, but Nurtec has provided some relief. She has continued taking Keppra. The patient reports her speech has been affected, and sometimes she will begin slurring. She states people near her worry she may be experiencing a stroke or has been drinking alcohol, which is not the case.    Memory issues  The patient reports experiencing issues with memory and word finding. She states these episodes are also occurring outside of the migraine episodes. She reports experiencing more frequent migraines and the memory issues are occurring in between her migraines. The patient states she feels frustrated with the memory issues. 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     The following portions of the patient's history were reviewed and updated as appropriate: allergies, current medications, past family history, past medical history, past social history, past surgical history and problem list.    Review of Systems:  A review of systems was performed, and positive findings are noted in the HPI.      Current Outpatient Medications:   •  cyclobenzaprine  (FLEXERIL) 10 MG tablet, Take 1 tablet by mouth 3 (Three) Times a Day As Needed for Muscle Spasms., Disp: 90 tablet, Rfl: 1  •  Erenumab-aooe (Aimovig) 140 MG/ML prefilled syringe, Inject 1 mL under the skin into the appropriate area as directed Every 30 (Thirty) Days., Disp: 1 pen, Rfl: 12  •  levETIRAcetam (KEPPRA) 500 MG tablet, Take 3 tablets by mouth Daily. 1 tablet in the morning and 2 tablets in the evening, Disp: 90 tablet, Rfl: 3  •  Levonorgest-Eth Estrad 91-Day (Amethia) 0.15-0.03 &0.01 MG tablet, Take 1 tablet by mouth Daily., Disp: 91 each, Rfl: 0  •  levothyroxine (SYNTHROID, LEVOTHROID) 50 MCG tablet, TAKE 1 TABLET BY MOUTH DAILY (Patient taking differently: Take 50 mcg by mouth Daily.), Disp: 90 tablet, Rfl: 1  •  memantine (NAMENDA) 10 MG tablet, TAKE 1 TABLET BY MOUTH TWICE DAILY (Patient taking differently: Take 10 mg by mouth 2 (Two) Times a Day.), Disp: 60 tablet, Rfl: 5  •  metoprolol tartrate (LOPRESSOR) 25 MG tablet, Take 1 tablet by mouth 2 (Two) Times a Day., Disp: 180 tablet, Rfl: 3  •  naproxen (NAPROSYN) 500 MG tablet, Take 1 tablet by mouth 2 (Two) Times a Day As Needed for Mild Pain ., Disp: 60 tablet, Rfl: 3  •  prochlorperazine (COMPAZINE) 10 MG tablet, Take 1 tablet by mouth Every 6 (Six) Hours As Needed for Nausea or Vomiting., Disp: 60 tablet, Rfl: 3  •  Rimegepant Sulfate (NURTEC) 75 MG tablet dispersible tablet, Take 1 tablet by mouth Take As Directed. TAKE ONE TABLET EVERY OTHER DAY, AS DIRECTED., Disp: 16 tablet, Rfl: 11  •  SUMAtriptan (IMITREX) 50 MG tablet, TAKE 1 TABLET BY MOUTH AT ONSET OF HEADACHE. MAY REPEAT DOSE 1 TIME IN 2 HOURS IF HEADACHE NOT RELIEVED (Patient taking differently: Take 50 mg by mouth 1 (One) Time As Needed. TAKE 1 TABLET BY MOUTH AT ONSET OF HEADACHE. MAY REPEAT DOSE 1 TIME IN 2 HOURS IF HEADACHE NOT RELIEVED), Disp: 9 tablet, Rfl: 1     Objective   Physical Exam  Vitals and nursing note reviewed.   Constitutional:       General: She is not in acute  distress.     Appearance: Normal appearance. She is well-developed. She is not ill-appearing, toxic-appearing or diaphoretic.   HENT:      Head: Normocephalic and atraumatic.      Right Ear: External ear normal.      Left Ear: External ear normal.   Eyes:      Conjunctiva/sclera: Conjunctivae normal.      Pupils: Pupils are equal, round, and reactive to light.   Neck:      Thyroid: No thyromegaly.      Vascular: No carotid bruit or JVD.   Cardiovascular:      Rate and Rhythm: Normal rate and regular rhythm.      Pulses: Normal pulses.      Heart sounds: Normal heart sounds. No murmur heard.  Pulmonary:      Effort: Pulmonary effort is normal. No respiratory distress.      Breath sounds: Normal breath sounds.   Abdominal:      General: Bowel sounds are normal.      Palpations: Abdomen is soft. There is no mass.      Tenderness: There is no abdominal tenderness.   Musculoskeletal:         General: No swelling. Normal range of motion.      Cervical back: Normal range of motion and neck supple.   Lymphadenopathy:      Cervical: No cervical adenopathy.   Skin:     General: Skin is warm and dry.      Findings: No lesion or rash.   Neurological:      Mental Status: She is alert and oriented to person, place, and time.      Cranial Nerves: No cranial nerve deficit.      Sensory: No sensory deficit.      Motor: No weakness.      Coordination: Coordination normal.      Gait: Gait normal.      Deep Tendon Reflexes: Reflexes are normal and symmetric.   Psychiatric:         Mood and Affect: Mood normal.         Behavior: Behavior normal.         Thought Content: Thought content normal.         Judgment: Judgment normal.           Assessment/Plan   Diagnoses and all orders for this visit:    1. Intractable chronic migraine without aura and without status migrainosus  - The patient will continue taking Nurtec, Aimovig, naproxen and Keppra.   -     Erenumab-aooe (Aimovig) 140 MG/ML prefilled syringe; Inject 1 mL under the skin into  the appropriate area as directed Every 30 (Thirty) Days.  Dispense: 1 pen; Refill: 12  -     levETIRAcetam (KEPPRA) 500 MG tablet; Take 3 tablets by mouth Daily. 1 tablet in the morning and 2 tablets in the evening  Dispense: 90 tablet; Refill: 3  -     naproxen (NAPROSYN) 500 MG tablet; Take 1 tablet by mouth 2 (Two) Times a Day As Needed for Mild Pain .  Dispense: 60 tablet; Refill: 3  -     prochlorperazine (COMPAZINE) 10 MG tablet; Take 1 tablet by mouth Every 6 (Six) Hours As Needed for Nausea or Vomiting.  Dispense: 60 tablet; Refill: 3                     Transcribed from ambient dictation for CARLEE Guevara by Krista Rueda.  03/29/22   19:17 CDT    Patient verbalized consent to the visit recording.  I have personally performed the services described in this document as transcribed by the above individual, and it is both accurate and complete.  CARLEE Guevara  3/31/2022  19:49 CDT

## 2022-04-03 DIAGNOSIS — I10 ESSENTIAL HYPERTENSION: ICD-10-CM

## 2022-04-17 DIAGNOSIS — M62.838 MUSCLE SPASMS OF NECK: ICD-10-CM

## 2022-04-17 DIAGNOSIS — G43.829 MENSTRUAL MIGRAINE WITHOUT STATUS MIGRAINOSUS, NOT INTRACTABLE: ICD-10-CM

## 2022-04-18 RX ORDER — LEVONORGESTREL AND ETHINYL ESTRADIOL AND ETHINYL ESTRADIOL 150-30(84)
KIT ORAL
Qty: 91 EACH | Refills: 0 | Status: SHIPPED | OUTPATIENT
Start: 2022-04-18 | End: 2022-07-20 | Stop reason: SDUPTHER

## 2022-04-18 RX ORDER — CYCLOBENZAPRINE HCL 10 MG
TABLET ORAL
Qty: 90 TABLET | Refills: 1 | Status: SHIPPED | OUTPATIENT
Start: 2022-04-18 | End: 2022-12-12

## 2022-06-20 ENCOUNTER — TELEPHONE (OUTPATIENT)
Dept: NEUROLOGY | Facility: CLINIC | Age: 42
End: 2022-06-20

## 2022-06-20 NOTE — TELEPHONE ENCOUNTER
Caller: Talita English    Relationship: Self    Best call back number: [phone]    What was the call regarding:   PT'S INSURANCE IS DENYING THE AMIOVIG BECAUSE THE PT IS ALSO ON NURTEC. PT IS WANTING TO KNOW IF THERE IS SOMETHING [employer] CAN DO TO HELP THE PT GET HER INSURANCE TO ALLOW HER TO GET BOTH THE AIMOVIG AND THE NURTEC AS THEY BOTH ARE WORKING FOR HER.    PT IS ALSO REQUESTING AIMOVIG SAMPLES. CALLED THE OFFICE SPOKE W/JEFF AND WAS TOLD PT COULD HAVE. PT WILL HAVE HER  OR MOTHER STOP BY TO GET THEM.

## 2022-07-08 ENCOUNTER — TELEPHONE (OUTPATIENT)
Dept: INTERNAL MEDICINE | Facility: CLINIC | Age: 42
End: 2022-07-08

## 2022-07-08 NOTE — TELEPHONE ENCOUNTER
Caller: Talita English    Relationship to patient: Self    Best call back number: [phone]    Type of visit: OFFICE VISIT    Requested date: AUGUST 5TH OR 10TH     If rescheduling, when is the original appointment: 8/25/22     Additional notes: PATIENT WOULD LIKE CALLBACK TO SCHEDULE AN APPOINTMENT ON THESE DAYS.          
Informed pt there are no openings on 5th or 10th  
no

## 2022-07-17 DIAGNOSIS — G43.829 MENSTRUAL MIGRAINE WITHOUT STATUS MIGRAINOSUS, NOT INTRACTABLE: ICD-10-CM

## 2022-07-18 RX ORDER — LEVONORGESTREL AND ETHINYL ESTRADIOL AND ETHINYL ESTRADIOL 150-30(84)
KIT ORAL
OUTPATIENT
Start: 2022-07-18

## 2022-07-20 ENCOUNTER — MEDICATION THERAPY MANAGEMENT (OUTPATIENT)
Dept: OBSTETRICS AND GYNECOLOGY | Facility: CLINIC | Age: 42
End: 2022-07-20

## 2022-07-20 DIAGNOSIS — G43.829 MENSTRUAL MIGRAINE WITHOUT STATUS MIGRAINOSUS, NOT INTRACTABLE: ICD-10-CM

## 2022-07-20 RX ORDER — LEVONORGESTREL / ETHINYL ESTRADIOL AND ETHINYL ESTRADIOL 150-30(84)
1 KIT ORAL DAILY
Qty: 91 EACH | Refills: 0 | Status: SHIPPED | OUTPATIENT
Start: 2022-07-20 | End: 2022-10-10

## 2022-08-05 ENCOUNTER — OFFICE VISIT (OUTPATIENT)
Dept: NEUROLOGY | Facility: CLINIC | Age: 42
End: 2022-08-05

## 2022-08-05 VITALS
HEIGHT: 62 IN | WEIGHT: 214 LBS | DIASTOLIC BLOOD PRESSURE: 70 MMHG | BODY MASS INDEX: 39.38 KG/M2 | OXYGEN SATURATION: 98 % | SYSTOLIC BLOOD PRESSURE: 136 MMHG | HEART RATE: 80 BPM

## 2022-08-05 DIAGNOSIS — G43.719 INTRACTABLE CHRONIC MIGRAINE WITHOUT AURA AND WITHOUT STATUS MIGRAINOSUS: ICD-10-CM

## 2022-08-05 PROCEDURE — 99214 OFFICE O/P EST MOD 30 MIN: CPT | Performed by: PHYSICIAN ASSISTANT

## 2022-08-05 RX ORDER — PREDNISONE 10 MG/1
TABLET ORAL
Qty: 45 TABLET | Refills: 0 | Status: SHIPPED | OUTPATIENT
Start: 2022-08-05 | End: 2022-12-08

## 2022-08-05 RX ORDER — ERENUMAB-AOOE 140 MG/ML
140 INJECTION, SOLUTION SUBCUTANEOUS
Qty: 1 PEN | Refills: 11 | Status: SHIPPED | OUTPATIENT
Start: 2022-08-05

## 2022-08-05 RX ORDER — LEVETIRACETAM 500 MG/1
1500 TABLET ORAL DAILY
Qty: 90 TABLET | Refills: 3 | Status: SHIPPED | OUTPATIENT
Start: 2022-08-05 | End: 2022-12-12

## 2022-08-05 NOTE — PROGRESS NOTES
Subjective   Talita English is a 42 y.o. female who presents for follow-up of intractable chronic migraine without aura without status migrainosus.     History of Present Illness     Migraine without aura without status migrainosus, not intractable  The patient reports her migraines gradually started getting more frequent about 1 week for about 1 month. She states about 3 weeks ago, she experienced a really bad cycle and since then it has been one she can not get rid of. The patient states the pain is very intense and her medication takes the edge off. She states she gets a little better for a day or two and then it comes right back. The patient states she has been taking the Nurtec as soon as she feels it. She states she took one last night and then this morning. The patient states it seemed like it helped a little bit for a while, but by the time she drove down here, it came back. She states she has not tried the Imitrex in the last 3 weeks. The patient states she was trying to take the Nurtec every other day, but when she had to take it 2 or 3 times a day she ran out of medication very quickly, even though she only gets 9 a month. She states she has been on it continuous once a month.    The following portions of the patient's history were reviewed and updated as appropriate: allergies, current medications, past family history, past medical history, past social history, past surgical history and problem list.    The following portions of the patient's history were reviewed and updated as appropriate: allergies, current medications, past family history, past medical history, past social history, past surgical history and problem list.    Review of Systems:  A review of systems was performed, and positive findings are noted in the HPI.      Current Outpatient Medications:   •  cyclobenzaprine (FLEXERIL) 10 MG tablet, TAKE 1 TABLET BY MOUTH THREE TIMES DAILY AS NEEDED FOR MUSCLE SPASMS (Patient taking  differently: Take 10 mg by mouth 3 (Three) Times a Day As Needed.), Disp: 90 tablet, Rfl: 1  •  Erenumab-aooe (Aimovig) 140 MG/ML prefilled syringe, Inject 1 mL under the skin into the appropriate area as directed Every 30 (Thirty) Days., Disp: 1 pen, Rfl: 11  •  levETIRAcetam (KEPPRA) 500 MG tablet, Take 3 tablets by mouth Daily. 1 tablet in the morning and 2 tablets in the evening, Disp: 90 tablet, Rfl: 3  •  Levonorgest-Eth Estrad 91-Day ([place]) 0.15-0.03 &0.01 MG tablet, Take 1 tablet by mouth Daily., Disp: 91 each, Rfl: 0  •  levothyroxine (SYNTHROID, LEVOTHROID) 50 MCG tablet, TAKE 1 TABLET BY MOUTH DAILY (Patient taking differently: Take 50 mcg by mouth Daily.), Disp: 90 tablet, Rfl: 1  •  memantine (NAMENDA) 10 MG tablet, TAKE 1 TABLET BY MOUTH TWICE DAILY (Patient taking differently: Take 10 mg by mouth 2 (Two) Times a Day.), Disp: 60 tablet, Rfl: 5  •  metoprolol tartrate (LOPRESSOR) 25 MG tablet, TAKE 1 TABLET BY MOUTH TWICE DAILY (Patient taking differently: Take 25 mg by mouth 2 (Two) Times a Day.), Disp: 180 tablet, Rfl: 3  •  naproxen (NAPROSYN) 500 MG tablet, Take 1 tablet by mouth 2 (Two) Times a Day As Needed for Mild Pain ., Disp: 60 tablet, Rfl: 3  •  prochlorperazine (COMPAZINE) 10 MG tablet, Take 1 tablet by mouth Every 6 (Six) Hours As Needed for Nausea or Vomiting., Disp: 60 tablet, Rfl: 3  •  Rimegepant Sulfate (NURTEC) 75 MG tablet dispersible tablet, Take 1 tablet by mouth Take As Directed. TAKE ONE TABLET EVERY OTHER DAY, AS DIRECTED., Disp: 16 tablet, Rfl: 11  •  SUMAtriptan (IMITREX) 50 MG tablet, TAKE 1 TABLET BY MOUTH AT ONSET OF HEADACHE. MAY REPEAT DOSE 1 TIME IN 2 HOURS IF HEADACHE NOT RELIEVED (Patient taking differently: Take 50 mg by mouth 1 (One) Time As Needed. TAKE 1 TABLET BY MOUTH AT ONSET OF HEADACHE. MAY REPEAT DOSE 1 TIME IN 2 HOURS IF HEADACHE NOT RELIEVED), Disp: 9 tablet, Rfl: 1  •  predniSONE (DELTASONE) 10 MG tablet, 50 mg for 3 days, 40 mg for 3 days, 30 mg for  3 days, 20 mg for 3 days, 10 mg for 3 days, then D/C, Disp: 45 tablet, Rfl: 0     Objective   Physical Exam  Vitals and nursing note reviewed.   HENT:      Head: Normocephalic.      Right Ear: Hearing and external ear normal.      Left Ear: Hearing and external ear normal.      Nose: Nose normal.      Mouth/Throat:      Pharynx: Oropharynx is clear.   Eyes:      General: Lids are normal. Vision grossly intact. Gaze aligned appropriately. No scleral icterus.     Extraocular Movements: Extraocular movements intact.      Conjunctiva/sclera: Conjunctivae normal.      Pupils: Pupils are equal, round, and reactive to light.      Visual Fields: Right eye visual fields normal and left eye visual fields normal.   Neck:      Vascular: No carotid bruit or JVD.      Trachea: Trachea and phonation normal.   Cardiovascular:      Rate and Rhythm: Normal rate.      Heart sounds: Normal heart sounds.   Pulmonary:      Effort: Pulmonary effort is normal.      Breath sounds: Normal breath sounds.   Musculoskeletal:      Cervical back: Normal range of motion.   Skin:     General: Skin is warm and dry.   Neurological:      Mental Status: She is alert and oriented to person, place, and time.      GCS: GCS eye subscore is 4. GCS verbal subscore is 5. GCS motor subscore is 6.      Cranial Nerves: Cranial nerves are intact.      Sensory: Sensation is intact.      Motor: Motor function is intact.      Coordination: Coordination is intact.      Gait: Gait is intact.      Deep Tendon Reflexes: Reflexes are normal and symmetric.   Psychiatric:         Attention and Perception: Attention and perception normal.         Mood and Affect: Mood and affect normal.         Speech: Speech normal.         Behavior: Behavior normal.         Thought Content: Thought content normal.         Cognition and Memory: Cognition and memory normal.         Judgment: Judgment normal.           Assessment & Plan   Diagnoses and all orders for this visit:    1.  Intractable chronic migraine without aura and without status migrainosus  -     levETIRAcetam (KEPPRA) 500 MG tablet; Take 3 tablets by mouth Daily. 1 tablet in the morning and 2 tablets in the evening  Dispense: 90 tablet; Refill: 3  -     Erenumab-aooe (Aimovig) 140 MG/ML prefilled syringe; Inject 1 mL under the skin into the appropriate area as directed Every 30 (Thirty) Days.  Dispense: 1 pen; Refill: 11  -     predniSONE (DELTASONE) 10 MG tablet; 50 mg for 3 days, 40 mg for 3 days, 30 mg for 3 days, 20 mg for 3 days, 10 mg for 3 days, then D/C  Dispense: 45 tablet; Refill: 0  -     Rimegepant Sulfate (NURTEC) 75 MG tablet dispersible tablet; Take 1 tablet by mouth Take As Directed. TAKE ONE TABLET EVERY OTHER DAY, AS DIRECTED.  Dispense: 16 tablet; Refill: 11                     Transcribed from ambient dictation for CARLEE Guevara by Laura Loaiza.  08/05/22   15:50 CDT    Patient verbalized consent to the visit recording.

## 2022-08-13 DIAGNOSIS — G43.719 INTRACTABLE CHRONIC MIGRAINE WITHOUT AURA AND WITHOUT STATUS MIGRAINOSUS: ICD-10-CM

## 2022-08-15 RX ORDER — LEVETIRACETAM 500 MG/1
TABLET ORAL
Qty: 90 TABLET | Refills: 3 | OUTPATIENT
Start: 2022-08-15

## 2022-08-25 ENCOUNTER — OFFICE VISIT (OUTPATIENT)
Dept: INTERNAL MEDICINE | Facility: CLINIC | Age: 42
End: 2022-08-25

## 2022-08-25 VITALS
TEMPERATURE: 97.1 F | SYSTOLIC BLOOD PRESSURE: 134 MMHG | BODY MASS INDEX: 39.93 KG/M2 | HEIGHT: 62 IN | OXYGEN SATURATION: 98 % | DIASTOLIC BLOOD PRESSURE: 84 MMHG | WEIGHT: 217 LBS | HEART RATE: 84 BPM

## 2022-08-25 DIAGNOSIS — F51.01 PRIMARY INSOMNIA: ICD-10-CM

## 2022-08-25 DIAGNOSIS — I10 ESSENTIAL HYPERTENSION: Primary | ICD-10-CM

## 2022-08-25 DIAGNOSIS — R12 HEARTBURN: ICD-10-CM

## 2022-08-25 DIAGNOSIS — E03.9 HYPOTHYROIDISM, UNSPECIFIED TYPE: ICD-10-CM

## 2022-08-25 DIAGNOSIS — E66.01 MORBID (SEVERE) OBESITY DUE TO EXCESS CALORIES: ICD-10-CM

## 2022-08-25 DIAGNOSIS — R00.2 PALPITATIONS: ICD-10-CM

## 2022-08-25 PROCEDURE — 99214 OFFICE O/P EST MOD 30 MIN: CPT | Performed by: FAMILY MEDICINE

## 2022-08-25 RX ORDER — LEVOTHYROXINE SODIUM 0.05 MG/1
50 TABLET ORAL DAILY
Qty: 90 TABLET | Refills: 3 | Status: SHIPPED | OUTPATIENT
Start: 2022-08-25

## 2022-08-25 RX ORDER — LEVOTHYROXINE SODIUM 0.05 MG/1
50 TABLET ORAL DAILY
Qty: 90 TABLET | Refills: 3 | Status: SHIPPED | OUTPATIENT
Start: 2022-08-25 | End: 2022-08-25 | Stop reason: SDUPTHER

## 2022-08-25 NOTE — PROGRESS NOTES
"        Subjective     Chief Complaint   Patient presents with   • Hypertension     6 month follow up   • Hypothyroidism       History of Present Illness  She does not monitor her blood pressure at home regularly. She is currently taking metoprolol 25 mg twice daily for dysthymia. The patient tries to avoid salt in her diet. She is tolerating levothyroxine well. She notes that she has a family history of heart disease.    The patient walks a lot at work and does physical therapy. 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 She tries to get on the elliptical at least once or twice per week. She has gained some weight back due to being busy with her daughter.    The patient has occasional acid reflux which is triggered when she eats grains. She notes that she takes Tums during episodes of acid reflux.  She was put on prednisone and Imitrex because she has horrible migraines. Her headaches have improved. She continues to have 1 episode of migraine weekly.    The patient had a flare in 06/2022 with facial rash, eye swelling, and burning. She called her rheumatologist and was started on ramipril and steroids for that. The patient notes new brown spots on her face. Her mother had a history of melanoma.    She notes that she is always tired and has trouble staying asleep at night. She does not want to take medication if she does not have to. She notes that when she goes to sleep she remembers \"50 things\" she forgot to do that day which wakes her up for 2-3 hours in the middle of the night. She confirms getting vaccinated against influenza every year.    Patient's PMR from outside medical facility reviewed and noted.    Review of Systems   Psychiatric/Behavioral: Positive for sleep disturbance.        Otherwise complete ROS reviewed and negative except as mentioned in the HPI.    Past Medical History:   Past Medical History:   Diagnosis Date   • Disease of thyroid gland     hypothyroid "   • Fibromyalgia    • GERD (gastroesophageal reflux disease)    • Hypertension    • Migraines      Past Surgical History:  Past Surgical History:   Procedure Laterality Date   •  SECTION     • ENDOSCOPY N/A 2019    Normal 1st and 2nd portions of the duodenum-biopsied; Normal stomach; LA grade A esophagitis; No esophageal abnormality to explain patient's dysphagia-esophagus dilated      Social History:  reports that she has never smoked. She has never used smokeless tobacco. She reports current alcohol use. She reports that she does not use drugs.    Family History: family history includes Abnormal EKG in her father; Aneurysm in her paternal grandfather; Arrhythmia in her father; Cancer in her father; Colon cancer in her paternal uncle; Heart attack in her father, maternal grandfather, paternal grandmother, and sister; Heart disease in her father and maternal grandmother; Hypertension in her brother, mother, and sister; Melanoma in her mother; Migraines in her brother.      Allergies:  Allergies   Allergen Reactions   • Codeine Nausea And Vomiting   • Elavil [Amitriptyline Hcl] Rash   • Maxalt [Rizatriptan Benzoate] Nausea Only   • Relpax [Eletriptan] Nausea Only     Medications:  Prior to Admission medications    Medication Sig Start Date End Date Taking? Authorizing Provider   cyclobenzaprine (FLEXERIL) 10 MG tablet TAKE 1 TABLET BY MOUTH THREE TIMES DAILY AS NEEDED FOR MUSCLE SPASMS  Patient taking differently: Take 10 mg by mouth 3 (Three) Times a Day As Needed. 22  Yes Kiana Arellano DO   Erenumab-aooe (Aimovig) 140 MG/ML prefilled syringe Inject 1 mL under the skin into the appropriate area as directed Every 30 (Thirty) Days. 22  Yes Leandro Jang PA   levETIRAcetam (KEPPRA) 500 MG tablet Take 3 tablets by mouth Daily. 1 tablet in the morning and 2 tablets in the evening 22  Yes Leandro Jang PA   Levonorgest-Eth Estrad -Day (Simpesse) 0.15-0.03 &0.01 MG  tablet Take 1 tablet by mouth Daily. 7/20/22  Yes Pattie Martinez APRN   levothyroxine (SYNTHROID, LEVOTHROID) 50 MCG tablet Take 1 tablet by mouth Daily. 8/25/22  Yes Kiana Arellano DO   memantine (NAMENDA) 10 MG tablet TAKE 1 TABLET BY MOUTH TWICE DAILY  Patient taking differently: Take 10 mg by mouth 2 (Two) Times a Day. 3/7/22  Yes Leandro Jang PA   metoprolol tartrate (LOPRESSOR) 25 MG tablet TAKE 1 TABLET BY MOUTH TWICE DAILY  Patient taking differently: Take 25 mg by mouth 2 (Two) Times a Day. 4/4/22  Yes Kiana Arellano DO   naproxen (NAPROSYN) 500 MG tablet Take 1 tablet by mouth 2 (Two) Times a Day As Needed for Mild Pain . 3/29/22  Yes Leandro Jang PA   prochlorperazine (COMPAZINE) 10 MG tablet Take 1 tablet by mouth Every 6 (Six) Hours As Needed for Nausea or Vomiting. 3/29/22  Yes Leandro Jang PA   Rimegepant Sulfate (NURTEC) 75 MG tablet dispersible tablet Take 1 tablet by mouth Take As Directed. TAKE ONE TABLET EVERY OTHER DAY, AS DIRECTED. 8/5/22  Yes Leandro Jang PA   SUMAtriptan (IMITREX) 50 MG tablet TAKE 1 TABLET BY MOUTH AT ONSET OF HEADACHE. MAY REPEAT DOSE 1 TIME IN 2 HOURS IF HEADACHE NOT RELIEVED  Patient taking differently: Take 50 mg by mouth 1 (One) Time As Needed. TAKE 1 TABLET BY MOUTH AT ONSET OF HEADACHE. MAY REPEAT DOSE 1 TIME IN 2 HOURS IF HEADACHE NOT RELIEVED 2/14/22  Yes Jeovany Pizarro APRN   levothyroxine (SYNTHROID, LEVOTHROID) 50 MCG tablet TAKE 1 TABLET BY MOUTH DAILY  Patient taking differently: Take 50 mcg by mouth Daily. 3/7/22 8/25/22 Yes Bina Carmona APRN   predniSONE (DELTASONE) 10 MG tablet 50 mg for 3 days, 40 mg for 3 days, 30 mg for 3 days, 20 mg for 3 days, 10 mg for 3 days, then D/C 8/5/22   Jang, Christopher Ray, PA       Objective     Vital Signs: /84 (BP Location: Left arm, Patient Position: Sitting, Cuff Size: Adult)   Pulse 84   Temp 97.1 °F (36.2 °C) (Temporal)   Ht  "157.5 cm (62\")   Wt 98.4 kg (217 lb)   LMP 07/25/2022   SpO2 98%   Breastfeeding No   BMI 39.69 kg/m²   Physical Exam  Vitals and nursing note reviewed.   Constitutional:       Appearance: Normal appearance. She is well-developed.   HENT:      Head: Normocephalic and atraumatic.      Right Ear: External ear normal.      Left Ear: External ear normal.      Nose: Nose normal.      Mouth/Throat:      Mouth: Mucous membranes are moist.   Eyes:      Extraocular Movements: Extraocular movements intact.      Conjunctiva/sclera: Conjunctivae normal.      Pupils: Pupils are equal, round, and reactive to light.   Neck:      Thyroid: No thyromegaly.      Vascular: No JVD.      Trachea: No tracheal deviation.   Cardiovascular:      Rate and Rhythm: Normal rate and regular rhythm.      Pulses: Normal pulses.      Heart sounds: Normal heart sounds. No murmur heard.    No friction rub. No gallop.   Pulmonary:      Effort: Pulmonary effort is normal.      Breath sounds: Normal breath sounds.   Abdominal:      General: Bowel sounds are normal. There is no distension.      Palpations: Abdomen is soft.      Tenderness: There is no abdominal tenderness.   Musculoskeletal:         General: Normal range of motion.      Cervical back: Normal range of motion and neck supple.   Lymphadenopathy:      Cervical: No cervical adenopathy.   Skin:     General: Skin is warm and dry.      Capillary Refill: Capillary refill takes less than 2 seconds.   Neurological:      Mental Status: She is alert and oriented to person, place, and time.      Cranial Nerves: No cranial nerve deficit.      Coordination: Coordination normal.   Psychiatric:         Mood and Affect: Mood normal.         Behavior: Behavior normal.         Class 2 Severe Obesity (BMI >=35 and <=39.9). Obesity-related health conditions include the following: hypertension. Obesity is worsening. BMI is is above average; BMI management plan is completed. We discussed portion control and " increasing exercise.      Results Reviewed:  Glucose   Date Value Ref Range Status   08/05/2022 90 65 - 99 mg/dL Final   11/30/2017 85 70 - 100 mg/dL Final     BUN   Date Value Ref Range Status   08/05/2022 9 6 - 20 mg/dL Final   04/11/2019 7 7 - 17 mg/dL Final     Creatinine   Date Value Ref Range Status   08/05/2022 0.76 0.57 - 1.00 mg/dL Final   04/11/2019 0.8 0.5 - 1.1 mg/dL Final     Sodium   Date Value Ref Range Status   08/05/2022 142 136 - 145 mmol/L Final   04/11/2019 138 134 - 145 mmol/L Final     Potassium   Date Value Ref Range Status   08/05/2022 4.6 3.5 - 5.2 mmol/L Final   04/11/2019 3.6 3.4 - 5.0 mmol/L Final     Chloride   Date Value Ref Range Status   08/05/2022 107 98 - 107 mmol/L Final   04/11/2019 102 98 - 107 mmol/L Final     Total CO2   Date Value Ref Range Status   08/05/2022 25.6 22.0 - 29.0 mmol/L Final   04/11/2019 25 22 - 30 mmol/L Final     Calcium   Date Value Ref Range Status   08/05/2022 9.2 8.6 - 10.5 mg/dL Final   04/11/2019 9.2 8.4 - 10.2 mg/dL Final     ALT (SGPT)   Date Value Ref Range Status   08/05/2022 20 1 - 33 U/L Final   04/11/2019 27 9 - 52 U/L Final     AST (SGOT)   Date Value Ref Range Status   08/05/2022 18 1 - 32 U/L Final   04/11/2019 27 14 - 36 U/L Final     WBC   Date Value Ref Range Status   12/06/2021 8.57 3.40 - 10.80 10*3/mm3 Final   04/11/2019 11.4 (H) 4.0 - 10.5 10*3/uL Final     Hematocrit   Date Value Ref Range Status   12/06/2021 43.7 34.0 - 46.6 % Final   04/11/2019 41.8 37.0 - 47.0 % Final     Platelets   Date Value Ref Range Status   12/06/2021 325 140 - 450 10*3/mm3 Final   04/11/2019 299 150 - 450 10*3/uL Final     Triglycerides   Date Value Ref Range Status   12/06/2021 84 0 - 150 mg/dL Final     HDL Cholesterol   Date Value Ref Range Status   12/06/2021 55 40 - 60 mg/dL Final     LDL Chol Calc (NIH)   Date Value Ref Range Status   12/06/2021 142 (H) 0 - 100 mg/dL Final     LDL/HDL Ratio   Date Value Ref Range Status   04/13/2015 2.3 0.0 - 3.4 Final      Hemoglobin A1C   Date Value Ref Range Status   11/29/2017 5.7 % Final         Assessment / Plan     Assessment/Plan:  1. Essential hypertension    - metoprolol tartrate (LOPRESSOR) 25 MG tablet; Take 1 tablet by mouth 2 (Two) Times a Day.  Dispense: 180 tablet; Refill: 3    2. Hypothyroidism, unspecified type      3. Heartburn      4. Palpitations      5. Morbid (severe) obesity due to excess calories (HCC)      6. Primary insomnia    The patient is going to monitor blood pressure at home. The goal is less than 130/80 mmHg. She is going to keep a log. She has trended at the top end of normal since the last three visits, so we do need to ascertain how she is doing in general. She will continue all of her medications and will increase her exercise. 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 She is also having some problems with sleep. I recommend trying to sit down and calm the body down prior to laying down on the bed. She can try some chamomile tea and if that fails to help she can start melatonin. She should have a CMP, a TSH, CBC, and a lipid panel done in the next visit. The influenza vaccine was discussed and I have encouraged her to get it when it is available on 10/2022. Refills on medications that were requested were done. She will follow up in 6 months.        Return in about 6 months (around 2/25/2023). unless patient needs to be seen sooner or acute issues arise.      I have discussed the patient results/orders and and plan/recommendation with them at today's visit.      Transcribed from ambient dictation for Kiana Arellano DO by Connie Bradley  08/25/22   15:25 CDT    Patient verbalized consent to the visit recording.      Kiana Arellano DO   08/25/2022

## 2022-08-28 DIAGNOSIS — G43.719 INTRACTABLE CHRONIC MIGRAINE WITHOUT AURA AND WITHOUT STATUS MIGRAINOSUS: ICD-10-CM

## 2022-08-29 RX ORDER — MEMANTINE HYDROCHLORIDE 10 MG/1
TABLET ORAL
Qty: 60 TABLET | Refills: 5 | Status: SHIPPED | OUTPATIENT
Start: 2022-08-29 | End: 2023-02-27

## 2022-10-09 DIAGNOSIS — G43.829 MENSTRUAL MIGRAINE WITHOUT STATUS MIGRAINOSUS, NOT INTRACTABLE: ICD-10-CM

## 2022-10-10 RX ORDER — LEVONORGESTREL AND ETHINYL ESTRADIOL AND ETHINYL ESTRADIOL 150-30(84)
KIT ORAL
Qty: 91 EACH | Refills: 0 | Status: SHIPPED | OUTPATIENT
Start: 2022-10-10 | End: 2022-12-08 | Stop reason: SDUPTHER

## 2022-10-30 DIAGNOSIS — G43.719 INTRACTABLE CHRONIC MIGRAINE WITHOUT AURA AND WITHOUT STATUS MIGRAINOSUS: ICD-10-CM

## 2022-10-31 RX ORDER — NAPROXEN 500 MG/1
TABLET ORAL
Qty: 60 TABLET | Refills: 1 | Status: SHIPPED | OUTPATIENT
Start: 2022-10-31

## 2022-12-08 ENCOUNTER — OFFICE VISIT (OUTPATIENT)
Dept: OBSTETRICS AND GYNECOLOGY | Facility: CLINIC | Age: 42
End: 2022-12-08

## 2022-12-08 VITALS
HEIGHT: 62 IN | DIASTOLIC BLOOD PRESSURE: 88 MMHG | SYSTOLIC BLOOD PRESSURE: 128 MMHG | BODY MASS INDEX: 41.22 KG/M2 | WEIGHT: 224 LBS

## 2022-12-08 DIAGNOSIS — Z12.31 ENCOUNTER FOR SCREENING MAMMOGRAM FOR BREAST CANCER: ICD-10-CM

## 2022-12-08 DIAGNOSIS — G43.829 MENSTRUAL MIGRAINE WITHOUT STATUS MIGRAINOSUS, NOT INTRACTABLE: ICD-10-CM

## 2022-12-08 DIAGNOSIS — Z12.4 ENCOUNTER FOR SCREENING FOR CERVICAL CANCER: ICD-10-CM

## 2022-12-08 DIAGNOSIS — Z01.419 ENCOUNTER FOR WELL WOMAN EXAM WITH ROUTINE GYNECOLOGICAL EXAM: Primary | ICD-10-CM

## 2022-12-08 PROCEDURE — 87624 HPV HI-RISK TYP POOLED RSLT: CPT | Performed by: NURSE PRACTITIONER

## 2022-12-08 PROCEDURE — G0123 SCREEN CERV/VAG THIN LAYER: HCPCS | Performed by: NURSE PRACTITIONER

## 2022-12-08 PROCEDURE — 99213 OFFICE O/P EST LOW 20 MIN: CPT | Performed by: NURSE PRACTITIONER

## 2022-12-08 PROCEDURE — 99396 PREV VISIT EST AGE 40-64: CPT | Performed by: NURSE PRACTITIONER

## 2022-12-08 RX ORDER — LEVONORGESTREL / ETHINYL ESTRADIOL AND ETHINYL ESTRADIOL 150-30(84)
1 KIT ORAL DAILY
Qty: 91 EACH | Refills: 0 | Status: SHIPPED | OUTPATIENT
Start: 2022-12-08 | End: 2022-12-08

## 2022-12-08 RX ORDER — LEVONORGESTREL / ETHINYL ESTRADIOL AND ETHINYL ESTRADIOL 150-30(84)
1 KIT ORAL DAILY
Qty: 91 EACH | Refills: 3 | Status: SHIPPED | OUTPATIENT
Start: 2022-12-08

## 2022-12-08 NOTE — PROGRESS NOTES
Subjective   Talita English is a 42 y.o. female  YOB: 1980        Chief Complaint   Patient presents with   • Gynecologic Exam     Pt here for annual, last pap 21, last mammogram 22, pt denies any problems or concerns.    • Med Refill     Pt here for OTP refill as well        Gynecologic Exam  The patient's pertinent negatives include no pelvic pain. Pertinent negatives include no abdominal pain, back pain, constipation, diarrhea, dysuria, fever, frequency, hematuria, nausea, rash, sore throat, urgency or vomiting.       The following portions of the patient's history were reviewed and updated as appropriate: allergies, current medications, past family history, past medical history, past social history, past surgical history, and problem list.    Allergies   Allergen Reactions   • Codeine Nausea And Vomiting   • Elavil [Amitriptyline Hcl] Rash   • Maxalt [Rizatriptan Benzoate] Nausea Only   • Relpax [Eletriptan] Nausea Only       Past Medical History:   Diagnosis Date   • Anemia 2018   • Disease of thyroid gland     hypothyroid   • Fibromyalgia    • GERD (gastroesophageal reflux disease)    • Hypertension    • Migraines    • Ovarian cyst    • PONV (postoperative nausea and vomiting)    • Urinary tract infection    • Varicella        Family History   Problem Relation Age of Onset   • Hypertension Mother    • Melanoma Mother    • Cancer Father         bladder   • Heart attack Father    • Heart disease Father    • Arrhythmia Father    • Abnormal EKG Father    • Colon cancer Paternal Uncle    • Migraines Brother    • Hypertension Brother    • Heart attack Sister    • Hypertension Sister    • Heart disease Maternal Grandmother    • Heart attack Maternal Grandfather    • Heart attack Paternal Grandmother    • Aneurysm Paternal Grandfather    • Stroke Paternal Grandfather          of Brain Aneurysm   • Diabetes Sister    • Hypertension Sister    • Colon polyps Neg Hx    • Seizures  Neg Hx    • Breast cancer Neg Hx    • Ovarian cancer Neg Hx    • Uterine cancer Neg Hx        Social History     Socioeconomic History   • Marital status:    Tobacco Use   • Smoking status: Never   • Smokeless tobacco: Never   Substance and Sexual Activity   • Alcohol use: Yes     Alcohol/week: 1.0 standard drink     Types: 1 Glasses of wine per week     Comment: rarely   • Drug use: No   • Sexual activity: Yes     Partners: Male     Birth control/protection: Pill, Birth control pill         Current Outpatient Medications:   •  cyclobenzaprine (FLEXERIL) 10 MG tablet, Take 1 tablet by mouth 3 (Three) Times a Day As Needed for Muscle Spasms., Disp: 90 tablet, Rfl: 2  •  Erenumab-aooe (Aimovig) 140 MG/ML prefilled syringe, Inject 1 mL under the skin into the appropriate area as directed Every 30 (Thirty) Days., Disp: 1 pen, Rfl: 11  •  Levonorgest-Eth Estrad 91-Day (Simpesse) 0.15-0.03 &0.01 MG tablet, Take 1 tablet by mouth Daily., Disp: 91 each, Rfl: 3  •  levothyroxine (SYNTHROID, LEVOTHROID) 50 MCG tablet, Take 1 tablet by mouth Daily., Disp: 90 tablet, Rfl: 3  •  memantine (NAMENDA) 10 MG tablet, TAKE 1 TABLET BY MOUTH TWICE DAILY, Disp: 60 tablet, Rfl: 5  •  metoprolol tartrate (LOPRESSOR) 25 MG tablet, Take 1 tablet by mouth 2 (Two) Times a Day., Disp: 180 tablet, Rfl: 3  •  naproxen (NAPROSYN) 500 MG tablet, TAKE 1 TABLET BY MOUTH TWICE DAILY AS NEEDED FOR MODERATE PAIN OR HEADACHE, Disp: 60 tablet, Rfl: 1  •  Rimegepant Sulfate (NURTEC) 75 MG tablet dispersible tablet, Take 1 tablet by mouth Take As Directed. TAKE ONE TABLET EVERY OTHER DAY, AS DIRECTED., Disp: 16 tablet, Rfl: 11  •  levETIRAcetam (KEPPRA) 500 MG tablet, TAKE 1 TABLET BY MOUTH EVERY MORNING AND 2 TABLETS EVERY EVENING, Disp: 90 tablet, Rfl: 2  •  SUMAtriptan (IMITREX) 50 MG tablet, Take 1 tablet at onset of migraine, may repeat in 2 hours if needed.  Not to exceed 2 doses in 24 hours., Disp: 9 tablet, Rfl: 1    Patient's last menstrual  period was 2022.    Sexual History:           Could not be calculated    Past Surgical History:   Procedure Laterality Date   •  SECTION     • ENDOSCOPY N/A 2019    Normal 1st and 2nd portions of the duodenum-biopsied; Normal stomach; LA grade A esophagitis; No esophageal abnormality to explain patient's dysphagia-esophagus dilated        Review of Systems   Constitutional: Negative for activity change, appetite change, fatigue, fever, unexpected weight gain and unexpected weight loss.   HENT: Negative for congestion, ear pain, hearing loss, nosebleeds, rhinorrhea, sore throat, tinnitus and trouble swallowing.    Eyes: Negative for blurred vision, pain, discharge, itching and visual disturbance.   Respiratory: Negative for apnea, chest tightness, shortness of breath and wheezing.    Cardiovascular: Negative for chest pain and leg swelling.   Gastrointestinal: Negative for abdominal pain, blood in stool, constipation, diarrhea, nausea, vomiting and GERD.   Endocrine: Negative for heat intolerance, polydipsia and polyuria.   Genitourinary: Negative.  Negative for breast lump, decreased libido, difficulty urinating, dyspareunia, dysuria, frequency, genital sores, hematuria, menstrual problem, pelvic pain, urgency, urinary incontinence and vaginal pain.   Musculoskeletal: Negative for arthralgias, back pain, joint swelling and myalgias.   Skin: Negative for color change, rash and skin lesions.   Allergic/Immunologic: Negative for environmental allergies, food allergies and immunocompromised state.   Neurological: Negative for dizziness, tremors, seizures, syncope, facial asymmetry, numbness and headache.   Hematological: Negative for adenopathy. Does not bruise/bleed easily.   Psychiatric/Behavioral: Negative for agitation, hallucinations, sleep disturbance, suicidal ideas and depressed mood. The patient is not nervous/anxious.        Objective   Physical Exam  Vitals reviewed.   Constitutional:        General: She is not in acute distress.     Appearance: She is well-developed. She is not ill-appearing.   HENT:      Head: Normocephalic.      Right Ear: External ear normal.      Left Ear: External ear normal.      Nose: Nose normal.      Mouth/Throat:      Pharynx: No oropharyngeal exudate.   Eyes:      General: No scleral icterus.        Right eye: No discharge.         Left eye: No discharge.      Conjunctiva/sclera: Conjunctivae normal.      Pupils: Pupils are equal, round, and reactive to light.   Neck:      Thyroid: No thyroid mass or thyromegaly.   Cardiovascular:      Rate and Rhythm: Normal rate and regular rhythm.      Heart sounds: Normal heart sounds. No murmur heard.  Pulmonary:      Effort: Pulmonary effort is normal. No respiratory distress.      Breath sounds: Normal breath sounds. No wheezing or rales.   Chest:      Chest wall: No tenderness.   Abdominal:      General: Bowel sounds are normal. There is no distension.      Palpations: Abdomen is soft. There is no mass.      Tenderness: There is no abdominal tenderness. There is no guarding or rebound.      Hernia: No hernia is present.   Genitourinary:     Exam position: Prone.      Labia:         Right: No rash, tenderness or lesion.         Left: No rash, tenderness, lesion or injury.       Vagina: Normal. No vaginal discharge or tenderness.      Cervix: No cervical motion tenderness, discharge or friability.      Uterus: Not enlarged and not tender.       Adnexa:         Right: No mass or tenderness.          Left: No mass or tenderness.        Comments: Urethra and urethral meatus normal.    Bladder - normal, no prolapse.  Perineum and rectum examined - intact and no lesions.    Musculoskeletal:         General: No tenderness or deformity. Normal range of motion.      Cervical back: Normal range of motion and neck supple.   Lymphadenopathy:      Cervical: No cervical adenopathy.   Skin:     General: Skin is warm and dry.      Coloration: Skin is  "not pale.      Findings: No erythema or rash.   Neurological:      Mental Status: She is alert and oriented to person, place, and time.      Motor: No abnormal muscle tone.      Coordination: Coordination normal.      Deep Tendon Reflexes: Reflexes are normal and symmetric.   Psychiatric:         Behavior: Behavior normal. Behavior is cooperative.         Thought Content: Thought content normal.         Judgment: Judgment normal.           Vitals:    12/08/22 0918   BP: 128/88   BP Location: Right arm   Patient Position: Sitting   Cuff Size: Large Adult   Weight: 102 kg (224 lb)   Height: 157.5 cm (62\")       Diagnoses and all orders for this visit:    1. Encounter for well woman exam with routine gynecological exam (Primary)  Comments:  Normal well woman exam.  ThinPrep Pap smear done.  Mammogram ordered.    2. Encounter for screening for cervical cancer  Comments:  ThinPrep Pap smear done.  Orders:  -     Liquid-based Pap Smear, Screening  -     HPV DNA Probe, Direct - ThinPrep Vial, Cervix    3. Encounter for screening mammogram for breast cancer  Comments:  Mammogram ordered.  Orders:  -     Mammo screening digital tomosynthesis bilateral w CAD    4. Menstrual migraine without status migrainosus, not intractable  Comments:  Patient is on Vera for both birth control and menstrual migraines.  Doing well on it.  Refill sent to pharmacy.  Orders:  -     Discontinue: Levonorgest-Eth Estrad 91-Day (Simpesse) 0.15-0.03 &0.01 MG tablet; Take 1 tablet by mouth Daily.  Dispense: 91 each; Refill: 0  -     Levonorgest-Eth Estrad 91-Day (Simpesse) 0.15-0.03 &0.01 MG tablet; Take 1 tablet by mouth Daily.  Dispense: 91 each; Refill: 3        Normal GYN exam. Will have lab work here. Encouraged SBE.  Pt is aware how to do self breast exam and the importance of same. Discussed weight management and importance of maintaining a healthy weight. Discussed Vitamin D intake and the importance of adequate vitamin D for both bone " health and a healthy immune system.  Discussed daily exercise and the importance of same in regards to a healthy heart as well as helping to maintain her weight and improving her mental health.  Body mass index is 40.97 kg/m². Colonoscopy is not age appropriate.  Mammogram will be scheduled at [facility]. Pap smear is done per ASCCP guidelines.    Class 3 Severe Obesity (BMI >=40). Obesity-related health conditions include the following: none. Obesity is unchanged. BMI is is above average; BMI management plan is completed. We discussed portion control and increasing exercise.             Non-Smoker    MyChart Instructions Given

## 2022-12-09 LAB
GEN CATEG CVX/VAG CYTO-IMP: NORMAL
HPV I/H RISK 4 DNA CVX QL PROBE+SIG AMP: NOT DETECTED
LAB AP CASE REPORT: NORMAL
LAB AP GYN ADDITIONAL INFORMATION: NORMAL
Lab: NORMAL
PATH INTERP SPEC-IMP: NORMAL
STAT OF ADQ CVX/VAG CYTO-IMP: NORMAL

## 2022-12-11 DIAGNOSIS — G43.719 INTRACTABLE CHRONIC MIGRAINE WITHOUT AURA AND WITHOUT STATUS MIGRAINOSUS: ICD-10-CM

## 2022-12-11 DIAGNOSIS — M62.838 MUSCLE SPASMS OF NECK: ICD-10-CM

## 2022-12-12 RX ORDER — CYCLOBENZAPRINE HCL 10 MG
10 TABLET ORAL 3 TIMES DAILY PRN
Qty: 90 TABLET | Refills: 2 | Status: SHIPPED | OUTPATIENT
Start: 2022-12-12

## 2022-12-12 RX ORDER — LEVETIRACETAM 500 MG/1
TABLET ORAL
Qty: 90 TABLET | Refills: 2 | Status: SHIPPED | OUTPATIENT
Start: 2022-12-12 | End: 2023-03-06

## 2022-12-13 ENCOUNTER — TELEPHONE (OUTPATIENT)
Dept: NEUROLOGY | Facility: CLINIC | Age: 42
End: 2022-12-13

## 2022-12-13 DIAGNOSIS — G43.719 INTRACTABLE CHRONIC MIGRAINE WITHOUT AURA AND WITHOUT STATUS MIGRAINOSUS: ICD-10-CM

## 2022-12-13 RX ORDER — SUMATRIPTAN 50 MG/1
TABLET, FILM COATED ORAL
Qty: 9 TABLET | Refills: 1 | Status: SHIPPED | OUTPATIENT
Start: 2022-12-13

## 2022-12-13 NOTE — TELEPHONE ENCOUNTER
Provider: DANNY  Caller: BHARTI  Relationship to Patient: AUGUST  Phone Number: [phone]  Reason for Call: AUGUST HAD FAXED A FORM OVER TO THE OFFICE AND BHARTI IS CHECKING TO MAKE SURE THE OFFICE HAS RECEIVED THIS FAX     REFERENCE KEY# [id number]    IF ANY ASSISTANCE IS NEEDED PLEASE CALL [employer] AT    PHONE#  [phone]    THANK YOU

## 2022-12-13 NOTE — TELEPHONE ENCOUNTER
Caller: JONAH Blanca call back number: [phone]    Requested Prescriptions:     NURTE  WAS TOLD NEEDS P.A.?THEY FAXED OVER FORM     IMITREX NEED SCRIPT WAS TOLD AT PHARMACY IT WAS DELETED FROM MED LIST?   Requested Prescriptions      No prescriptions requested or ordered in this encounter        [id number] [address] AT [facility] - [phone]  - [phone]   [phone]:      Additional details provided by patient: PT WAS TOLD NEED NEW SCRIPT AND P.A. AS WELL    Does the patient have less than a 3 day supply:  [] Yes  [x] No    Would you like a call back once the refill request has been completed: [x] Yes [] No    If the office needs to give you a call back, can they leave a voicemail: [x] Yes [] No    PLEASE ADVISE.     Honey Cruz Rep   12/13/22 11:43 CST

## 2022-12-13 NOTE — TELEPHONE ENCOUNTER
Explained that [facility] has a script for Nurtec, they said it needs a PA.  Message sent to MA about PA.  She would like Imitrex refilled.

## 2023-02-26 DIAGNOSIS — G43.719 INTRACTABLE CHRONIC MIGRAINE WITHOUT AURA AND WITHOUT STATUS MIGRAINOSUS: ICD-10-CM

## 2023-02-27 RX ORDER — MEMANTINE HYDROCHLORIDE 10 MG/1
TABLET ORAL
Qty: 60 TABLET | Refills: 0 | Status: SHIPPED | OUTPATIENT
Start: 2023-02-27 | End: 2023-03-27

## 2023-03-02 ENCOUNTER — OFFICE VISIT (OUTPATIENT)
Dept: INTERNAL MEDICINE | Facility: CLINIC | Age: 43
End: 2023-03-02
Payer: COMMERCIAL

## 2023-03-02 VITALS
DIASTOLIC BLOOD PRESSURE: 70 MMHG | HEIGHT: 62 IN | OXYGEN SATURATION: 99 % | HEART RATE: 75 BPM | TEMPERATURE: 97.6 F | SYSTOLIC BLOOD PRESSURE: 122 MMHG | BODY MASS INDEX: 41.22 KG/M2 | WEIGHT: 224 LBS

## 2023-03-02 DIAGNOSIS — E66.01 MORBID OBESITY: ICD-10-CM

## 2023-03-02 DIAGNOSIS — E03.9 HYPOTHYROIDISM, UNSPECIFIED TYPE: ICD-10-CM

## 2023-03-02 DIAGNOSIS — R10.13 EPIGASTRIC PAIN: ICD-10-CM

## 2023-03-02 DIAGNOSIS — R00.2 PALPITATIONS: ICD-10-CM

## 2023-03-02 DIAGNOSIS — J30.1 SEASONAL ALLERGIC RHINITIS DUE TO POLLEN: ICD-10-CM

## 2023-03-02 DIAGNOSIS — G43.719 INTRACTABLE CHRONIC MIGRAINE WITHOUT AURA AND WITHOUT STATUS MIGRAINOSUS: Primary | ICD-10-CM

## 2023-03-02 DIAGNOSIS — I10 ESSENTIAL HYPERTENSION: ICD-10-CM

## 2023-03-02 PROBLEM — R12 HEARTBURN: Status: RESOLVED | Noted: 2019-01-11 | Resolved: 2023-03-02

## 2023-03-02 PROBLEM — R51.9 INTRACTABLE HEADACHE: Status: RESOLVED | Noted: 2017-11-29 | Resolved: 2023-03-02

## 2023-03-02 PROBLEM — R13.19 OTHER DYSPHAGIA: Status: RESOLVED | Noted: 2019-01-11 | Resolved: 2023-03-02

## 2023-03-02 PROCEDURE — 99214 OFFICE O/P EST MOD 30 MIN: CPT | Performed by: INTERNAL MEDICINE

## 2023-03-02 RX ORDER — FLUTICASONE PROPIONATE 50 MCG
2 SPRAY, SUSPENSION (ML) NASAL DAILY
Qty: 11.1 ML | Refills: 2 | Status: SHIPPED | OUTPATIENT
Start: 2023-03-02

## 2023-03-02 NOTE — PROGRESS NOTES
"      Chief Complaint  Hypertension    Subjective        Talita English presents to [facility]    HPI    Patient seen for follow-up for hypertension and her hypothyroidism.  Patient overall is doing well in regards to this.  Patient did have an episode of chest pain and palpitations.  She felt as though she could not take a deep breath.  This only lasted for a couple hours and then resolved.  Patient has had no residual symptoms and no repeat symptoms.  Discussed with her that if this occurs again, given her history of being on birth control as well as her weight, she would be at risk for a blood clot.  Patient is having no residual symptoms at this time.  Patient's migraines are decently well controlled at the present time, she indicates that this has been the best medication regimen that she has been on, but they are still present at times.  Patient has tried Botox in the past.    Review of Systems  Answers for HPI/ROS submitted by the patient on 3/1/2023  Please describe your symptoms.: Routine follow up for thyroid and blood pressure  Have you had these symptoms before?: Yes  How long have you been having these symptoms?: Greater than 2 weeks  Please list any medications you are currently taking for this condition.: Synthroid and metroprolol? See chart  What is the primary reason for your visit?: Other      Objective   Vital Signs:  /70 (BP Location: Left arm, Patient Position: Sitting, Cuff Size: Adult)   Pulse 75   Temp 97.6 °F (36.4 °C) (Temporal)   Ht 157.5 cm (62\")   Wt 102 kg (224 lb)   SpO2 99%   BMI 40.97 kg/m²   Estimated body mass index is 40.97 kg/m² as calculated from the following:    Height as of this encounter: 157.5 cm (62\").    Weight as of this encounter: 102 kg (224 lb).      Physical Exam  Vitals reviewed.   Constitutional:       Appearance: She is not ill-appearing.   HENT:      Head: Normocephalic and atraumatic.      Mouth/Throat:      Mouth: " Mucous membranes are dry.      Pharynx: Oropharynx is clear.   Eyes:      General: No scleral icterus.     Conjunctiva/sclera: Conjunctivae normal.   Cardiovascular:      Rate and Rhythm: Normal rate and regular rhythm.   Pulmonary:      Effort: Pulmonary effort is normal. No respiratory distress.   Skin:     General: Skin is warm and dry.   Neurological:      General: No focal deficit present.      Mental Status: She is alert and oriented to person, place, and time.   Psychiatric:         Mood and Affect: Mood normal.         Behavior: Behavior normal.                       Assessment and Plan   Diagnoses and all orders for this visit:    1. Intractable chronic migraine without aura and without status migrainosus (Primary)    2. Essential hypertension    3. Hypothyroidism, unspecified type    4. Seasonal allergic rhinitis due to pollen  -     fluticasone (FLONASE) 50 MCG/ACT nasal spray; 2 sprays into the nostril(s) as directed by provider Daily.  Dispense: 11.1 mL; Refill: 2    5. Epigastric pain    6. Palpitations    7. Morbid obesity (HCC)      Patient follows with neurology for her migraines.  She follows with Fahad Jang.  Patient is not currently experiencing symptoms and is doing okay.    Patient's blood pressure is well controlled.  Patient did have the episode of chest pain, difficulty taking deep breath.  Discussed with her letting us know or presenting to the local ER if it occurs again.  Patient reports that she had something similar in the past, went back and looked and she had a cardiac work-up back in 2019.  At that time she had a Holter monitor that did show some episodes of short bursts of SVT, she had a stress test that was overall low risk.  Patient not currently have any palpitations or chest pain.    Patient has significant allergic rhinitis, requested Flonase to start before seasonal allergies begin.  She can also consider adding a Zyrtec daily if needed.    We discussed portion control and  weight loss.  Patient is active, but does have some difficulty losing weight.  At present, I would encourage the patient to focus on portion control and increasing activity.  We could consider weight loss medications in the future since her BMI is over 40 at the present time.  Given her migraine history and medications, we would want to really focus on ensuring there are no medication interactions        Result Review :  The following data was reviewed by: Grant Canela MD on 03/02/2023:  CMP    CMP 8/5/22   Glucose 90   BUN 9   Creatinine 0.76   Sodium 142   Potassium 4.6   Chloride 107   Calcium 9.2   Total Protein 6.4   Albumin 4.00   Globulin 2.4   Total Bilirubin 0.5   Alkaline Phosphatase 66   AST (SGOT) 18   ALT (SGPT) 20   BUN/Creatinine Ratio 11.8                     Data reviewed: I reviewed patient's cardiac work-up from 2019 including Holter monitor, stress test, and EKG.  Work-up noted as above.     Class 3 Severe Obesity (BMI >=40). Obesity-related health conditions include the following: hypertension. Obesity is unchanged. BMI is is above average; BMI management plan is completed. We discussed portion control and increasing exercise.           Follow Up   Return in about 6 months (around 9/2/2023), or if symptoms worsen or fail to improve, for Recheck.  Patient was given instructions and counseling regarding her condition or for health maintenance advice. Please see specific information pulled into the AVS if appropriate.       KENJI Canela MD, FACP, [facility]      Electronically signed by Grant Canela MD, 03/02/23, 5:15 PM CST.

## 2023-03-05 DIAGNOSIS — G43.719 INTRACTABLE CHRONIC MIGRAINE WITHOUT AURA AND WITHOUT STATUS MIGRAINOSUS: ICD-10-CM

## 2023-03-06 RX ORDER — LEVETIRACETAM 500 MG/1
TABLET ORAL
Qty: 90 TABLET | Refills: 0 | Status: SHIPPED | OUTPATIENT
Start: 2023-03-06

## 2023-03-09 ENCOUNTER — TELEPHONE (OUTPATIENT)
Dept: NEUROLOGY | Facility: CLINIC | Age: 43
End: 2023-03-09
Payer: COMMERCIAL

## 2023-03-09 NOTE — TELEPHONE ENCOUNTER
Caller: Talita English    Relationship to patient: Self    Best call back number: [phone]    Chief complaint: PROVIDER OUT SICK TOMORROW AND PT WAS R/S YESTERDAY FOR 03/27/23 BUTYPT STATED SHE HAS TO REARRANGE HER PT AND CANT DO A Monday. PT IS REQUESTING A CALL BACK.     Type of visit: F/U    Requested date: Tuesday OR Thursday MORNING     If rescheduling, when is the original appointment: 3/10/23 WAS R/S TO 3/27/23      PLEASE REVIEW AND ADVISE     THANK YOU

## 2023-03-10 ENCOUNTER — HOSPITAL ENCOUNTER (OUTPATIENT)
Dept: MAMMOGRAPHY | Facility: HOSPITAL | Age: 43
Discharge: HOME | End: 2023-03-10
Admitting: NURSE PRACTITIONER
Payer: COMMERCIAL

## 2023-03-10 PROCEDURE — 77067 SCR MAMMO BI INCL CAD: CPT

## 2023-03-10 PROCEDURE — 77063 BREAST TOMOSYNTHESIS BI: CPT

## 2023-03-26 DIAGNOSIS — G43.719 INTRACTABLE CHRONIC MIGRAINE WITHOUT AURA AND WITHOUT STATUS MIGRAINOSUS: ICD-10-CM

## 2023-03-27 RX ORDER — MEMANTINE HYDROCHLORIDE 10 MG/1
TABLET ORAL
Qty: 60 TABLET | Refills: 0 | Status: SHIPPED | OUTPATIENT
Start: 2023-03-27

## 2023-03-28 ENCOUNTER — TELEPHONE (OUTPATIENT)
Dept: INTERNAL MEDICINE | Facility: CLINIC | Age: 43
End: 2023-03-28
Payer: COMMERCIAL

## 2023-03-28 DIAGNOSIS — R39.9 UTI SYMPTOMS: ICD-10-CM

## 2023-03-28 DIAGNOSIS — R39.9 UTI SYMPTOMS: Primary | ICD-10-CM

## 2023-03-28 RX ORDER — NITROFURANTOIN 25; 75 MG/1; MG/1
100 CAPSULE ORAL 2 TIMES DAILY
Qty: 10 CAPSULE | Refills: 0 | Status: SHIPPED | OUTPATIENT
Start: 2023-03-28

## 2023-03-28 NOTE — TELEPHONE ENCOUNTER
Pt states previously she has been able just to get meds called in. She lives about 1.5 hrs away and her work wouldn't let her off. She would have to cancel all their patients.

## 2023-03-28 NOTE — TELEPHONE ENCOUNTER
Caller: Talita English    Relationship: Self    Best call back number:   [phone]    What medication are you requesting:  SOMETHING FOR UTI    What are your current symptoms:  FREQUENCY OF URINATION STARTED YESTERDAY & PAIN IN LOWER BACK & WITH URINATION STARTED EARLY THIS MORNING    How long have you been experiencing symptoms:  SINCE 3/27/23 (YESTERDAY)    Have you had these symptoms before:    [] Yes  [] No    Have you been treated for these symptoms before:   [] Yes  [] No    If a prescription is needed, what is your preferred pharmacy and phone number: [facility] #[id number] - [place], [place] - [address] [place] [address] AT [address] - [phone] [facility] [phone] FX

## 2023-03-28 NOTE — TELEPHONE ENCOUNTER
Called pt and discussed - will send order to [facility] in [place] today and she understands that once urine is collected, she can start on her antibiotic.  Order faxed to [phone].

## 2023-04-04 ENCOUNTER — TELEPHONE (OUTPATIENT)
Dept: INTERNAL MEDICINE | Facility: CLINIC | Age: 43
End: 2023-04-04
Payer: COMMERCIAL

## 2023-04-04 NOTE — TELEPHONE ENCOUNTER
Called [facility], after they sent us urine culture results and no urinalysis results and after further investigation, a urinalysis was not performed, though was on the order.

## 2023-04-10 DIAGNOSIS — G43.719 INTRACTABLE CHRONIC MIGRAINE WITHOUT AURA AND WITHOUT STATUS MIGRAINOSUS: ICD-10-CM

## 2023-04-10 RX ORDER — LEVETIRACETAM 500 MG/1
TABLET ORAL
Qty: 90 TABLET | Refills: 0 | Status: SHIPPED | OUTPATIENT
Start: 2023-04-10

## 2023-04-30 DIAGNOSIS — G43.719 INTRACTABLE CHRONIC MIGRAINE WITHOUT AURA AND WITHOUT STATUS MIGRAINOSUS: ICD-10-CM

## 2023-05-01 RX ORDER — MEMANTINE HYDROCHLORIDE 10 MG/1
TABLET ORAL
Qty: 60 TABLET | Refills: 0 | Status: SHIPPED | OUTPATIENT
Start: 2023-05-01

## 2023-05-07 DIAGNOSIS — M62.838 MUSCLE SPASMS OF NECK: ICD-10-CM

## 2023-05-07 DIAGNOSIS — G43.719 INTRACTABLE CHRONIC MIGRAINE WITHOUT AURA AND WITHOUT STATUS MIGRAINOSUS: ICD-10-CM

## 2023-05-08 RX ORDER — LEVETIRACETAM 500 MG/1
TABLET ORAL
Qty: 90 TABLET | Refills: 0 | Status: SHIPPED | OUTPATIENT
Start: 2023-05-08

## 2023-05-08 RX ORDER — CYCLOBENZAPRINE HCL 10 MG
TABLET ORAL
Qty: 90 TABLET | Refills: 2 | Status: SHIPPED | OUTPATIENT
Start: 2023-05-08

## 2023-06-04 DIAGNOSIS — G43.719 INTRACTABLE CHRONIC MIGRAINE WITHOUT AURA AND WITHOUT STATUS MIGRAINOSUS: ICD-10-CM

## 2023-06-05 RX ORDER — LEVETIRACETAM 500 MG/1
TABLET ORAL
Qty: 90 TABLET | Refills: 2 | Status: SHIPPED | OUTPATIENT
Start: 2023-06-05

## 2023-06-08 ENCOUNTER — OFFICE VISIT (OUTPATIENT)
Dept: NEUROLOGY | Facility: CLINIC | Age: 43
End: 2023-06-08
Payer: COMMERCIAL

## 2023-06-08 ENCOUNTER — OFFICE VISIT (OUTPATIENT)
Dept: INTERNAL MEDICINE | Facility: CLINIC | Age: 43
End: 2023-06-08
Payer: COMMERCIAL

## 2023-06-08 VITALS
WEIGHT: 224 LBS | TEMPERATURE: 97.6 F | HEIGHT: 62 IN | HEART RATE: 96 BPM | DIASTOLIC BLOOD PRESSURE: 90 MMHG | OXYGEN SATURATION: 99 % | SYSTOLIC BLOOD PRESSURE: 130 MMHG | BODY MASS INDEX: 41.22 KG/M2

## 2023-06-08 VITALS
DIASTOLIC BLOOD PRESSURE: 84 MMHG | SYSTOLIC BLOOD PRESSURE: 132 MMHG | HEART RATE: 90 BPM | WEIGHT: 230 LBS | HEIGHT: 62 IN | OXYGEN SATURATION: 97 % | BODY MASS INDEX: 42.33 KG/M2

## 2023-06-08 DIAGNOSIS — J30.1 SEASONAL ALLERGIC RHINITIS DUE TO POLLEN: ICD-10-CM

## 2023-06-08 DIAGNOSIS — J02.0 STREP PHARYNGITIS: Primary | ICD-10-CM

## 2023-06-08 DIAGNOSIS — G43.719 INTRACTABLE CHRONIC MIGRAINE WITHOUT AURA AND WITHOUT STATUS MIGRAINOSUS: ICD-10-CM

## 2023-06-08 DIAGNOSIS — L73.9 FOLLICULITIS: ICD-10-CM

## 2023-06-08 PROCEDURE — 99213 OFFICE O/P EST LOW 20 MIN: CPT | Performed by: INTERNAL MEDICINE

## 2023-06-08 PROCEDURE — 99214 OFFICE O/P EST MOD 30 MIN: CPT | Performed by: PHYSICIAN ASSISTANT

## 2023-06-08 RX ORDER — SUMATRIPTAN 50 MG/1
TABLET, FILM COATED ORAL
Qty: 9 TABLET | Refills: 1 | Status: SHIPPED | OUTPATIENT
Start: 2023-06-08

## 2023-06-08 RX ORDER — AMOXICILLIN AND CLAVULANATE POTASSIUM 875; 125 MG/1; MG/1
1 TABLET, FILM COATED ORAL 2 TIMES DAILY
Qty: 14 TABLET | Refills: 0 | Status: SHIPPED | OUTPATIENT
Start: 2023-06-08

## 2023-06-08 RX ORDER — MEMANTINE HYDROCHLORIDE 10 MG/1
10 TABLET ORAL 2 TIMES DAILY
Qty: 60 TABLET | Refills: 6 | Status: SHIPPED | OUTPATIENT
Start: 2023-06-08

## 2023-06-08 NOTE — PROGRESS NOTES
"      Chief Complaint  Sore Throat (Sore throat, white patches /Started Sunday /Has had low grade temp )    Subjective        Talita English presents to [facility]    HPI  Patient seen for above problems.      Review of Systems   HENT:  Positive for congestion, sore throat and trouble swallowing. Negative for drooling, ear discharge and ear pain.    Respiratory:  Positive for cough, shortness of breath and stridor.    Gastrointestinal:  Negative for abdominal pain, diarrhea and vomiting.   Musculoskeletal:  Negative for neck pain.   Neurological:  Positive for headaches.     Objective   Vital Signs:  /90 (BP Location: Left arm, Patient Position: Sitting, Cuff Size: Adult)   Pulse 96   Temp 97.6 °F (36.4 °C) (Temporal)   Ht 157.5 cm (62\")   Wt 102 kg (224 lb)   SpO2 99%   BMI 40.97 kg/m²   Estimated body mass index is 40.97 kg/m² as calculated from the following:    Height as of this encounter: 157.5 cm (62\").    Weight as of this encounter: 102 kg (224 lb).      Physical Exam  Vitals reviewed.   Constitutional:       Appearance: She is not ill-appearing.   HENT:      Right Ear: Tympanic membrane normal.      Left Ear: Tympanic membrane normal.      Nose:      Right Turbinates: Enlarged.      Left Turbinates: Enlarged.      Mouth/Throat:      Mouth: Mucous membranes are moist.      Pharynx: Pharyngeal swelling, oropharyngeal exudate and posterior oropharyngeal erythema present.      Tonsils: Tonsillar exudate present. No tonsillar abscesses.   Eyes:      General: No scleral icterus.     Conjunctiva/sclera: Conjunctivae normal.   Cardiovascular:      Rate and Rhythm: Normal rate and regular rhythm.   Pulmonary:      Effort: Pulmonary effort is normal.      Breath sounds: Normal breath sounds.   Skin:     General: Skin is warm and dry.      Coloration: Skin is not pale.   Neurological:      General: No focal deficit present.      Mental Status: She is alert and oriented to " person, place, and time.   Psychiatric:         Mood and Affect: Mood normal.         Behavior: Behavior normal.                   Assessment and Plan   Diagnoses and all orders for this visit:    1. Strep pharyngitis (Primary)    2. Folliculitis    3. Seasonal allergic rhinitis due to pollen    Other orders  -     amoxicillin-clavulanate (AUGMENTIN) 875-125 MG per tablet; Take 1 tablet by mouth 2 (Two) Times a Day.  Dispense: 14 tablet; Refill: 0      Left thigh has an area of folliculiitis or issues related to friction.  Abx as above.  Warm compress twice daily. Antibiotic cream.    Strep pharngitis, augmentin as above.    OTC decongestants.  Supportive care.     Result Review :               Strep Negative but clinical suspicion high for bacterial pharyngitis.  May be different strand or bacteria.  Do not suspect viral at present given amount of exudates.  Cough goes against strep often.           Follow Up   Return if symptoms worsen or fail to improve, for Next scheduled follow up.  Patient was given instructions and counseling regarding her condition or for health maintenance advice. Please see specific information pulled into the AVS if appropriate.       KENJI Canela MD, FACP, [facility]      Electronically signed by Grant Canela MD, 06/08/23, 12:53 PM CDT.

## 2023-06-08 NOTE — PROGRESS NOTES
"Subjective   Talita English is a 43 y.o. female who presents for follow-up for migraine. The patient is on an extensive regimen for control of migraine. Fortunately, this provides her with significant relief. Recently, being fairly stable, only having to use her last resort back up, which is Imitrex on 2 to 3 occasions since last visit. Otherwise, she remains on a regimen including Aimovig 140 mg once monthly for prevention, levetiracetam 500 mg in the morning and 1000 mg at night for prevention, Namenda 10 mg 2 times daily for prevention, Nurtec 75 mg every other day for prevention, and can be used episodically for breakthrough migraine, naproxen 500 mg twice per day for breakthrough migraine, and sumatriptan 50 mg tablet when needed for resistant breakthrough migraine.    Migraine  Headache     Migraine  The patient reports that she is doing well on several medications. She states that everything is staying about the same, and that it is under control. She notes that the only issues she has had are when she had \"hiccups\" with her insurance with the Aimovig prescription. She states that she needs some refills for some of her medications.      Black \"dot\" appears  The patient reports that she has been having a black dot appear in her right eye for the last 3 months. She states that it comes and goes. She notes that it seems like when she has headaches, it will appear. She states that she can look at something and it will go back and forth, but then it goes away. She notes that she is due for another eye exam in early 08/2023. She states that she has noticed that her contacts have changed and has noticed her vision is not as good.       The following portions of the patient's history were reviewed and updated as appropriate: allergies, current medications, past family history, past medical history, past social history, past surgical history and problem list.    Review of Systems:  A review of systems was " performed, and positive findings are noted in the HPI.      Current Outpatient Medications:     amoxicillin-clavulanate (AUGMENTIN) 875-125 MG per tablet, Take 1 tablet by mouth 2 (Two) Times a Day., Disp: 14 tablet, Rfl: 0    cyclobenzaprine (FLEXERIL) 10 MG tablet, TAKE 1 TABLET BY MOUTH THREE TIMES DAILY AS NEEDED FOR MUSCLE SPASMS (Patient taking differently: Take 1 tablet by mouth 3 (Three) Times a Day As Needed.), Disp: 90 tablet, Rfl: 2    Erenumab-aooe (Aimovig) 140 MG/ML prefilled syringe, Inject 1 mL under the skin into the appropriate area as directed Every 30 (Thirty) Days., Disp: 1 pen, Rfl: 11    fluticasone (FLONASE) 50 MCG/ACT nasal spray, 2 sprays into the nostril(s) as directed by provider Daily., Disp: 11.1 mL, Rfl: 2    levETIRAcetam (KEPPRA) 500 MG tablet, TAKE 1 TABLET BY MOUTH EVERY MORNING THEN TAKE 2 TABLETS BY MOUTH EVERY EVENING (Patient taking differently: Take 3 tablets by mouth Daily. TAKE 1 TABLET BY MOUTH EVERY MORNING THEN TAKE 2 TABLETS BY MOUTH EVERY EVENING), Disp: 90 tablet, Rfl: 2    Levonorgest-Eth Estrad 91-Day (Simpesse) 0.15-0.03 &0.01 MG tablet, Take 1 tablet by mouth Daily., Disp: 91 each, Rfl: 3    levothyroxine (SYNTHROID, LEVOTHROID) 50 MCG tablet, Take 1 tablet by mouth Daily., Disp: 90 tablet, Rfl: 3    memantine (NAMENDA) 10 MG tablet, Take 1 tablet by mouth 2 (Two) Times a Day., Disp: 60 tablet, Rfl: 6    metoprolol tartrate (LOPRESSOR) 25 MG tablet, Take 1 tablet by mouth 2 (Two) Times a Day., Disp: 180 tablet, Rfl: 3    naproxen (NAPROSYN) 500 MG tablet, TAKE 1 TABLET BY MOUTH TWICE DAILY AS NEEDED FOR MODERATE PAIN OR HEADACHE (Patient taking differently: Take 1 tablet by mouth 2 (Two) Times a Day As Needed for Headache.), Disp: 60 tablet, Rfl: 1    Rimegepant Sulfate (NURTEC) 75 MG tablet dispersible tablet, Take 1 tablet by mouth Take As Directed. TAKE ONE TABLET EVERY OTHER DAY, AS DIRECTED., Disp: 16 tablet, Rfl: 11    SUMAtriptan (IMITREX) 50 MG tablet, Take  1 tablet at onset of migraine, may repeat in 2 hours if needed.  Not to exceed 2 doses in 24 hours., Disp: 9 tablet, Rfl: 1     Objective   Physical Exam  Vitals and nursing note reviewed.   HENT:      Head: Normocephalic.      Right Ear: Hearing and external ear normal.      Left Ear: Hearing and external ear normal.      Nose: Nose normal.      Mouth/Throat:      Pharynx: Oropharynx is clear.   Eyes:      General: Lids are normal. Vision grossly intact. Gaze aligned appropriately. No scleral icterus.     Extraocular Movements: Extraocular movements intact.      Conjunctiva/sclera: Conjunctivae normal.      Pupils: Pupils are equal, round, and reactive to light.      Visual Fields: Right eye visual fields normal and left eye visual fields normal.   Neck:      Vascular: No carotid bruit or JVD.      Trachea: Trachea and phonation normal.   Cardiovascular:      Rate and Rhythm: Normal rate.      Heart sounds: Normal heart sounds.   Pulmonary:      Effort: Pulmonary effort is normal.      Breath sounds: Normal breath sounds.   Musculoskeletal:      Cervical back: Normal range of motion.   Skin:     General: Skin is warm and dry.   Neurological:      Mental Status: She is alert and oriented to person, place, and time.      GCS: GCS eye subscore is 4. GCS verbal subscore is 5. GCS motor subscore is 6.      Cranial Nerves: Cranial nerves 2-12 are intact.      Sensory: Sensation is intact.      Motor: Motor function is intact.      Coordination: Coordination is intact.      Gait: Gait is intact.      Deep Tendon Reflexes: Reflexes are normal and symmetric.   Psychiatric:         Attention and Perception: Attention and perception normal.         Mood and Affect: Mood and affect normal.         Speech: Speech normal.         Behavior: Behavior normal.         Thought Content: Thought content normal.         Cognition and Memory: Cognition and memory normal.         Judgment: Judgment normal.       Assessment & Plan    Diagnoses and all orders for this visit:    1. Intractable chronic migraine without aura and without status migrainosus  -     memantine (NAMENDA) 10 MG tablet; Take 1 tablet by mouth 2 (Two) Times a Day.  Dispense: 60 tablet; Refill: 6  -     SUMAtriptan (IMITREX) 50 MG tablet; Take 1 tablet at onset of migraine, may repeat in 2 hours if needed.  Not to exceed 2 doses in 24 hours.  Dispense: 9 tablet; Refill: 1      1. Migraine  - Under reasonable control. These have been chronic and difficult to control, but the present regimen is working well. Recommend that we stay on the present regimen.               Transcribed from ambient dictation for CARLEE Guevara by Belinda Avalos.  06/08/23   12:21 CDT    Patient or patient representative verbalized consent to the visit recording.  I have personally performed the services described in this document as transcribed by the above individual, and it is both accurate and complete.

## 2023-08-29 DIAGNOSIS — I10 ESSENTIAL HYPERTENSION: ICD-10-CM

## 2023-09-10 DIAGNOSIS — G43.719 INTRACTABLE CHRONIC MIGRAINE WITHOUT AURA AND WITHOUT STATUS MIGRAINOSUS: ICD-10-CM

## 2023-09-11 RX ORDER — LEVETIRACETAM 500 MG/1
TABLET ORAL
Qty: 90 TABLET | Refills: 2 | Status: SHIPPED | OUTPATIENT
Start: 2023-09-11

## 2023-09-12 RX ORDER — LEVOTHYROXINE SODIUM 0.05 MG/1
50 TABLET ORAL DAILY
Qty: 90 TABLET | Refills: 3 | Status: SHIPPED | OUTPATIENT
Start: 2023-09-12

## 2023-09-24 DIAGNOSIS — G43.719 INTRACTABLE CHRONIC MIGRAINE WITHOUT AURA AND WITHOUT STATUS MIGRAINOSUS: ICD-10-CM

## 2023-09-25 RX ORDER — SUMATRIPTAN 50 MG/1
TABLET, FILM COATED ORAL
Qty: 9 TABLET | Refills: 1 | Status: SHIPPED | OUTPATIENT
Start: 2023-09-25

## 2023-10-10 DIAGNOSIS — G43.719 INTRACTABLE CHRONIC MIGRAINE WITHOUT AURA AND WITHOUT STATUS MIGRAINOSUS: ICD-10-CM

## 2023-10-10 RX ORDER — ERENUMAB-AOOE 140 MG/ML
140 INJECTION, SOLUTION SUBCUTANEOUS
Qty: 1 ML | Refills: 2 | Status: SHIPPED | OUTPATIENT
Start: 2023-10-10

## 2023-10-10 NOTE — TELEPHONE ENCOUNTER
Caller: AmadorTalita    Relationship: Self    Best call back number: [phone]     Requested Prescriptions:   Requested Prescriptions     Pending Prescriptions Disp Refills    Erenumab-aooe (Aimovig) 140 MG/ML auto-injector       Sig: Inject 1 mL under the skin into the appropriate area as directed Every 30 (Thirty) Days.        [id number] [address] [phone] [facility] [phone] FX     Last office visit with prescribing clinician: 6/8/2023   Last telemedicine visit with prescribing clinician: Visit date not found   Next office visit with prescribing clinician: 12/8/2023     Additional details provided by patient: PATIENT ADVISES A PRIOR AUTH IS NEEDED FOR RX.    PLEASE INITIATE PRIOR AUTH & SEND NEW RX OR CALL TO ADVISE-THANK YOU     Does the patient have less than a 3 day supply:  [x] Yes  [] No    Would you like a call back once the refill request has been completed: [] Yes [x] No    If the office needs to give you a call back, can they leave a voicemail: [] Yes [x] No    Honey Mcduffie Rep   10/10/23 15:17 CDT

## 2023-11-10 ENCOUNTER — OFFICE VISIT (OUTPATIENT)
Dept: INTERNAL MEDICINE | Facility: CLINIC | Age: 43
End: 2023-11-10
Payer: COMMERCIAL

## 2023-11-10 VITALS
HEIGHT: 62 IN | SYSTOLIC BLOOD PRESSURE: 136 MMHG | OXYGEN SATURATION: 98 % | DIASTOLIC BLOOD PRESSURE: 92 MMHG | BODY MASS INDEX: 41.04 KG/M2 | TEMPERATURE: 98 F | HEART RATE: 79 BPM | WEIGHT: 223 LBS

## 2023-11-10 DIAGNOSIS — E66.01 MORBID OBESITY: ICD-10-CM

## 2023-11-10 DIAGNOSIS — E03.9 HYPOTHYROIDISM, UNSPECIFIED TYPE: ICD-10-CM

## 2023-11-10 DIAGNOSIS — G43.719 INTRACTABLE CHRONIC MIGRAINE WITHOUT AURA AND WITHOUT STATUS MIGRAINOSUS: ICD-10-CM

## 2023-11-10 DIAGNOSIS — Z23 FLU VACCINE NEED: Primary | ICD-10-CM

## 2023-11-10 DIAGNOSIS — M19.90 INFLAMMATORY ARTHRITIS: ICD-10-CM

## 2023-11-10 DIAGNOSIS — I10 ESSENTIAL HYPERTENSION: ICD-10-CM

## 2023-11-10 NOTE — PROGRESS NOTES
"      Chief Complaint  Annual Exam, Flu Vaccine, and Weight Loss (Discuss Wegovy.)    Subjective        Talita English presents to [facility]    HPI  Patient here for the above problems.  See Assessment and Plan for further HPI components.        Review of Systems    Objective   Vital Signs:  /92 (BP Location: Left arm, Patient Position: Sitting, Cuff Size: Adult)   Pulse 79   Temp 98 °F (36.7 °C) (Infrared)   Ht 157.5 cm (62\")   Wt 101 kg (223 lb)   SpO2 98%   BMI 40.79 kg/m²   Estimated body mass index is 40.79 kg/m² as calculated from the following:    Height as of this encounter: 157.5 cm (62\").    Weight as of this encounter: 101 kg (223 lb).      Physical Exam  Vitals reviewed.   Constitutional:       Appearance: She is obese.   Eyes:      General: No scleral icterus.     Conjunctiva/sclera: Conjunctivae normal.   Cardiovascular:      Rate and Rhythm: Normal rate and regular rhythm.   Pulmonary:      Effort: Pulmonary effort is normal. No respiratory distress.   Skin:     General: Skin is warm.      Coloration: Skin is not pale.   Neurological:      General: No focal deficit present.      Mental Status: She is alert and oriented to person, place, and time.   Psychiatric:         Mood and Affect: Mood normal.         Behavior: Behavior normal.                         Assessment and Plan   Diagnoses and all orders for this visit:    1. Flu vaccine need (Primary)  -     Fluzone (or Fluarix & Flulaval for VFC) >6mos    2. Essential hypertension  -     Comprehensive Metabolic Panel  -     CBC & Differential  -     Lipid Panel  -     TSH Rfx On Abnormal To Free T4  -     Urinalysis With Microscopic - Urine, Clean Catch  -     Hemoglobin A1c    3. Morbid obesity  -     Lipid Panel  -     Hemoglobin A1c    4. Hypothyroidism, unspecified type  -     TSH Rfx On Abnormal To Free T4  -     Hemoglobin A1c    5. Inflammatory arthritis    6. Intractable chronic migraine without " aura and without status migrainosus        Patient presents today for annual exam.    Recommend at least annual dental and vision screening.  Recommend annual influenza vaccination - Given today  Recommend a varied diet and appropriate portion sizes.   CDC recommendations for physical activity:  At least 150 minutes a week (for example, 30 minutes a day, 5 days a week) of moderate-intensity activity such as brisk walking. Or can consider 75 minutes a week of vigorous-intensity activity such as hiking, jogging, or running.  At least 2 days a week of activities that strengthen muscles.  Plus activities to improve balance.  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  Patient has some difficulty with doing exercise due to autoimmune disorder not otherwise specified.  She is followed with rheumatology.  They have not been able to put a clear and concise diagnosis label on it.  She does break out in what sounds to be a malar rash and has significant hand pain as well as myalgias and arthralgias.  Her knees and back bother her quite a bit of the time.  We both agree that weight loss would likely be beneficial for the patient.  We discussed dietary changes as well as potential pharmacological agents.  Patient is going to call her insurance and see if any pharmacological agents would be covered for her.    Would like to get some of the records from her rheumatologist to see what work-up has been done.  Discussed with her that we could also consider using Celebrex instead of the Naprosyn since it is a Pires 2 selective inhibitor and can impact the stomach little bit less.  Think that this may be beneficial for the joint pain as well.    Health Maintenance Summary            Overdue - ANNUAL PHYSICAL (Yearly) Overdue since 1/3/2023      01/03/2022  Registry Metric: Last Annual Physical    06/09/2020  Done              Postponed - COVID-19 Vaccine (4 - 2023-24 season) Postponed until  11/12/2023      11/10/2023  Postponed until 11/12/2023 by Grant Canela MD (Patient Refused)    10/13/2021  Imm Admin: COVID-19 (PFIZER) Purple Cap Monovalent    02/05/2021  Imm Admin: COVID-19 (PFIZER) Purple Cap Monovalent    01/15/2021  Imm Admin: COVID-19 (PFIZER) Purple Cap Monovalent              BMI FOLLOWUP (Yearly) Next due on 3/2/2024      03/02/2023  SmartData: WORKFLOW - QUALITY MEASUREMENT - BMI FOLLOW UP CARE PLAN DOCUMENTED    03/02/2023  SmartData: WORKFLOW - QUALITY MEASUREMENT - DOCUMENTED WEIGHT FOLLOW-UP PLAN    12/08/2022  SmartData: WORKFLOW - QUALITY MEASUREMENT - BMI FOLLOW UP CARE PLAN DOCUMENTED    12/08/2022  SmartData: WORKFLOW - QUALITY MEASUREMENT - DOCUMENTED WEIGHT FOLLOW-UP PLAN    08/25/2022  SmartData: WORKFLOW - QUALITY MEASUREMENT - BMI FOLLOW UP CARE PLAN DOCUMENTED    Only the first 5 history entries have been loaded, but more history exists.              PAP SMEAR (Every 3 Years) Next due on 12/8/2025 12/08/2022  Liquid-based Pap Smear, Screening    12/08/2022  HPV DNA Probe, Direct - ThinPrep Vial, Cervix    12/06/2021  Liquid-based Pap Smear, Screening    12/06/2021  HPV DNA Probe, Direct - ThinPrep Vial, Cervix    07/24/2020  Liquid-based Pap Smear, Screening    Only the first 5 history entries have been loaded, but more history exists.              TDAP/TD VACCINES (2 - Td or Tdap) Next due on 1/4/2032 01/04/2022  Imm Admin: Tdap    11/03/2020  Postponed until 11/9/2021 by Bina Carmona, HOLLY (Not Indicated)              HEPATITIS C SCREENING  Completed      06/12/2020  Hep C Virus Ab component of Hepatitis C antibody              INFLUENZA VACCINE  Completed      11/10/2023  Imm Admin: Fluzone (or Fluarix & Flulaval for VFC) >6mos    11/15/2022  Imm Admin: Flu Vaccine Intradermal Quad 18-64YR    11/20/2021  Done    11/03/2020  Imm Admin: Fluzone (or Fluarix & Flulaval for VFC) >6mos    11/03/2020  Done    Only the first 5 history entries have  been loaded, but more history exists.              Pneumococcal Vaccine 0-64 (Series Information) Aged Out      No completion, postpone, or frequency change history exists for this topic.                        Result Review :  The following data was reviewed by: Grant Canela MD on 11/10/2023:                 Class 3 Severe Obesity (BMI >=40). Obesity-related health conditions include the following: osteoarthritis and lower extremity venous stasis disease. Obesity is worsening. BMI is is above average; BMI management plan is completed. We discussed portion control, increasing exercise, and pharmacologic options including GLP-1 .            Follow Up   Return in 6 months (on 5/10/2024), or if symptoms worsen or fail to improve, for 3 months if weight loss medication approved; 6 months if not approved.  Patient was given instructions and counseling regarding her condition or for health maintenance advice. Please see specific information pulled into the AVS if appropriate.       KENJI Cnaela MD, FACP, [facility]      Electronically signed by Grant Canela MD, 11/10/23, 10:12 AM CST.

## 2023-11-11 LAB
ALBUMIN SERPL-MCNC: 4.2 G/DL (ref 3.9–4.9)
ALBUMIN/GLOB SERPL: 1.5 {RATIO} (ref 1.2–2.2)
ALP SERPL-CCNC: 77 IU/L (ref 44–121)
ALT SERPL-CCNC: 13 IU/L (ref 0–32)
APPEARANCE UR: CLEAR
AST SERPL-CCNC: 21 IU/L (ref 0–40)
BACTERIA #/AREA URNS HPF: NORMAL /[HPF]
BASOPHILS # BLD AUTO: 0.1 X10E3/UL (ref 0–0.2)
BASOPHILS NFR BLD AUTO: 1 %
BILIRUB SERPL-MCNC: 0.4 MG/DL (ref 0–1.2)
BILIRUB UR QL STRIP: NEGATIVE
BUN SERPL-MCNC: 8 MG/DL (ref 6–24)
BUN/CREAT SERPL: 8 (ref 9–23)
CALCIUM SERPL-MCNC: 9.6 MG/DL (ref 8.7–10.2)
CASTS URNS QL MICRO: NORMAL /LPF
CHLORIDE SERPL-SCNC: 103 MMOL/L (ref 96–106)
CHOLEST SERPL-MCNC: 196 MG/DL (ref 100–199)
CO2 SERPL-SCNC: 24 MMOL/L (ref 20–29)
COLOR UR: YELLOW
CREAT SERPL-MCNC: 0.99 MG/DL (ref 0.57–1)
EGFRCR SERPLBLD CKD-EPI 2021: 73 ML/MIN/1.73
EOSINOPHIL # BLD AUTO: 0.1 X10E3/UL (ref 0–0.4)
EOSINOPHIL NFR BLD AUTO: 1 %
EPI CELLS #/AREA URNS HPF: NORMAL /HPF (ref 0–10)
ERYTHROCYTE [DISTWIDTH] IN BLOOD BY AUTOMATED COUNT: 12.4 % (ref 11.7–15.4)
GLOBULIN SER CALC-MCNC: 2.8 G/DL (ref 1.5–4.5)
GLUCOSE SERPL-MCNC: 80 MG/DL (ref 70–99)
GLUCOSE UR QL STRIP: NEGATIVE
HBA1C MFR BLD: 5.7 % (ref 4.8–5.6)
HCT VFR BLD AUTO: 42.5 % (ref 34–46.6)
HDLC SERPL-MCNC: 50 MG/DL
HGB BLD-MCNC: 13.9 G/DL (ref 11.1–15.9)
HGB UR QL STRIP: NEGATIVE
IMM GRANULOCYTES # BLD AUTO: 0 X10E3/UL (ref 0–0.1)
IMM GRANULOCYTES NFR BLD AUTO: 0 %
KETONES UR QL STRIP: NEGATIVE
LDLC SERPL CALC-MCNC: 130 MG/DL (ref 0–99)
LEUKOCYTE ESTERASE UR QL STRIP: NEGATIVE
LYMPHOCYTES # BLD AUTO: 2.2 X10E3/UL (ref 0.7–3.1)
LYMPHOCYTES NFR BLD AUTO: 24 %
MCH RBC QN AUTO: 28.4 PG (ref 26.6–33)
MCHC RBC AUTO-ENTMCNC: 32.7 G/DL (ref 31.5–35.7)
MCV RBC AUTO: 87 FL (ref 79–97)
MICRO URNS: NORMAL
MICRO URNS: NORMAL
MONOCYTES # BLD AUTO: 0.5 X10E3/UL (ref 0.1–0.9)
MONOCYTES NFR BLD AUTO: 5 %
NEUTROPHILS # BLD AUTO: 6.4 X10E3/UL (ref 1.4–7)
NEUTROPHILS NFR BLD AUTO: 69 %
NITRITE UR QL STRIP: NEGATIVE
PH UR STRIP: 6.5 [PH] (ref 5–7.5)
PLATELET # BLD AUTO: 306 X10E3/UL (ref 150–450)
POTASSIUM SERPL-SCNC: 4.6 MMOL/L (ref 3.5–5.2)
PROT SERPL-MCNC: 7 G/DL (ref 6–8.5)
PROT UR QL STRIP: NEGATIVE
RBC # BLD AUTO: 4.9 X10E6/UL (ref 3.77–5.28)
RBC #/AREA URNS HPF: NORMAL /HPF (ref 0–2)
SODIUM SERPL-SCNC: 139 MMOL/L (ref 134–144)
SP GR UR STRIP: 1.01 (ref 1–1.03)
TRIGL SERPL-MCNC: 86 MG/DL (ref 0–149)
TSH SERPL DL<=0.005 MIU/L-ACNC: 1.81 UIU/ML (ref 0.45–4.5)
UROBILINOGEN UR STRIP-MCNC: 0.2 MG/DL (ref 0.2–1)
VLDLC SERPL CALC-MCNC: 16 MG/DL (ref 5–40)
WBC # BLD AUTO: 9.2 X10E3/UL (ref 3.4–10.8)
WBC #/AREA URNS HPF: NORMAL /HPF (ref 0–5)

## 2023-11-21 ENCOUNTER — TELEPHONE (OUTPATIENT)
Dept: INTERNAL MEDICINE | Facility: CLINIC | Age: 43
End: 2023-11-21

## 2023-11-21 NOTE — TELEPHONE ENCOUNTER
Caller: Talita English    Relationship: Self    Best call back number: [phone]     Who are you requesting to speak with (clinical staff, provider,  specific staff member): CLINICAL STAFF    What was the call regarding: PATIENT STATES SHE CONTACTED HER INSURANCE AND THEY DO NOT COVER ANY WEIGHT LOSS DRUGS.

## 2023-11-21 NOTE — TELEPHONE ENCOUNTER
Caller: Talita English    Relationship: Self    Best call back number: [phone] (Mobile)     What medication are you requesting: SOMETHING TO TREAT SYMPTOMS    What are your current symptoms: SORE THROAT, RUNNY NOSE    How long have you been experiencing symptoms: COUPLE OF DAYS      If a prescription is needed, what is your preferred pharmacy and phone number: [id number] - [place], [place] - [address]  AT [facility] [address] - [phone]  - [phone] FX     Additional notes: PATIENT UNABLE TO COME IN OFFICE FOR AN APPT.

## 2023-11-26 DIAGNOSIS — G43.719 INTRACTABLE CHRONIC MIGRAINE WITHOUT AURA AND WITHOUT STATUS MIGRAINOSUS: ICD-10-CM

## 2023-11-27 RX ORDER — RIMEGEPANT SULFATE 75 MG/75MG
TABLET, ORALLY DISINTEGRATING ORAL
Qty: 16 TABLET | Refills: 5 | Status: SHIPPED | OUTPATIENT
Start: 2023-11-27

## 2023-12-06 ENCOUNTER — TELEPHONE (OUTPATIENT)
Dept: NEUROLOGY | Facility: CLINIC | Age: 43
End: 2023-12-06
Payer: COMMERCIAL

## 2023-12-10 DIAGNOSIS — G43.719 INTRACTABLE CHRONIC MIGRAINE WITHOUT AURA AND WITHOUT STATUS MIGRAINOSUS: ICD-10-CM

## 2023-12-11 RX ORDER — LEVETIRACETAM 500 MG/1
TABLET ORAL
Qty: 90 TABLET | Refills: 2 | Status: SHIPPED | OUTPATIENT
Start: 2023-12-11

## 2023-12-14 ENCOUNTER — OFFICE VISIT (OUTPATIENT)
Dept: OBSTETRICS AND GYNECOLOGY | Facility: CLINIC | Age: 43
End: 2023-12-14
Payer: COMMERCIAL

## 2023-12-14 VITALS
SYSTOLIC BLOOD PRESSURE: 120 MMHG | WEIGHT: 220 LBS | DIASTOLIC BLOOD PRESSURE: 64 MMHG | HEIGHT: 62 IN | BODY MASS INDEX: 40.48 KG/M2

## 2023-12-14 DIAGNOSIS — Z12.31 ENCOUNTER FOR SCREENING MAMMOGRAM FOR MALIGNANT NEOPLASM OF BREAST: ICD-10-CM

## 2023-12-14 DIAGNOSIS — Z01.419 WELL WOMAN EXAM WITH ROUTINE GYNECOLOGICAL EXAM: Primary | ICD-10-CM

## 2023-12-14 DIAGNOSIS — G43.829 MENSTRUAL MIGRAINE WITHOUT STATUS MIGRAINOSUS, NOT INTRACTABLE: ICD-10-CM

## 2023-12-14 RX ORDER — LEVONORGESTREL / ETHINYL ESTRADIOL AND ETHINYL ESTRADIOL 150-30(84)
1 KIT ORAL DAILY
Qty: 91 EACH | Refills: 3 | Status: SHIPPED | OUTPATIENT
Start: 2023-12-14

## 2023-12-14 NOTE — PROGRESS NOTES
Subjective   Talita English is a 43 y.o. female  YOB: 1980        Chief Complaint   Patient presents with    Gynecologic Exam     Patient is here for annual well GYN Exam. Last well GYN exam and pap 12/8/22, WNL. Last Mammo 3/10/23 WNL.   Pt is ok with no pap   Refill on ocp       Gynecologic Exam  The patient's pertinent negatives include no pelvic pain. Pertinent negatives include no abdominal pain, back pain, constipation, diarrhea, dysuria, fever, frequency, hematuria, nausea, rash, sore throat, urgency or vomiting.       The following portions of the patient's history were reviewed and updated as appropriate: allergies, current medications, past family history, past medical history, past social history, past surgical history, and problem list.    Allergies   Allergen Reactions    Codeine Nausea And Vomiting    Elavil [Amitriptyline Hcl] Rash    Maxalt [Rizatriptan Benzoate] Nausea Only    Relpax [Eletriptan] Nausea Only       Past Medical History:   Diagnosis Date    Anemia 12/2018    Cluster headache     Difficulty walking     Disease of thyroid gland     hypothyroid    Fibromyalgia     Fibromyalgia, primary     GERD (gastroesophageal reflux disease)     Headache, tension-type     Hypertension     Hypothyroidism See chart    Inflammatory arthritis 11/10/2023    Memory loss     Migraines     Ovarian cyst     PONV (postoperative nausea and vomiting)     Urinary tract infection     Varicella     Vision loss        Family History   Problem Relation Age of Onset    Hypertension Mother     Melanoma Mother     Cancer Mother 46        Skin melanoma    Cancer Father         Bladder cancer    Heart attack Father     Heart disease Father     Arrhythmia Father     Abnormal EKG Father     Vision loss Father         Glaucoma    Colon cancer Paternal Uncle     Migraines Brother     Hypertension Brother     Heart attack Sister     Hypertension Sister     Heart disease Maternal Grandmother     Arthritis  Maternal Grandmother         Rheumatoid arthritis    Asthma Maternal Grandmother     Heart attack Maternal Grandfather     Heart disease Maternal Grandfather     Heart attack Paternal Grandmother     Aneurysm Paternal Grandfather     Stroke Paternal Grandfather          of Brain Aneurysm    Heart disease Paternal Grandfather     Diabetes Sister     Hypertension Sister     Colon polyps Neg Hx     Seizures Neg Hx     Breast cancer Neg Hx     Ovarian cancer Neg Hx     Uterine cancer Neg Hx        Social History     Socioeconomic History    Marital status:    Tobacco Use    Smoking status: Never    Smokeless tobacco: Never   Substance and Sexual Activity    Alcohol use: Yes     Alcohol/week: 1.0 - 2.0 standard drink of alcohol     Types: 1 - 2 Glasses of wine per week     Comment: rarely    Drug use: No    Sexual activity: Yes     Partners: Male     Birth control/protection: Pill, Birth control pill     Comment: Amethia oral birth control         Current Outpatient Medications:     cyclobenzaprine (FLEXERIL) 10 MG tablet, TAKE 1 TABLET BY MOUTH THREE TIMES DAILY AS NEEDED FOR MUSCLE SPASMS (Patient taking differently: Take 1 tablet by mouth 3 (Three) Times a Day As Needed.), Disp: 90 tablet, Rfl: 2    Erenumab-aooe (Aimovig) 140 MG/ML auto-injector, Inject 1 mL under the skin into the appropriate area as directed Every 30 (Thirty) Days., Disp: 1 mL, Rfl: 2    fluticasone (FLONASE) 50 MCG/ACT nasal spray, 2 sprays into the nostril(s) as directed by provider Daily., Disp: 11.1 mL, Rfl: 2    levETIRAcetam (KEPPRA) 500 MG tablet, TAKE 1 TABLET BY MOUTH EVERY MORNING THEN TAKE 2 TABLETS BY MOUTH EVERY EVENING, Disp: 90 tablet, Rfl: 2    Levonorgest-Eth Estrad -Day (Simpesse) 0.15-0.03 &0.01 MG, Take 1 tablet by mouth Daily., Disp: 91 each, Rfl: 3    levothyroxine (SYNTHROID, LEVOTHROID) 50 MCG tablet, TAKE 1 TABLET BY MOUTH DAILY, Disp: 90 tablet, Rfl: 3    memantine (NAMENDA) 10 MG tablet, Take 1 tablet by  mouth 2 (Two) Times a Day., Disp: 60 tablet, Rfl: 6    metoprolol tartrate (LOPRESSOR) 25 MG tablet, Take 1 tablet by mouth 2 (Two) Times a Day., Disp: 180 tablet, Rfl: 3    naproxen (NAPROSYN) 500 MG tablet, TAKE 1 TABLET BY MOUTH TWICE DAILY AS NEEDED FOR MODERATE PAIN OR HEADACHE, Disp: 60 tablet, Rfl: 1    Nurtec 75 MG dispersible tablet, DISSOLVE 1 TABLET ON THE TONGUE EVERY OTHER DAY AS DIRECTED, Disp: 16 tablet, Rfl: 5    SUMAtriptan (IMITREX) 50 MG tablet, TAKE 1 TABLET BY MOUTH AT ONSET OF MIGRAINE. MAY REPEAT IN 2 HOURS AS NEEDED. NOT TO EXCEED 2 DOSES IN 24 HOURS, Disp: 9 tablet, Rfl: 1    No LMP recorded. (Menstrual status: Oral contraceptives).    Sexual History:           Could not be calculated    Past Surgical History:   Procedure Laterality Date     SECTION      ENDOSCOPY N/A 2019    Normal 1st and 2nd portions of the duodenum-biopsied; Normal stomach; LA grade A esophagitis; No esophageal abnormality to explain patient's dysphagia-esophagus dilated        Review of Systems   Constitutional:  Negative for activity change, appetite change, fatigue, fever, unexpected weight gain and unexpected weight loss.   HENT:  Negative for congestion, ear pain, hearing loss, nosebleeds, rhinorrhea, sore throat, tinnitus and trouble swallowing.    Eyes:  Negative for blurred vision, pain, discharge, itching and visual disturbance.   Respiratory:  Negative for apnea, chest tightness, shortness of breath and wheezing.    Cardiovascular:  Negative for chest pain and leg swelling.   Gastrointestinal:  Negative for abdominal pain, blood in stool, constipation, diarrhea, nausea, vomiting and GERD.   Endocrine: Negative for heat intolerance, polydipsia and polyuria.   Genitourinary: Negative.  Negative for breast lump, decreased libido, difficulty urinating, dyspareunia, dysuria, frequency, genital sores, hematuria, menstrual problem, pelvic pain, urgency, urinary incontinence and vaginal pain.    Musculoskeletal:  Negative for arthralgias, back pain, joint swelling and myalgias.   Skin:  Negative for color change, rash and skin lesions.   Allergic/Immunologic: Negative for environmental allergies, food allergies and immunocompromised state.   Neurological:  Negative for dizziness, tremors, seizures, syncope, facial asymmetry, numbness and headache.   Hematological:  Negative for adenopathy. Does not bruise/bleed easily.   Psychiatric/Behavioral:  Negative for agitation, hallucinations, sleep disturbance, suicidal ideas and depressed mood. The patient is not nervous/anxious.        Objective   Physical Exam  Vitals reviewed.   Constitutional:       General: She is not in acute distress.     Appearance: She is well-developed. She is not ill-appearing.   HENT:      Head: Normocephalic.      Right Ear: External ear normal.      Left Ear: External ear normal.      Nose: Nose normal.      Mouth/Throat:      Pharynx: No oropharyngeal exudate.   Eyes:      General: No scleral icterus.        Right eye: No discharge.         Left eye: No discharge.      Conjunctiva/sclera: Conjunctivae normal.      Pupils: Pupils are equal, round, and reactive to light.   Neck:      Thyroid: No thyroid mass or thyromegaly.   Cardiovascular:      Rate and Rhythm: Normal rate and regular rhythm.      Heart sounds: Normal heart sounds. No murmur heard.  Pulmonary:      Effort: Pulmonary effort is normal. No respiratory distress.      Breath sounds: Normal breath sounds. No wheezing or rales.   Chest:      Chest wall: No tenderness.   Abdominal:      General: Bowel sounds are normal. There is no distension.      Palpations: Abdomen is soft. There is no mass.      Tenderness: There is no abdominal tenderness. There is no guarding or rebound.      Hernia: No hernia is present.   Genitourinary:     Exam position: Prone.      Labia:         Right: No rash, tenderness or lesion.         Left: No rash, tenderness, lesion or injury.        "Vagina: Normal. No vaginal discharge or tenderness.      Cervix: No cervical motion tenderness, discharge or friability.      Uterus: Not enlarged and not tender.       Adnexa:         Right: No mass or tenderness.          Left: No mass or tenderness.        Comments: Urethra and urethral meatus normal.    Bladder - normal, no prolapse.  Perineum and rectum examined - intact and no lesions.    Musculoskeletal:         General: No tenderness or deformity. Normal range of motion.      Cervical back: Normal range of motion and neck supple.   Lymphadenopathy:      Cervical: No cervical adenopathy.   Skin:     General: Skin is warm and dry.      Coloration: Skin is not pale.      Findings: No erythema or rash.   Neurological:      Mental Status: She is alert and oriented to person, place, and time.      Motor: No abnormal muscle tone.      Coordination: Coordination normal.      Deep Tendon Reflexes: Reflexes are normal and symmetric.   Psychiatric:         Behavior: Behavior normal. Behavior is cooperative.         Thought Content: Thought content normal.         Judgment: Judgment normal.           Vitals:    12/14/23 0859   BP: 120/64   Weight: 99.8 kg (220 lb)   Height: 157.5 cm (62\")       Diagnoses and all orders for this visit:    1. Well woman exam with routine gynecological exam (Primary)  Comments:  Normal well woman exam.  Mammogram ordered.    2. Encounter for screening mammogram for malignant neoplasm of breast  -     Mammo Screening Digital Tomosynthesis Bilateral With CAD; Future    3. Menstrual migraine without status migrainosus, not intractable  Comments:  Patient is on Vera for both birth control and menstrual migraines.  Doing well on it.  Refill sent to pharmacy.  Orders:  -     Levonorgest-Eth Estrad 91-Day (Simpesse) 0.15-0.03 &0.01 MG; Take 1 tablet by mouth Daily.  Dispense: 91 each; Refill: 3        Normal GYN exam. Will have lab work here. Encouraged SBE.  Pt is aware how to do self breast " exam and the importance of same. Discussed weight management and importance of maintaining a healthy weight. Discussed Vitamin D intake and the importance of adequate vitamin D for both bone health and a healthy immune system.  Discussed daily exercise and the importance of same in regards to a healthy heart as well as helping to maintain her weight and improving her mental health.  Body mass index is 40.24 kg/m². Colonoscopy is not age appropriate.  Mammogram will be scheduled at [facility]. Pap smear is done per ASCCP guidelines.                  Non-Smoker    MyChart Instructions Given

## 2024-01-28 DIAGNOSIS — G43.719 INTRACTABLE CHRONIC MIGRAINE WITHOUT AURA AND WITHOUT STATUS MIGRAINOSUS: ICD-10-CM

## 2024-01-29 RX ORDER — MEMANTINE HYDROCHLORIDE 10 MG/1
10 TABLET ORAL 2 TIMES DAILY
Qty: 60 TABLET | Refills: 1 | Status: SHIPPED | OUTPATIENT
Start: 2024-01-29

## 2024-03-03 DIAGNOSIS — G43.719 INTRACTABLE CHRONIC MIGRAINE WITHOUT AURA AND WITHOUT STATUS MIGRAINOSUS: ICD-10-CM

## 2024-03-04 RX ORDER — LEVETIRACETAM 500 MG/1
TABLET ORAL
Qty: 90 TABLET | Refills: 2 | Status: SHIPPED | OUTPATIENT
Start: 2024-03-04

## 2024-03-24 DIAGNOSIS — G43.719 INTRACTABLE CHRONIC MIGRAINE WITHOUT AURA AND WITHOUT STATUS MIGRAINOSUS: ICD-10-CM

## 2024-03-25 RX ORDER — MEMANTINE HYDROCHLORIDE 10 MG/1
10 TABLET ORAL 2 TIMES DAILY
Qty: 60 TABLET | Refills: 2 | Status: SHIPPED | OUTPATIENT
Start: 2024-03-25

## 2024-04-28 DIAGNOSIS — G43.719 INTRACTABLE CHRONIC MIGRAINE WITHOUT AURA AND WITHOUT STATUS MIGRAINOSUS: ICD-10-CM

## 2024-04-28 DIAGNOSIS — M62.838 MUSCLE SPASMS OF NECK: ICD-10-CM

## 2024-04-29 RX ORDER — MEMANTINE HYDROCHLORIDE 10 MG/1
10 TABLET ORAL 2 TIMES DAILY
Qty: 180 TABLET | Refills: 0 | Status: SHIPPED | OUTPATIENT
Start: 2024-04-29

## 2024-04-29 RX ORDER — LEVETIRACETAM 500 MG/1
TABLET ORAL
Qty: 270 TABLET | Refills: 0 | Status: SHIPPED | OUTPATIENT
Start: 2024-04-29

## 2024-04-29 RX ORDER — CYCLOBENZAPRINE HCL 10 MG
TABLET ORAL
Qty: 270 TABLET | Refills: 1 | Status: SHIPPED | OUTPATIENT
Start: 2024-04-29

## 2024-05-26 DIAGNOSIS — G43.719 INTRACTABLE CHRONIC MIGRAINE WITHOUT AURA AND WITHOUT STATUS MIGRAINOSUS: ICD-10-CM

## 2024-05-28 RX ORDER — RIMEGEPANT SULFATE 75 MG/75MG
TABLET, ORALLY DISINTEGRATING ORAL
Qty: 16 TABLET | Refills: 0 | Status: SHIPPED | OUTPATIENT
Start: 2024-05-28

## 2024-06-12 ENCOUNTER — OFFICE VISIT (OUTPATIENT)
Dept: NEUROLOGY | Facility: CLINIC | Age: 44
End: 2024-06-12
Payer: COMMERCIAL

## 2024-06-12 ENCOUNTER — OFFICE VISIT (OUTPATIENT)
Dept: INTERNAL MEDICINE | Facility: CLINIC | Age: 44
End: 2024-06-12
Payer: COMMERCIAL

## 2024-06-12 VITALS
RESPIRATION RATE: 18 BRPM | DIASTOLIC BLOOD PRESSURE: 84 MMHG | HEART RATE: 103 BPM | WEIGHT: 222 LBS | BODY MASS INDEX: 40.85 KG/M2 | SYSTOLIC BLOOD PRESSURE: 132 MMHG | OXYGEN SATURATION: 98 % | HEIGHT: 62 IN

## 2024-06-12 VITALS
SYSTOLIC BLOOD PRESSURE: 128 MMHG | BODY MASS INDEX: 40.96 KG/M2 | WEIGHT: 222.6 LBS | HEIGHT: 62 IN | OXYGEN SATURATION: 98 % | DIASTOLIC BLOOD PRESSURE: 88 MMHG | TEMPERATURE: 97.7 F | HEART RATE: 83 BPM

## 2024-06-12 DIAGNOSIS — R00.2 PALPITATIONS: ICD-10-CM

## 2024-06-12 DIAGNOSIS — G43.719 INTRACTABLE CHRONIC MIGRAINE WITHOUT AURA AND WITHOUT STATUS MIGRAINOSUS: Primary | ICD-10-CM

## 2024-06-12 DIAGNOSIS — E03.9 HYPOTHYROIDISM, UNSPECIFIED TYPE: ICD-10-CM

## 2024-06-12 DIAGNOSIS — G43.909 EPISODIC MIGRAINE: ICD-10-CM

## 2024-06-12 DIAGNOSIS — I10 ESSENTIAL HYPERTENSION: ICD-10-CM

## 2024-06-12 DIAGNOSIS — G47.30 SLEEP APNEA, UNSPECIFIED TYPE: ICD-10-CM

## 2024-06-12 DIAGNOSIS — E66.01 MORBID OBESITY: ICD-10-CM

## 2024-06-12 DIAGNOSIS — R73.03 PREDIABETES: ICD-10-CM

## 2024-06-12 PROCEDURE — 99214 OFFICE O/P EST MOD 30 MIN: CPT | Performed by: INTERNAL MEDICINE

## 2024-06-12 RX ORDER — ATOGEPANT 60 MG/1
60 TABLET ORAL DAILY
Qty: 30 TABLET | Refills: 4 | Status: SHIPPED | OUTPATIENT
Start: 2024-06-12

## 2024-06-12 RX ORDER — LEVETIRACETAM 500 MG/1
TABLET ORAL
Qty: 270 TABLET | Refills: 1 | Status: SHIPPED | OUTPATIENT
Start: 2024-06-12

## 2024-06-12 RX ORDER — MEMANTINE HYDROCHLORIDE 10 MG/1
10 TABLET ORAL 2 TIMES DAILY
Qty: 180 TABLET | Refills: 1 | Status: SHIPPED | OUTPATIENT
Start: 2024-06-12

## 2024-06-12 RX ORDER — PROCHLORPERAZINE MALEATE 5 MG/1
5 TABLET ORAL EVERY 6 HOURS PRN
COMMUNITY

## 2024-06-12 RX ORDER — ERENUMAB-AOOE 140 MG/ML
140 INJECTION, SOLUTION SUBCUTANEOUS
Qty: 1 ML | Refills: 6 | Status: SHIPPED | OUTPATIENT
Start: 2024-06-12

## 2024-06-12 RX ORDER — ATOGEPANT 60 MG/1
60 TABLET ORAL DAILY
Qty: 16 TABLET | Refills: 0 | COMMUNITY
Start: 2024-06-12

## 2024-06-12 RX ORDER — RIMEGEPANT SULFATE 75 MG/75MG
75 TABLET, ORALLY DISINTEGRATING ORAL EVERY OTHER DAY
Qty: 16 TABLET | Refills: 3 | Status: SHIPPED | OUTPATIENT
Start: 2024-06-12 | End: 2024-07-03 | Stop reason: SDUPTHER

## 2024-06-12 NOTE — PROGRESS NOTES
Subjective   Talita English is a 44 y.o. female is here today for follow-up.    History of Present Illness  Refractory headaches causing significant disruption in her normal daily activities.  Consistent with migraine headache.       The following portions of the patient's history were reviewed and updated as appropriate: allergies, current medications, past family history, past medical history, past social history, past surgical history and problem list.    Review of Systems   Constitutional:  Positive for activity change and fatigue.   HENT:  Positive for congestion.    Eyes:  Positive for photophobia and visual disturbance.   Respiratory: Negative.     Cardiovascular: Negative.    Gastrointestinal:  Positive for nausea and vomiting.   Allergic/Immunologic: Positive for environmental allergies.   Neurological:  Positive for headache.   Psychiatric/Behavioral: Negative.           Current Outpatient Medications:     cyclobenzaprine (FLEXERIL) 10 MG tablet, TAKE 1 TABLET BY MOUTH THREE TIMES DAILY AS NEEDED FOR MUSCLE SPASMS, Disp: 270 tablet, Rfl: 1    Erenumab-aooe (Aimovig) 140 MG/ML auto-injector, Inject 1 mL under the skin into the appropriate area as directed Every 30 (Thirty) Days., Disp: 1 mL, Rfl: 6    fluticasone (FLONASE) 50 MCG/ACT nasal spray, 2 sprays into the nostril(s) as directed by provider Daily., Disp: 11.1 mL, Rfl: 2    levETIRAcetam (KEPPRA) 500 MG tablet, TAKE 1 TABLET BY MOUTH EVERY MORNING THEN TAKE 2 TABLETS BY MOUTH EVERY EVENING, Disp: 270 tablet, Rfl: 1    Levonorgest-Eth Estrad 91-Day (Simpesse) 0.15-0.03 &0.01 MG, Take 1 tablet by mouth Daily., Disp: 91 each, Rfl: 3    levothyroxine (SYNTHROID, LEVOTHROID) 50 MCG tablet, TAKE 1 TABLET BY MOUTH DAILY, Disp: 90 tablet, Rfl: 3    memantine (NAMENDA) 10 MG tablet, Take 1 tablet by mouth 2 (Two) Times a Day., Disp: 180 tablet, Rfl: 1    metoprolol tartrate (LOPRESSOR) 25 MG tablet, Take 1 tablet by mouth 2 (Two) Times a Day., Disp: 180  tablet, Rfl: 3    naproxen (NAPROSYN) 500 MG tablet, TAKE 1 TABLET BY MOUTH TWICE DAILY AS NEEDED FOR MODERATE PAIN OR HEADACHE, Disp: 60 tablet, Rfl: 1    prochlorperazine (COMPAZINE) 5 MG tablet, Take 1 tablet by mouth Every 6 (Six) Hours As Needed for Nausea or Vomiting., Disp: , Rfl:     SUMAtriptan (IMITREX) 50 MG tablet, TAKE 1 TABLET BY MOUTH AT ONSET OF MIGRAINE. MAY REPEAT IN 2 HOURS AS NEEDED. NOT TO EXCEED 2 DOSES IN 24 HOURS, Disp: 9 tablet, Rfl: 1    Atogepant (Qulipta) 60 MG tablet, Take 1 tablet by mouth Daily., Disp: 30 tablet, Rfl: 4    Atogepant (Qulipta) 60 MG tablet, Take 1 tablet by mouth Daily., Disp: 16 tablet, Rfl: 0    Rimegepant Sulfate (Nurtec) 75 MG tablet dispersible tablet, Take 1 tablet by mouth Daily As Needed (migraine)., Disp: 16 tablet, Rfl: 3     Objective   Physical Exam  Vitals and nursing note reviewed.   Eyes:      General: Vision grossly intact. Gaze aligned appropriately.   Cardiovascular:      Rate and Rhythm: Normal rate.   Pulmonary:      Effort: Pulmonary effort is normal.   Neurological:      Mental Status: She is alert and oriented to person, place, and time.      GCS: GCS eye subscore is 4. GCS verbal subscore is 5. GCS motor subscore is 6.      Cranial Nerves: Cranial nerves 2-12 are intact.      Sensory: Sensation is intact.      Motor: Motor function is intact.      Coordination: Coordination is intact.      Gait: Gait is intact.      Deep Tendon Reflexes: Reflexes are normal and symmetric.   Psychiatric:         Attention and Perception: Attention normal.         Mood and Affect: Mood normal.         Speech: Speech normal.         Behavior: Behavior normal.         Cognition and Memory: Cognition normal.           Assessment & Plan   Diagnoses and all orders for this visit:    1. Intractable chronic migraine without aura and without status migrainosus (Primary)  -     Erenumab-aooe (Aimovig) 140 MG/ML auto-injector; Inject 1 mL under the skin into the appropriate  area as directed Every 30 (Thirty) Days.  Dispense: 1 mL; Refill: 6  -     levETIRAcetam (KEPPRA) 500 MG tablet; TAKE 1 TABLET BY MOUTH EVERY MORNING THEN TAKE 2 TABLETS BY MOUTH EVERY EVENING  Dispense: 270 tablet; Refill: 1  -     memantine (NAMENDA) 10 MG tablet; Take 1 tablet by mouth 2 (Two) Times a Day.  Dispense: 180 tablet; Refill: 1  -     Discontinue: Rimegepant Sulfate (Nurtec) 75 MG tablet dispersible tablet; Take 1 tablet by mouth Every Other Day.  Dispense: 16 tablet; Refill: 3  -     MRI Brain With & Without Contrast; Future  -     Atogepant (Qulipta) 60 MG tablet; Take 1 tablet by mouth Daily.  Dispense: 30 tablet; Refill: 4  -     Atogepant (Qulipta) 60 MG tablet; Take 1 tablet by mouth Daily.  Dispense: 16 tablet; Refill: 0    2. Episodic migraine  -     MRI Brain With & Without Contrast; Future                     Dictated utilizing Dragon dictation.

## 2024-06-13 PROBLEM — R73.03 PREDIABETES: Status: ACTIVE | Noted: 2024-06-13

## 2024-06-13 NOTE — PROGRESS NOTES
"      Chief Complaint  Hypertension (6 month f/u;/Experienced SOB x 1 month ago along with this sx she noticed her pulse rate has been fluctuating between . /Denies chest pains.) and Migraine (6 month f/u;/Patient states sx have worsened since last visit. /States Nurtec/Sumatriptan does give slight relief. /States she does see the Neurologist today. )    Subjective        Talita English presents to [facility]    HPI    Patient here for the above problems.  See Assessment and Plan for further HPI components.      Review of Systems    Objective   Vital Signs:  /88 (BP Location: Left arm, Patient Position: Sitting, Cuff Size: Adult)   Pulse 83   Temp 97.7 °F (36.5 °C) (Infrared)   Ht 157.5 cm (62\")   Wt 101 kg (222 lb 9.6 oz)   SpO2 98%   BMI 40.71 kg/m²   Estimated body mass index is 40.71 kg/m² as calculated from the following:    Height as of this encounter: 157.5 cm (62\").    Weight as of this encounter: 101 kg (222 lb 9.6 oz).      Physical Exam  Vitals and nursing note reviewed.   Constitutional:       Appearance: She is obese. She is not ill-appearing.   Eyes:      General: No scleral icterus.     Conjunctiva/sclera: Conjunctivae normal.   Cardiovascular:      Rate and Rhythm: Normal rate and regular rhythm.   Pulmonary:      Effort: Pulmonary effort is normal. No respiratory distress.   Skin:     General: Skin is warm.      Coloration: Skin is not pale.   Neurological:      General: No focal deficit present.      Mental Status: She is alert and oriented to person, place, and time.   Psychiatric:         Mood and Affect: Mood normal.         Behavior: Behavior normal.                       Assessment and Plan   Diagnoses and all orders for this visit:    1. Intractable chronic migraine without aura and without status migrainosus (Primary)    2. Palpitations  -     Holter Monitor - 72 Hour Up To 15 Days; Future    3. Sleep apnea, unspecified type  -     Home " "Sleep Study; Future    4. Essential hypertension  -     Comprehensive Metabolic Panel; Future  -     CBC & Differential; Future  -     Lipid Panel; Future  -     Urinalysis With Microscopic - Urine, Clean Catch; Future    5. Hypothyroidism, unspecified type  -     TSH Rfx On Abnormal To Free T4; Future    6. Morbid obesity  -     Comprehensive Metabolic Panel; Future  -     CBC & Differential; Future  -     Lipid Panel; Future    7. Prediabetes  -     Hemoglobin A1c; Future      Patient has had more issues with migraine recently.  Some of this is related to stress.  She reports that the 1 that was somewhat scary to her was she had significant left-sided numbness and weakness.  This took several days to fully resolve.  She did not note any facial droop or cranial nerve involvement.  Patient indicated that she did her own physical therapy and was able to get it better.  Worried about TIA or complex migraine.  She had several days of migraine prior to that episode and had been taking more of her migraine abortive agents without any success.  She has follow-up with neurology today.  I considered ordering an MRI, but will defer to them.  If they do not order an MRI of the brain, I will likely consider doing so.    Patient has been having fluctuations in her heart rate even while at rest.  She understands her heart rate going up with activity, she indicates that she is \"out of shape\".  The patient reports palpitations, and again wild fluctuations in heart rate.  I would like to get a Holter monitor on her especially in the setting of these recent neurologic changes.  Want to ensure that she does not have atrial fibrillation.  I also think that it may be worthwhile to do a home sleep study.  Patient has had a little bit more fatigue and increased migraines.  Sleep apnea could increase the frequency of these issues.    Patient has a history of hypothyroidism.  Patient on 50 mcg of levothyroxine.  Will continue this for the " present time.    Patient has history of hypertension.  Patient has been controlled without any medications.  Recommend ambulatory blood pressure monitoring.  Avoid too many NSAIDs.  Continue current treatment plan without any changes to medications.    Patient has a BMI > 40.  Obesity increases risks of diseases such as diabetes, coronary artery disease, hypertension, sleep apnea, hyperlipidemia, arthritis, and stroke.  Recommend focusing on portion control and making healthy diet choices.  Avoid fast food.  Avoid calorie beverages including sweet tea, juice, soft drinks, and alcohol.  Recommend increasing your activity and starting a regular exercise program. Can consider utilizing calorie counting apps such as [employer].  We have previously discussed weight loss medications, she would like to hold on doing these at the present time.      Labs as above prior to next visit if able.  Next visit will be annual physical.     Result Review :                   Class 3 Severe Obesity (BMI >=40). Obesity-related health conditions include the following: hypertension. Obesity is unchanged. BMI is is above average; BMI management plan is completed. We discussed portion control and increasing exercise.            Follow Up   Return in about 6 months (around 12/12/2024), or if symptoms worsen or fail to improve, for Annual physical - Labs prior to visit.  Patient was given instructions and counseling regarding her condition or for health maintenance advice. Please see specific information pulled into the AVS if appropriate.       KENJI Canela MD, FACP, [facility]      Electronically signed by Grant Canela MD, 06/13/24, 10:38 AM CDT.          Answers submitted by the patient for this visit:  Other (Submitted on 6/10/2024)  Please describe your symptoms.: Routine follow up, all same  Have you had these symptoms before?: Yes  How long have you been having these symptoms?: Greater than 2 weeks  Please list any medications you  are currently taking for this condition.: See chart  Primary Reason for Visit (Submitted on 6/10/2024)  What is the primary reason for your visit?: Other

## 2024-06-14 ENCOUNTER — TELEPHONE (OUTPATIENT)
Dept: NEUROLOGY | Facility: CLINIC | Age: 44
End: 2024-06-14
Payer: COMMERCIAL

## 2024-06-14 NOTE — TELEPHONE ENCOUNTER
Caller: Talita English    Relationship: Self    Best call back number: [phone] (home)     What is the best time to reach you: ANYTIME  What was the call regarding: PATIENT SCHEDULED HER MRI BUT THE FIRST AVAILABLE APPT IS 7/11/24 BUT KING DELGADO STATED THAT HE WANTED IT IN TWO WEEKS. SHE SAID SHE IS FINE WITH THE DATE BUT THE  ADVISED TO HER THAT IF KING DELGADO WANTS IT EARLIER TO SENT CALL IT IN AS STAT.     IS THE DATE OF THE MRI OKAY OR DOES IT NEED TO BE CHANGED.     PLEASE ADVISE  THANK YOU

## 2024-07-03 DIAGNOSIS — G43.719 INTRACTABLE CHRONIC MIGRAINE WITHOUT AURA AND WITHOUT STATUS MIGRAINOSUS: ICD-10-CM

## 2024-07-03 RX ORDER — RIMEGEPANT SULFATE 75 MG/75MG
75 TABLET, ORALLY DISINTEGRATING ORAL DAILY PRN
Qty: 16 TABLET | Refills: 3 | Status: SHIPPED | OUTPATIENT
Start: 2024-07-03

## 2024-07-11 ENCOUNTER — HOSPITAL ENCOUNTER (OUTPATIENT)
Dept: MRI IMAGING | Facility: HOSPITAL | Age: 44
Discharge: HOME | End: 2024-07-11
Admitting: PHYSICIAN ASSISTANT
Payer: COMMERCIAL

## 2024-07-11 DIAGNOSIS — G43.719 INTRACTABLE CHRONIC MIGRAINE WITHOUT AURA AND WITHOUT STATUS MIGRAINOSUS: ICD-10-CM

## 2024-07-11 DIAGNOSIS — G43.909 EPISODIC MIGRAINE: ICD-10-CM

## 2024-07-11 PROCEDURE — 0 GADOBENATE DIMEGLUMINE 529 MG/ML SOLUTION: Performed by: PHYSICIAN ASSISTANT

## 2024-07-11 PROCEDURE — A9577 INJ MULTIHANCE: HCPCS | Performed by: PHYSICIAN ASSISTANT

## 2024-07-11 PROCEDURE — 70553 MRI BRAIN STEM W/O & W/DYE: CPT

## 2024-07-11 RX ADMIN — GADOBENATE DIMEGLUMINE 20 ML: 529 INJECTION, SOLUTION INTRAVENOUS at 16:17

## 2024-07-12 ENCOUNTER — HOSPITAL ENCOUNTER (OUTPATIENT)
Dept: CARDIOLOGY | Facility: HOSPITAL | Age: 44
Discharge: HOME | End: 2024-07-12
Admitting: INTERNAL MEDICINE
Payer: COMMERCIAL

## 2024-07-12 DIAGNOSIS — R00.2 PALPITATIONS: ICD-10-CM

## 2024-07-12 PROCEDURE — 93246 EXT ECG>7D<15D RECORDING: CPT

## 2024-07-21 DIAGNOSIS — G43.719 INTRACTABLE CHRONIC MIGRAINE WITHOUT AURA AND WITHOUT STATUS MIGRAINOSUS: ICD-10-CM

## 2024-07-22 RX ORDER — SUMATRIPTAN 50 MG/1
TABLET, FILM COATED ORAL
Qty: 9 TABLET | Refills: 1 | Status: SHIPPED | OUTPATIENT
Start: 2024-07-22

## 2024-07-25 ENCOUNTER — TELEPHONE (OUTPATIENT)
Dept: NEUROLOGY | Facility: CLINIC | Age: 44
End: 2024-07-25
Payer: COMMERCIAL

## 2024-07-25 NOTE — TELEPHONE ENCOUNTER
Caller: Talita English    Relationship: Self    Best call back number: [phone]    What was the call regarding: PATIENT IS CALLING REGARDING THE PA FOR THE QULIPTA. THE PHARMACY STATED THEY HAVE RECEIVED ANY AUTH FOR THE MEDICATION FOR THE INS TO COVER.     PLEASE REVIEW  THANK YOU

## 2024-07-25 NOTE — TELEPHONE ENCOUNTER
Caller: Talita English    Relationship: Self    Best call back number: [phone] (home)     What was the call regarding: PATIENT IS WANTING TO SPEAK TO SOMEONE TO HELP CLARIFY THE RESULTS OF HER MRI.     PLEASE REVIEW  THANK YOU

## 2024-07-26 NOTE — TELEPHONE ENCOUNTER
Talita said she has 1 or 2 headaches each week, they last 1 or 2 days.  Explained that I will fax that information to her insurance company.

## 2024-08-09 ENCOUNTER — TELEPHONE (OUTPATIENT)
Dept: NEUROLOGY | Facility: CLINIC | Age: 44
End: 2024-08-09
Payer: COMMERCIAL

## 2024-08-09 NOTE — TELEPHONE ENCOUNTER
Caller: Talita English    Relationship: Self    Best call back number: [phone]    What was the call regarding: PT CALLING TO CHECK THE STATUS OF THE [employer] PA APPEAL.    Do you require a callback: YES, PLEASE.    PLEASE REVIEW AND ADVISE.

## 2024-08-25 DIAGNOSIS — I10 ESSENTIAL HYPERTENSION: ICD-10-CM

## 2024-08-26 RX ORDER — LEVOTHYROXINE SODIUM 50 UG/1
50 TABLET ORAL DAILY
Qty: 90 TABLET | Refills: 3 | Status: SHIPPED | OUTPATIENT
Start: 2024-08-26

## 2024-08-26 RX ORDER — METOPROLOL TARTRATE 25 MG/1
25 TABLET, FILM COATED ORAL 2 TIMES DAILY
Qty: 180 TABLET | Refills: 3 | Status: SHIPPED | OUTPATIENT
Start: 2024-08-26

## 2024-09-02 LAB
NCCN CRITERIA FLAG: NORMAL
TYRER CUZICK SCORE: 10.8

## 2024-09-03 ENCOUNTER — HOSPITAL ENCOUNTER (OUTPATIENT)
Dept: MAMMOGRAPHY | Facility: HOSPITAL | Age: 44
Discharge: HOME | End: 2024-09-03
Admitting: NURSE PRACTITIONER
Payer: COMMERCIAL

## 2024-09-03 DIAGNOSIS — Z12.31 ENCOUNTER FOR SCREENING MAMMOGRAM FOR MALIGNANT NEOPLASM OF BREAST: ICD-10-CM

## 2024-09-03 PROCEDURE — 77067 SCR MAMMO BI INCL CAD: CPT

## 2024-09-03 PROCEDURE — 77063 BREAST TOMOSYNTHESIS BI: CPT

## 2024-09-08 NOTE — PROGRESS NOTES
Subjective    Ms. English is 39 y.o. female    Chief Complaint: Hematuria     History of Present Illness     Hematuria  Patient complains of microscopic hematuria. Onset of hematuria was a few months ago and was sudden in onset. There is not a history of nephrolithiasis. There is not a history of urologic trauma. Other urologic symptoms include frequency, incomplete emptying. Patient admits to history of no risk factors for cancer. Patient denies history of previous infection. Prior workup has been UA. Prior workup has revealed no etiology.      The following portions of the patient's history were reviewed and updated as appropriate: allergies, current medications, past family history, past medical history, past social history, past surgical history and problem list.    Review of Systems   Constitutional: Negative for appetite change, chills, fatigue, fever and unexpected weight change.   HENT: Negative for congestion, dental problem, ear pain, hearing loss, nosebleeds, sinus pressure and trouble swallowing.    Eyes: Negative for pain, discharge, redness and itching.   Respiratory: Negative for apnea, cough, choking and shortness of breath.    Cardiovascular: Negative for chest pain and palpitations.   Gastrointestinal: Negative for abdominal distention, abdominal pain, blood in stool, constipation, diarrhea, nausea and vomiting.   Endocrine: Negative for cold intolerance and heat intolerance.   Genitourinary: Positive for difficulty urinating, frequency and pelvic pain. Negative for decreased urine volume, dyspareunia, dysuria, enuresis, flank pain, genital sores, hematuria, menstrual problem, urgency, vaginal bleeding, vaginal discharge and vaginal pain.   Musculoskeletal: Negative for arthralgias, back pain and gait problem.   Skin: Negative for pallor, rash and wound.   Allergic/Immunologic: Negative for immunocompromised state.   Neurological: Negative for dizziness, tremors, seizures, weakness, numbness and  headaches.   Hematological: Negative for adenopathy. Does not bruise/bleed easily.   Psychiatric/Behavioral: Negative for agitation, behavioral problems, hallucinations, self-injury and suicidal ideas.         Current Outpatient Medications:   •  cyclobenzaprine (FLEXERIL) 10 MG tablet, Take 10 mg by mouth 3 (Three) Times a Day As Needed for Muscle Spasms., Disp: , Rfl:   •  Erenumab-aooe (AIMOVIG SC), Inject  under the skin into the appropriate area as directed Every 30 (Thirty) Days., Disp: , Rfl:   •  hydrOXYzine (ATARAX) 25 MG tablet, Take 25 mg by mouth Daily., Disp: , Rfl: 1  •  levETIRAcetam (KEPPRA) 500 MG tablet, Take 1 tablet in AM and 2 tablets in PM, Disp: 90 tablet, Rfl: 5  •  Levonorgest-Eth Estrad 91-Day (AMETHIA) 0.15-0.03 &0.01 MG tablet, Take 1 tablet by mouth Daily., Disp: , Rfl:   •  levothyroxine (SYNTHROID, LEVOTHROID) 25 MCG tablet, Take 25 mcg by mouth daily., Disp: , Rfl:   •  memantine (NAMENDA) 5 MG tablet, TAKE 1 TABLET BY MOUTH DAILY, Disp: 60 tablet, Rfl: 5  •  naproxen (NAPROSYN) 500 MG tablet, Take 1 tablet by mouth 2 (Two) Times a Day As Needed for Moderate Pain  or Headache., Disp: 30 tablet, Rfl: 5  •  pantoprazole (PROTONIX) 40 MG EC tablet, Take 40 mg by mouth 30 minute prior to breakfast, Disp: 30 tablet, Rfl: 3  •  pregabalin (LYRICA) 75 MG capsule, Take 75 mg by mouth 3 (Three) Times a Day., Disp: , Rfl:   •  prochlorperazine (COMPAZINE) 5 MG tablet, TAKE 1 TABLET BY MOUTH EVERY 8 HOURS AS NEEDED FOR NAUSEA OR VOMITING, Disp: 60 tablet, Rfl: 1  •  promethazine (PHENERGAN) 25 MG tablet, Take 25 mg by mouth Every 6 (Six) Hours As Needed for Nausea or Vomiting., Disp: , Rfl:   •  SUMAtriptan (IMITREX) 100 MG tablet, Take 1 tablet by mouth 1 (One) Time As Needed for Migraine (Do not exceed 2 in 24 hours.)., Disp: 9 tablet, Rfl: 5    Past Medical History:   Diagnosis Date   • Disease of thyroid gland     hypothyroid   • Fibromyalgia    • GERD (gastroesophageal reflux disease)    •  "Migraines        Past Surgical History:   Procedure Laterality Date   •  SECTION     • ENDOSCOPY N/A 2019    Normal 1st and 2nd portions of the duodenum-biopsied; Normal stomach; LA grade A esophagitis; No esophageal abnormality to explain patient's dysphagia-esophagus dilated        Social History     Socioeconomic History   • Marital status:      Spouse name: Not on file   • Number of children: Not on file   • Years of education: Not on file   • Highest education level: Not on file   Tobacco Use   • Smoking status: Never Smoker   • Smokeless tobacco: Never Used   Substance and Sexual Activity   • Alcohol use: No   • Drug use: No   • Sexual activity: Defer       Family History   Problem Relation Age of Onset   • No Known Problems Mother    • Cancer Father    • Colon cancer Paternal Uncle    • Colon polyps Neg Hx        Objective    /89   Temp 98 °F (36.7 °C)   Ht 157.5 cm (62\")   Wt 99.8 kg (220 lb)   BMI 40.24 kg/m²     Physical Exam   Constitutional: She is oriented to person, place, and time. She appears well-developed and well-nourished. No distress.   HENT:   Head: Normocephalic and atraumatic.   Right Ear: External ear and ear canal normal.   Left Ear: External ear and ear canal normal.   Nose: No nasal deformity. No epistaxis.   Mouth/Throat: Oropharynx is clear and moist. Mucous membranes are not pale, not dry and not cyanotic. Normal dentition. No oropharyngeal exudate.   Neck: Trachea normal. No tracheal tenderness present. No tracheal deviation present. No thyroid mass and no thyromegaly present.   Pulmonary/Chest: Effort normal. No accessory muscle usage. No respiratory distress. Chest wall is not dull to percussion (No flatness or hyperresonance). She exhibits no mass and no tenderness.   On palpation, no tactile fremitus. All movements are symmetric. No intercostal retraction noted.    Abdominal: Soft. Normal appearance. She exhibits no distension and no mass. There is no " hepatosplenomegaly. There is no tenderness. No hernia.   Rectal examination or stool specimen is not indicated.    Musculoskeletal:   Normal gait and station. The spine, ribs, and pelvis are examined. No obvious misalignment or asymmetry. ROM is reasonable for age. No instability. No obvious atrophy, flaccidity or spasticity.    Lymphadenopathy:     She has no cervical adenopathy.        Right: No inguinal adenopathy present.        Left: No inguinal adenopathy present.   Neurological: She is alert and oriented to person, place, and time.   Skin: Skin is warm, dry and intact. No lesion and no rash noted. She is not diaphoretic. No cyanosis. No pallor. Nails show no clubbing.   On palpation, there were no induration, subcutaneous nodules, or tightening   Psychiatric: Her speech is normal and behavior is normal. Judgment and thought content normal. Her mood appears not anxious. Her affect is not labile. She does not exhibit a depressed mood.   Vitals reviewed.          Results for orders placed or performed in visit on 03/08/19   POC Urinalysis Dipstick, Multipro   Result Value Ref Range    Color Yellow Yellow, Straw, Dark Yellow, Patricia    Clarity, UA Clear Clear    Glucose, UA Negative Negative, 1000 mg/dL (3+) mg/dL    Bilirubin Negative Negative    Ketones, UA Negative Negative    Specific Gravity  1.010 1.005 - 1.030    Blood, UA Small (A) Negative    pH, Urine 7.0 5.0 - 8.0    Protein, POC Negative Negative mg/dL    Urobilinogen, UA Normal Normal    Nitrite, UA Negative Negative    Leukocytes Small (1+) (A) Negative   Patient's Body mass index is 40.24 kg/m². BMI is above normal parameters. Recommendations include: educational material.  Microscopic Urinalysis  I inspected the urine myself based on the clinical situation including the dipstick urine. The urine is spun in a centrifuge for three minutes. The spun urine shows 3-6 rbc/hpf, 3-6 wbc/hpf, none epi/hpf, negative bacteria, negative crystals, and negative  No lilia.     Assessment and Plan    Diagnoses and all orders for this visit:    Microscopic hematuria  -     POC Urinalysis Dipstick, Multipro    Frequency of micturition    Dyspareunia, female        Pt. On Atarax for possible IC with no improvements in her symptoms.  She is having significant pelvic pain is worse with intercourse.  She is also having pelvic pain that improves after she voids.  She has irritative symptoms frequency urgency and nocturia.    She does have microscopic hematuria we will do full hematuria workup and then decide on how to manage IC from there.

## 2024-10-02 ENCOUNTER — TELEPHONE (OUTPATIENT)
Dept: NEUROLOGY | Facility: CLINIC | Age: 44
End: 2024-10-02
Payer: COMMERCIAL

## 2024-10-21 DIAGNOSIS — G43.719 INTRACTABLE CHRONIC MIGRAINE WITHOUT AURA AND WITHOUT STATUS MIGRAINOSUS: ICD-10-CM

## 2024-10-21 RX ORDER — NAPROXEN 500 MG/1
500 TABLET ORAL 2 TIMES DAILY PRN
Qty: 60 TABLET | Refills: 1 | Status: SHIPPED | OUTPATIENT
Start: 2024-10-21 | End: 2024-10-23 | Stop reason: SDUPTHER

## 2024-10-21 RX ORDER — SUMATRIPTAN 50 MG/1
TABLET, FILM COATED ORAL
Qty: 9 TABLET | Refills: 1 | Status: SHIPPED | OUTPATIENT
Start: 2024-10-21 | End: 2024-10-23 | Stop reason: SDUPTHER

## 2024-10-23 ENCOUNTER — OFFICE VISIT (OUTPATIENT)
Dept: NEUROLOGY | Facility: CLINIC | Age: 44
End: 2024-10-23
Payer: COMMERCIAL

## 2024-10-23 VITALS
RESPIRATION RATE: 18 BRPM | DIASTOLIC BLOOD PRESSURE: 74 MMHG | BODY MASS INDEX: 40.85 KG/M2 | SYSTOLIC BLOOD PRESSURE: 120 MMHG | HEIGHT: 62 IN | WEIGHT: 222 LBS | HEART RATE: 82 BPM

## 2024-10-23 DIAGNOSIS — G43.909 EPISODIC MIGRAINE: ICD-10-CM

## 2024-10-23 DIAGNOSIS — G43.719 INTRACTABLE CHRONIC MIGRAINE WITHOUT AURA AND WITHOUT STATUS MIGRAINOSUS: Primary | ICD-10-CM

## 2024-10-23 RX ORDER — SUMATRIPTAN 100 MG/1
100 TABLET, FILM COATED ORAL AS NEEDED
Qty: 16 TABLET | Refills: 3 | Status: SHIPPED | OUTPATIENT
Start: 2024-10-23

## 2024-10-23 RX ORDER — LEVETIRACETAM 500 MG/1
TABLET ORAL
Qty: 270 TABLET | Refills: 1 | Status: SHIPPED | OUTPATIENT
Start: 2024-10-23

## 2024-10-23 RX ORDER — MEMANTINE HYDROCHLORIDE 10 MG/1
10 TABLET ORAL 2 TIMES DAILY
Qty: 180 TABLET | Refills: 1 | Status: SHIPPED | OUTPATIENT
Start: 2024-10-23

## 2024-10-23 RX ORDER — NAPROXEN 500 MG/1
500 TABLET ORAL 2 TIMES DAILY PRN
Qty: 60 TABLET | Refills: 1 | Status: SHIPPED | OUTPATIENT
Start: 2024-10-23

## 2024-10-23 RX ORDER — PREDNISONE 10 MG/1
TABLET ORAL
Qty: 45 TABLET | Refills: 0 | Status: SHIPPED | OUTPATIENT
Start: 2024-10-23

## 2024-11-16 NOTE — PROGRESS NOTES
Subjective   Talita English is a 44 y.o. female is here today for follow-up for difficult to control migraines.    History of Present Illness  She returns in follow-up, continuing to have bothersome migraines despite a fairly robust prevention strategy.  Just finished a prednisone taper for fairly significant breakthrough.       The following portions of the patient's history were reviewed and updated as appropriate: allergies, current medications, past family history, past medical history, past social history, past surgical history and problem list.    Review of Systems   Constitutional:  Positive for fatigue.   HENT:  Positive for congestion.    Eyes:  Positive for photophobia and visual disturbance.   Respiratory: Negative.     Cardiovascular: Negative.    Gastrointestinal:  Positive for nausea and vomiting.   Musculoskeletal:  Positive for myalgias and neck stiffness.   Allergic/Immunologic: Positive for environmental allergies.   Neurological:  Positive for dizziness and headache.   Psychiatric/Behavioral: Negative.           Current Outpatient Medications:     cyclobenzaprine (FLEXERIL) 10 MG tablet, TAKE 1 TABLET BY MOUTH THREE TIMES DAILY AS NEEDED FOR MUSCLE SPASMS, Disp: 270 tablet, Rfl: 1    Erenumab-aooe (Aimovig) 140 MG/ML auto-injector, Inject 1 mL under the skin into the appropriate area as directed Every 30 (Thirty) Days., Disp: 1 mL, Rfl: 6    fluticasone (FLONASE) 50 MCG/ACT nasal spray, 2 sprays into the nostril(s) as directed by provider Daily., Disp: 11.1 mL, Rfl: 2    levETIRAcetam (KEPPRA) 500 MG tablet, TAKE 1 TABLET BY MOUTH EVERY MORNING THEN TAKE 2 TABLETS BY MOUTH EVERY EVENING, Disp: 270 tablet, Rfl: 1    Levonorgest-Eth Estrad 91-Day (Simpesse) 0.15-0.03 &0.01 MG, Take 1 tablet by mouth Daily., Disp: 91 each, Rfl: 3    levothyroxine (SYNTHROID, LEVOTHROID) 50 MCG tablet, TAKE 1 TABLET BY MOUTH DAILY, Disp: 90 tablet, Rfl: 3    memantine (NAMENDA) 10 MG tablet, Take 1 tablet by mouth  2 (Two) Times a Day., Disp: 180 tablet, Rfl: 1    metoprolol tartrate (LOPRESSOR) 25 MG tablet, TAKE 1 TABLET BY MOUTH TWICE DAILY, Disp: 180 tablet, Rfl: 3    naproxen (NAPROSYN) 500 MG tablet, Take 1 tablet by mouth 2 (Two) Times a Day As Needed for Mild Pain., Disp: 60 tablet, Rfl: 1    prochlorperazine (COMPAZINE) 5 MG tablet, Take 1 tablet by mouth Every 6 (Six) Hours As Needed for Nausea or Vomiting., Disp: , Rfl:     Rimegepant Sulfate (Nurtec) 75 MG tablet dispersible tablet, Take 1 tablet by mouth Daily As Needed (migraine)., Disp: 16 tablet, Rfl: 3    SUMAtriptan (IMITREX) 100 MG tablet, Take 1 tablet by mouth As Needed for Migraine. TAKE 1 TABLET BY MOUTH AT ONSET OF MIGRAINE. MAY REPEAT IN 2 HOURS AS NEEDED. NOT TO EXCEED 2 DOSES IN 24 HOURS, Disp: 16 tablet, Rfl: 3    predniSONE (DELTASONE) 10 MG tablet, 50 mg for 3 days, 40 mg for 3 days, 30 mg for 3 days, 20 mg for 3 days, 10 mg for 3 days, then D/C, Disp: 45 tablet, Rfl: 0     Objective   Physical Exam  Vitals and nursing note reviewed.   Eyes:      General: Vision grossly intact. Gaze aligned appropriately.   Cardiovascular:      Rate and Rhythm: Normal rate.   Pulmonary:      Effort: Pulmonary effort is normal.   Neurological:      Mental Status: She is alert and oriented to person, place, and time.      GCS: GCS eye subscore is 4. GCS verbal subscore is 5. GCS motor subscore is 6.      Cranial Nerves: Cranial nerves 2-12 are intact.      Sensory: Sensation is intact.      Motor: Motor function is intact.      Coordination: Coordination is intact.      Gait: Gait is intact.      Deep Tendon Reflexes:      Reflex Scores:       Bicep reflexes are 2+ on the right side and 2+ on the left side.       Brachioradialis reflexes are 2+ on the right side and 2+ on the left side.       Patellar reflexes are 2+ on the right side and 2+ on the left side.  Psychiatric:         Attention and Perception: Attention normal.         Mood and Affect: Mood normal.          Speech: Speech normal.         Behavior: Behavior normal.         Cognition and Memory: Cognition normal.           Assessment & Plan   Diagnoses and all orders for this visit:    1. Intractable chronic migraine without aura and without status migrainosus (Primary)  -     memantine (NAMENDA) 10 MG tablet; Take 1 tablet by mouth 2 (Two) Times a Day.  Dispense: 180 tablet; Refill: 1  -     levETIRAcetam (KEPPRA) 500 MG tablet; TAKE 1 TABLET BY MOUTH EVERY MORNING THEN TAKE 2 TABLETS BY MOUTH EVERY EVENING  Dispense: 270 tablet; Refill: 1    2. Episodic migraine  -     SUMAtriptan (IMITREX) 100 MG tablet; Take 1 tablet by mouth As Needed for Migraine. TAKE 1 TABLET BY MOUTH AT ONSET OF MIGRAINE. MAY REPEAT IN 2 HOURS AS NEEDED. NOT TO EXCEED 2 DOSES IN 24 HOURS  Dispense: 16 tablet; Refill: 3  -     naproxen (NAPROSYN) 500 MG tablet; Take 1 tablet by mouth 2 (Two) Times a Day As Needed for Mild Pain.  Dispense: 60 tablet; Refill: 1  -     predniSONE (DELTASONE) 10 MG tablet; 50 mg for 3 days, 40 mg for 3 days, 30 mg for 3 days, 20 mg for 3 days, 10 mg for 3 days, then D/C  Dispense: 45 tablet; Refill: 0        Continue to recommend Aimovig 140 mg subcu monthly, Keppra 500 mg taking 1 in the morning and 2 in the evening, memantine 10 mg twice per day this regimen has reduced her migraines.  I have discussed some newer medications which may be beneficial but have been hard for her to obtain administratively.    Continue treatment of episodic breakthrough migraines with Imitrex, Naprosyn, Compazine when needed and occasional prednisone tapers.  Return follow-up as scheduled.             Dictated utilizing Dragon dictation.

## 2024-12-01 DIAGNOSIS — G43.719 INTRACTABLE CHRONIC MIGRAINE WITHOUT AURA AND WITHOUT STATUS MIGRAINOSUS: ICD-10-CM

## 2024-12-02 RX ORDER — ERENUMAB-AOOE 140 MG/ML
INJECTION, SOLUTION SUBCUTANEOUS
Qty: 1 ML | Refills: 4 | Status: SHIPPED | OUTPATIENT
Start: 2024-12-02

## 2024-12-03 DIAGNOSIS — G43.719 INTRACTABLE CHRONIC MIGRAINE WITHOUT AURA AND WITHOUT STATUS MIGRAINOSUS: ICD-10-CM

## 2024-12-04 RX ORDER — RIMEGEPANT SULFATE 75 MG/75MG
TABLET, ORALLY DISINTEGRATING ORAL
Qty: 16 TABLET | Refills: 4 | Status: SHIPPED | OUTPATIENT
Start: 2024-12-04

## 2024-12-04 NOTE — TELEPHONE ENCOUNTER
[facility]     Pharmacy:   [id number] - [place], [place] - [facility] AT [facility] [address] - [phone]  - [phone] FX

## 2024-12-11 ENCOUNTER — OFFICE VISIT (OUTPATIENT)
Dept: INTERNAL MEDICINE | Facility: CLINIC | Age: 44
End: 2024-12-11
Payer: COMMERCIAL

## 2024-12-11 VITALS
HEIGHT: 62 IN | HEART RATE: 68 BPM | BODY MASS INDEX: 41.22 KG/M2 | TEMPERATURE: 97.9 F | OXYGEN SATURATION: 97 % | SYSTOLIC BLOOD PRESSURE: 114 MMHG | WEIGHT: 224 LBS | DIASTOLIC BLOOD PRESSURE: 80 MMHG

## 2024-12-11 DIAGNOSIS — G47.30 SLEEP APNEA, UNSPECIFIED TYPE: ICD-10-CM

## 2024-12-11 DIAGNOSIS — I10 ESSENTIAL HYPERTENSION: Primary | ICD-10-CM

## 2024-12-11 DIAGNOSIS — R73.03 PREDIABETES: ICD-10-CM

## 2024-12-11 DIAGNOSIS — G43.719 INTRACTABLE CHRONIC MIGRAINE WITHOUT AURA AND WITHOUT STATUS MIGRAINOSUS: ICD-10-CM

## 2024-12-11 DIAGNOSIS — E03.9 HYPOTHYROIDISM, UNSPECIFIED TYPE: ICD-10-CM

## 2024-12-11 DIAGNOSIS — E66.01 MORBID OBESITY: ICD-10-CM

## 2024-12-11 PROCEDURE — 99396 PREV VISIT EST AGE 40-64: CPT | Performed by: INTERNAL MEDICINE

## 2024-12-12 NOTE — PROGRESS NOTES
"      Chief Complaint  Annual Exam (Has labs done this AM)    Subjective        Talita English presents to [facility]    HPI    Patient here for the above problems.  See Assessment and Plan for further HPI components.      Review of Systems    Objective   Vital Signs:  /80 (BP Location: Left arm, Patient Position: Sitting, Cuff Size: Adult)   Pulse 68   Temp 97.9 °F (36.6 °C) (Temporal)   Ht 157.5 cm (62\")   Wt 102 kg (224 lb)   SpO2 97%   BMI 40.97 kg/m²   Estimated body mass index is 40.97 kg/m² as calculated from the following:    Height as of this encounter: 157.5 cm (62\").    Weight as of this encounter: 102 kg (224 lb).      Physical Exam  Vitals and nursing note reviewed.   Constitutional:       Appearance: She is obese. She is not ill-appearing.   Eyes:      General: No scleral icterus.     Conjunctiva/sclera: Conjunctivae normal.   Pulmonary:      Effort: Pulmonary effort is normal. No respiratory distress.   Skin:     General: Skin is warm.      Coloration: Skin is not pale.   Neurological:      General: No focal deficit present.      Mental Status: She is alert and oriented to person, place, and time.   Psychiatric:         Mood and Affect: Mood normal.         Behavior: Behavior normal.                       Assessment and Plan   Diagnoses and all orders for this visit:    1. Essential hypertension (Primary)    2. Morbid obesity    3. Prediabetes    4. Hypothyroidism, unspecified type    5. Sleep apnea, unspecified type    6. Intractable chronic migraine without aura and without status migrainosus      Recommend at least annual dental and vision screening.  Recommend annual influenza vaccination  Recommend a varied diet and appropriate portion sizes.   CDC recommendations for physical activity:  At least 150 minutes a week (for example, 30 minutes a day, 5 days a week) of moderate-intensity activity such as brisk walking. Or can consider 75 minutes a week of " vigorous-intensity activity such as hiking, jogging, or running.  At least 2 days a week of activities that strengthen muscles.  Plus activities to improve balance.    Health Maintenance Due   Topic Date Due    COVID-19 Vaccine (4 - 2024-25 season) 09/01/2024         History of Present Illness  The patient presents for evaluation of migraines, weight management, and left-sided symptoms.    She reports experiencing severe headaches, which she has been managing with Botox treatments. These treatments were initially effective for a few months but have since lost their efficacy. She also takes Nurtec every other day, which provides some relief. Aimovig has been beneficial as well, but she experiences periods without medication due to insurance issues. She has been dealing with migraines for 27 years and has learned to manage them. She has expressed interest in trying new treatments, including an infusion she saw advertised, but was informed that insurance coverage might be difficult to obtain. She frequently experiences floaters in her right eye, which seem to correlate with her migraines and have been present for several years. Botox treatments, Nurtec every other day, Aimovig.    In May 2024, she experienced an episode of numbness and loss of  on her left side, accompanied by weakness and discoordination. This was a new symptom for her, as her previous migraines had always affected her right side. She has also experienced temporary loss of use of her right leg during severe migraines, which typically resolve after a few hours. She underwent an MRI scan, which revealed a spot that was not discussed in detail. She has had several MRIs over the past few years, one of which mentioned two spots, but subsequent scans have only shown one spot. She has not had any issues on her left side since the May 2024 episode, but has experienced right leg weakness a few times. She has not had any rashes on her legs, but does have  eczema-like patches on her arms and legs. She has not had any recent lab work done by rheumatology. She recalls having strange symptoms when her daughter was small, which led to a hospital stay and testing, including a spinal tap, to rule out multiple sclerosis. All tests were normal. She was referred to a rheumatologist a few years later and was prescribed medication, which led to weight gain. She occasionally experiences a red malar rash, which was suspected to be lupus, but no definitive diagnosis was made.    She is up to date on her Pap smears, with the next one scheduled for January 2025. Her last mammogram was conducted a few months ago and yielded normal results. She is currently attempting to incorporate more physical activity into her daily routine, including climbing 10 to 15 flights of stairs. However, she often finds herself too busy to eat throughout the day, typically consuming her first meal around 5:30 or 6:00 PM. She acknowledges the need to improve her eating habits, particularly by incorporating more meals during the day. She occasionally prepares breakfast in advance, such as an egg or a piece of rodriguez, but generally lacks appetite in the morning. Her daily routine includes one cup of black coffee in the morning.    Supplemental Information  She underwent a sleep study in July 2024, which revealed borderline results. A machine was recommended, but she did not purchase it due to financial constraints.    SOCIAL HISTORY  She is a physical therapist.    MEDICATIONS  Current: Nurtec, Aimovig      Assessment & Plan  1. Migraines.  The patient reports that Botox initially worked for a few months but then leveled off. She takes Nurtec every other day, which helps, and also uses Aimovig. Insurance inconsistencies have caused interruptions in her medication regimen. She experienced a complex migraine in May 2024, which mimicked stroke symptoms, including numbness and loss of  on her left side. This  was a new symptom, as her migraines typically affect her right side. She has a history of right-sided migraines with occasional right leg weakness. She is advised to explore patient assistance programs on the 's website to mitigate insurance-related issues. She is advised to monitor for slurred speech and visual changes. She is encouraged to maintain a regular exercise routine, as it may help manage her migraines. She is also advised to avoid snacking after dinner and to incorporate a small snack between lunch and dinner to reduce her evening meal intake. She is advised to consider protein shakes with caffeine as a breakfast option. She is advised to monitor for visual changes and slurred speech. If she experiences sensory or motor symptoms again, she should take notes on potential triggers such as dehydration, food intake, and alcohol consumption. She is advised to continue monitoring her symptoms and to take detailed notes if she experiences any changes. A second opinion from a different neurologist can be sought if necessary.    2. Weight management.  Obesity  Her weight remains stable. She is advised to maintain a regular exercise routine, as it may help manage her migraines. She is also advised to avoid snacking after dinner and to incorporate a small snack between lunch and dinner to reduce her evening meal intake. She is advised to consider protein shakes with caffeine as a breakfast option.    Patient has a BMI > 30.  Obesity increases risks of diseases such as diabetes, coronary artery disease, hypertension, sleep apnea, hyperlipidemia, arthritis, and stroke.  Recommend focusing on portion control and making healthy diet choices.  Avoid fast food.  Avoid calorie beverages including sweet tea, juice, soft drinks, and alcohol.  Recommend increasing your activity and starting a regular exercise program. Can consider utilizing calorie counting apps such as [employer].          3. Left-sided  sensory.  The patient experienced left-sided numbness and loss of  in May 2024, which was a new symptom. This episode was associated with her usual right-sided migraine.  The MRI showed no active demyelination, but the presence of a lesion could indicate old possible demylinating lesion from reviewing the previous MRI reports.  The patient's condition is currently stable, and there is no need for immediate concern.  She is advised to monitor for visual changes and slurred speech. If she experiences sensory or motor symptoms again, she should take notes on potential triggers such as dehydration, food intake, and alcohol consumption. She is advised to continue monitoring her symptoms and to take detailed notes if she experiences any changes. A second opinion from a different neurologist can be sought if necessary.  Discussed with one of neurology providers who reviewed MRI for me.  They did not feel like it looked like MS.    Labs completed today.      Result Review :                   Class 3 Severe Obesity (BMI >=40). Obesity-related health conditions include the following: obstructive sleep apnea. Obesity is unchanged. BMI is is above average; BMI management plan is completed. We discussed portion control and increasing exercise.            Follow Up   Return in about 6 months (around 6/11/2025), or if symptoms worsen or fail to improve, for follow up for above problems. Longitudinal care..  Patient was given instructions and counseling regarding her condition or for health maintenance advice. Please see specific information pulled into the AVS if appropriate.       KENJI Canela MD, FACP, [facility]      Electronically signed by Grant Canela MD, 12/11/24, 7:40 PM CST.    Patient or patient representative verbalized consent for the use of Ambient Listening during the visit with  Grant Canela MD for chart documentation. 12/11/2024  19:45 CST

## 2025-01-08 DIAGNOSIS — G43.829 MENSTRUAL MIGRAINE WITHOUT STATUS MIGRAINOSUS, NOT INTRACTABLE: ICD-10-CM

## 2025-01-08 RX ORDER — LEVONORGESTREL / ETHINYL ESTRADIOL AND ETHINYL ESTRADIOL 150-30(84)
1 KIT ORAL DAILY
Qty: 91 EACH | Refills: 0 | Status: SHIPPED | OUTPATIENT
Start: 2025-01-08

## 2025-01-08 NOTE — TELEPHONE ENCOUNTER
JONAH TY    [phone]    [id number] - [place], [place] - [address] AT [facility] [address] - [phone] PH - [phone] FX  [address] [place] [address]  Phone: [phone]  Fax: [phone]

## 2025-01-24 DIAGNOSIS — G43.719 INTRACTABLE CHRONIC MIGRAINE WITHOUT AURA AND WITHOUT STATUS MIGRAINOSUS: ICD-10-CM

## 2025-04-03 ENCOUNTER — OFFICE VISIT (OUTPATIENT)
Dept: OBSTETRICS AND GYNECOLOGY | Age: 45
End: 2025-04-03
Payer: COMMERCIAL

## 2025-04-03 VITALS
SYSTOLIC BLOOD PRESSURE: 124 MMHG | HEIGHT: 62 IN | BODY MASS INDEX: 40.48 KG/M2 | WEIGHT: 220 LBS | DIASTOLIC BLOOD PRESSURE: 88 MMHG

## 2025-04-03 DIAGNOSIS — Z30.41 SURVEILLANCE OF CONTRACEPTIVE PILL: ICD-10-CM

## 2025-04-03 DIAGNOSIS — Z12.4 SCREENING FOR CERVICAL CANCER: ICD-10-CM

## 2025-04-03 DIAGNOSIS — G43.829 MENSTRUAL MIGRAINE WITHOUT STATUS MIGRAINOSUS, NOT INTRACTABLE: ICD-10-CM

## 2025-04-03 DIAGNOSIS — Z12.31 ENCOUNTER FOR SCREENING MAMMOGRAM FOR BREAST CANCER: ICD-10-CM

## 2025-04-03 DIAGNOSIS — Z01.419 WELL WOMAN EXAM WITH ROUTINE GYNECOLOGICAL EXAM: Primary | ICD-10-CM

## 2025-04-03 PROCEDURE — 87624 HPV HI-RISK TYP POOLED RSLT: CPT | Performed by: NURSE PRACTITIONER

## 2025-04-03 PROCEDURE — G0123 SCREEN CERV/VAG THIN LAYER: HCPCS | Performed by: NURSE PRACTITIONER

## 2025-04-03 RX ORDER — LEVONORGESTREL / ETHINYL ESTRADIOL AND ETHINYL ESTRADIOL 150-30(84)
1 KIT ORAL DAILY
Qty: 91 EACH | Refills: 3 | Status: SHIPPED | OUTPATIENT
Start: 2025-04-03

## 2025-04-03 NOTE — PROGRESS NOTES
"Chief Complaint   Patient presents with    Gynecologic Exam     Patient here for annual, last pap 12/8/22, mammogram 9/3/24, patient denies any problems or concerns           Subjective     Talita English is a 45 y.o. female    History of Present Illness  Pt comes in today for annual wellness exam. Denies any problems.     /88 (BP Location: Left arm, Patient Position: Sitting, Cuff Size: Adult)   Ht 157.5 cm (62\")   Wt 99.8 kg (220 lb)   LMP  (LMP Unknown)   BMI 40.24 kg/m²     Outpatient Encounter Medications as of 4/3/2025   Medication Sig Dispense Refill    Aimovig 140 MG/ML auto-injector ADMINISTER 1 ML UNDER THE SKIN EVERY 30 DAYS AS DIRECTED 1 mL 4    cyclobenzaprine (FLEXERIL) 10 MG tablet TAKE 1 TABLET BY MOUTH THREE TIMES DAILY AS NEEDED FOR MUSCLE SPASMS 270 tablet 1    fluticasone (FLONASE) 50 MCG/ACT nasal spray 2 sprays into the nostril(s) as directed by provider Daily. 11.1 mL 2    levETIRAcetam (KEPPRA) 500 MG tablet TAKE 1 TABLET BY MOUTH EVERY MORNING THEN TAKE 2 TABLETS BY MOUTH EVERY EVENING 270 tablet 1    Levonorgest-Eth Estrad 91-Day (Simpesse) 0.15-0.03 &0.01 MG Take 1 tablet by mouth Daily. 91 each 3    levothyroxine (SYNTHROID, LEVOTHROID) 50 MCG tablet TAKE 1 TABLET BY MOUTH DAILY 90 tablet 3    memantine (NAMENDA) 10 MG tablet Take 1 tablet by mouth 2 (Two) Times a Day. 180 tablet 1    metoprolol tartrate (LOPRESSOR) 25 MG tablet TAKE 1 TABLET BY MOUTH TWICE DAILY 180 tablet 3    naproxen (NAPROSYN) 500 MG tablet Take 1 tablet by mouth 2 (Two) Times a Day As Needed for Mild Pain. 60 tablet 1    prochlorperazine (COMPAZINE) 5 MG tablet Take 1 tablet by mouth Every 6 (Six) Hours As Needed for Nausea or Vomiting.      rimegepant sulfate (Nurtec) 75 MG tablet Take 1 tablet by mouth Daily As Needed (migraine). 16 tablet 4    SUMAtriptan (IMITREX) 100 MG tablet Take 1 tablet by mouth As Needed for Migraine. TAKE 1 TABLET BY MOUTH AT ONSET OF MIGRAINE. MAY REPEAT IN 2 HOURS AS " NEEDED. NOT TO EXCEED 2 DOSES IN 24 HOURS 16 tablet 3    [DISCONTINUED] Levonorgest-Eth Estrad 91-Day (Simpesse) 0.15-0.03 &0.01 MG Take 1 tablet by mouth Daily. 91 each 0    [DISCONTINUED] predniSONE (DELTASONE) 10 MG tablet 50 mg for 3 days, 40 mg for 3 days, 30 mg for 3 days, 20 mg for 3 days, 10 mg for 3 days, then D/C 45 tablet 0     No facility-administered encounter medications on file as of 4/3/2025.       Past Medical History  Past Medical History:   Diagnosis Date    Anemia 2018    Cluster headache     Difficulty walking     Disease of thyroid gland     Fibromyalgia     Fibromyalgia, primary     GERD (gastroesophageal reflux disease)     Headache, tension-type     Hypertension     Hypothyroidism See chart    Inflammatory arthritis 11/10/2023    Memory loss     Migraines     Ovarian cyst     PONV (postoperative nausea and vomiting)     Urinary tract infection     Varicella     Vision loss         Surgical History  Past Surgical History:   Procedure Laterality Date     SECTION      ENDOSCOPY N/A 2019    Normal 1st and 2nd portions of the duodenum-biopsied; Normal stomach; LA grade A esophagitis; No esophageal abnormality to explain patient's dysphagia-esophagus dilated        Family History  Family History   Problem Relation Age of Onset    Hypertension Mother     Melanoma Mother     Cancer Mother 46        Skin melanoma    Cancer Father         Bladder cancer    Heart attack Father     Heart disease Father     Arrhythmia Father     Abnormal EKG Father     Vision loss Father         Glaucoma    Colon cancer Paternal Uncle     Migraines Brother     Hypertension Brother     Heart attack Sister     Hypertension Sister     Heart disease Maternal Grandmother     Arthritis Maternal Grandmother         Rheumatoid arthritis    Asthma Maternal Grandmother     Heart attack Maternal Grandfather     Heart disease Maternal Grandfather     Heart attack Paternal Grandmother     Aneurysm Paternal  Grandfather     Stroke Paternal Grandfather          of Brain Aneurysm    Heart disease Paternal Grandfather     Diabetes Sister     Hypertension Sister     Colon polyps Neg Hx     Seizures Neg Hx     Breast cancer Neg Hx     Ovarian cancer Neg Hx     Uterine cancer Neg Hx        The following portions of the patient's history were reviewed and updated as appropriate: allergies, current medications, past family history, past medical history, past social history, past surgical history, and problem list.    Review of Systems   Constitutional:  Negative for activity change and unexpected weight loss.   HENT:  Negative for congestion.    Cardiovascular:  Negative for chest pain.   Gastrointestinal:  Negative for blood in stool, constipation and diarrhea.   Endocrine: Negative for cold intolerance and heat intolerance.   Genitourinary:  Negative for dyspareunia, pelvic pain and vaginal discharge.   Musculoskeletal:  Negative for arthralgias, back pain, neck pain and neck stiffness.   Skin:  Negative for rash.   Neurological:  Negative for dizziness and headache.   Psychiatric/Behavioral:  Negative for sleep disturbance. The patient is not nervous/anxious.        Objective   Physical Exam  Vitals and nursing note reviewed. Exam conducted with a chaperone present.   Constitutional:       General: She is not in acute distress.     Appearance: She is well-developed. She is not diaphoretic.   HENT:      Head: Normocephalic.      Right Ear: External ear normal.      Left Ear: External ear normal.      Nose: Nose normal.   Eyes:      General: No scleral icterus.        Right eye: No discharge.         Left eye: No discharge.      Conjunctiva/sclera: Conjunctivae normal.      Pupils: Pupils are equal, round, and reactive to light.   Neck:      Thyroid: No thyromegaly.      Vascular: No carotid bruit.      Trachea: No tracheal deviation.   Cardiovascular:      Rate and Rhythm: Normal rate and regular rhythm.      Heart  sounds: Normal heart sounds. No murmur heard.  Pulmonary:      Effort: Pulmonary effort is normal. No respiratory distress.      Breath sounds: Normal breath sounds. No wheezing.   Chest:   Breasts:     Breasts are symmetrical.      Right: Normal. No swelling, bleeding, inverted nipple, mass, nipple discharge, skin change or tenderness.      Left: Normal. No swelling, bleeding, inverted nipple, mass, nipple discharge, skin change or tenderness.   Abdominal:      General: There is no distension.      Palpations: Abdomen is soft. There is no mass.      Tenderness: There is no abdominal tenderness. There is no right CVA tenderness, left CVA tenderness or guarding.      Hernia: No hernia is present. There is no hernia in the left inguinal area or right inguinal area.   Genitourinary:     General: Normal vulva.      Exam position: Lithotomy position.      Labia:         Right: No rash, tenderness, lesion or injury.         Left: No rash, tenderness, lesion or injury.       Vagina: Normal. No signs of injury and foreign body. No vaginal discharge, erythema, tenderness or bleeding.      Cervix: Normal.      Uterus: Normal. Not enlarged, not fixed and not tender.       Adnexa: Right adnexa normal and left adnexa normal.        Right: No mass, tenderness or fullness.          Left: No mass, tenderness or fullness.        Rectum: Normal. No mass.      Comments:   BSU normal  Urethral meatus  Normal  Perineum  Normal  Musculoskeletal:         General: No tenderness. Normal range of motion.      Cervical back: Normal range of motion and neck supple.   Lymphadenopathy:      Head:      Right side of head: No submental, submandibular, tonsillar, preauricular, posterior auricular or occipital adenopathy.      Left side of head: No submental, submandibular, tonsillar, preauricular, posterior auricular or occipital adenopathy.      Cervical: No cervical adenopathy.      Right cervical: No superficial, deep or posterior cervical  adenopathy.     Left cervical: No superficial, deep or posterior cervical adenopathy.      Upper Body:      Right upper body: No supraclavicular, axillary or pectoral adenopathy.      Left upper body: No supraclavicular, axillary or pectoral adenopathy.      Lower Body: No right inguinal adenopathy. No left inguinal adenopathy.   Skin:     General: Skin is warm and dry.      Findings: No bruising, erythema or rash.   Neurological:      Mental Status: She is alert and oriented to person, place, and time.      Coordination: Coordination normal.   Psychiatric:         Mood and Affect: Mood normal.         Behavior: Behavior normal.         Thought Content: Thought content normal.         Judgment: Judgment normal.         Assessment & Plan   Diagnoses and all orders for this visit:    1. Well woman exam with routine gynecological exam (Primary)    2. Menstrual migraine without status migrainosus, not intractable  -     Levonorgest-Eth Estrad 91-Day (Simpesse) 0.15-0.03 &0.01 MG; Take 1 tablet by mouth Daily.  Dispense: 91 each; Refill: 3    3. Screening for cervical cancer  -     Liquid-based Pap Smear, Screening  -     HPV DNA Probe, Direct - ThinPrep Vial, Cervix, Endocervix    4. Surveillance of contraceptive pill  -     Levonorgest-Eth Estrad 91-Day (Simpesse) 0.15-0.03 &0.01 MG; Take 1 tablet by mouth Daily.  Dispense: 91 each; Refill: 3    5. Encounter for screening mammogram for breast cancer  -     Mammo Screening Digital Tomosynthesis Bilateral With CAD; Future         Normal GYN exam. Encouraged SBE, pt is aware how to do self breast exam and the importance of same. Discussed weight management and importance of maintaining a healthy weight. Discussed Vitamin D intake and the importance of adequate vitamin D for both bone health and a healthy immune system.  Discussed daily exercise and the importance of same, in regards to a healthy heart as well as helping to maintain her weight and improving her mental  health. Mammogram will be scheduled. Bone density is not indicated. Pap smear is done per ASCCP guidelines. HPV is done. Lab work up is up to date through PCP.      Pt doing well on birth control and wishes to continue same. On it for migraine control as well.       Rae Ling, HOLLY  4/3/2025    Return in about 1 year (around 4/3/2026) for Annual physical.

## 2025-04-21 ENCOUNTER — TELEPHONE (OUTPATIENT)
Dept: NEUROLOGY | Facility: CLINIC | Age: 45
End: 2025-04-21
Payer: COMMERCIAL

## 2025-05-05 ENCOUNTER — TELEPHONE (OUTPATIENT)
Dept: INTERNAL MEDICINE | Facility: CLINIC | Age: 45
End: 2025-05-05

## 2025-05-05 NOTE — TELEPHONE ENCOUNTER
"  Caller: Talita English \"Apryl\"    Relationship: Self    Best call back number: [phone]     What is the best time to reach you: AROUND 11:30 AM OR ANYTIME     Who are you requesting to speak with (clinical staff, provider,  specific staff member): NURSE    What was the call regarding:     PATIENT IS REQUESTING TO SPEAK TO A NURSE REGARDING SOME QUESTIONS. PATIENT DID NOT DISCLOSE ADDITIONAL DETAILS.     Is it okay if the provider responds through [facility]: PLEASE CALL     "

## 2025-05-05 NOTE — TELEPHONE ENCOUNTER
Called and spoke with patient- she is having issues of joint pain, fatigue, throat  and face swelling- advised we would have to see her in office before any medication could be prescribed.  She turned down apt at this time - advised going to a walk in clinic where she lives or we could see her later in the week. She will call back if she decides to be seen

## 2025-05-17 DIAGNOSIS — M62.838 MUSCLE SPASMS OF NECK: ICD-10-CM

## 2025-05-19 RX ORDER — CYCLOBENZAPRINE HCL 10 MG
10 TABLET ORAL 3 TIMES DAILY PRN
Qty: 270 TABLET | Refills: 1 | Status: SHIPPED | OUTPATIENT
Start: 2025-05-19

## 2025-05-21 ENCOUNTER — TELEPHONE (OUTPATIENT)
Dept: NEUROLOGY | Facility: CLINIC | Age: 45
End: 2025-05-21
Payer: COMMERCIAL

## 2025-05-21 NOTE — TELEPHONE ENCOUNTER
CALLED TO LET PT KNOW THAT WE HAVE HAD TO CANCEL HER APPT THIS MORNING 5/21/2025 DUE TO PROVIDER CALLING OUT TODAY.    THIS IS 2ND TIME IN A ROW PT HAS BEEN CANCELLED, SHE WAS VERY UNDERSTANDING AND WE R/S TO A DAY THAT SHE HAS OTHER APPT IN [place], AS SHE LIVES 1 1/2 HOURS AWAY.     CC

## 2025-06-04 ENCOUNTER — OFFICE VISIT (OUTPATIENT)
Dept: INTERNAL MEDICINE | Facility: CLINIC | Age: 45
End: 2025-06-04
Payer: COMMERCIAL

## 2025-06-04 ENCOUNTER — OFFICE VISIT (OUTPATIENT)
Dept: NEUROLOGY | Facility: CLINIC | Age: 45
End: 2025-06-04
Payer: COMMERCIAL

## 2025-06-04 VITALS
TEMPERATURE: 98.1 F | WEIGHT: 225 LBS | DIASTOLIC BLOOD PRESSURE: 84 MMHG | OXYGEN SATURATION: 97 % | HEART RATE: 82 BPM | SYSTOLIC BLOOD PRESSURE: 122 MMHG | BODY MASS INDEX: 41.41 KG/M2 | HEIGHT: 62 IN

## 2025-06-04 VITALS
RESPIRATION RATE: 12 BRPM | BODY MASS INDEX: 41.41 KG/M2 | WEIGHT: 225 LBS | HEIGHT: 62 IN | HEART RATE: 64 BPM | DIASTOLIC BLOOD PRESSURE: 98 MMHG | SYSTOLIC BLOOD PRESSURE: 120 MMHG

## 2025-06-04 DIAGNOSIS — G43.719 INTRACTABLE CHRONIC MIGRAINE WITHOUT AURA AND WITHOUT STATUS MIGRAINOSUS: ICD-10-CM

## 2025-06-04 DIAGNOSIS — M19.90 INFLAMMATORY ARTHRITIS: ICD-10-CM

## 2025-06-04 DIAGNOSIS — G43.719 INTRACTABLE CHRONIC MIGRAINE WITHOUT AURA AND WITHOUT STATUS MIGRAINOSUS: Primary | ICD-10-CM

## 2025-06-04 DIAGNOSIS — E66.01 MORBID OBESITY: ICD-10-CM

## 2025-06-04 DIAGNOSIS — I10 ESSENTIAL HYPERTENSION: ICD-10-CM

## 2025-06-04 DIAGNOSIS — G43.909 EPISODIC MIGRAINE: ICD-10-CM

## 2025-06-04 DIAGNOSIS — M35.9 AUTOIMMUNE DISEASE: ICD-10-CM

## 2025-06-04 DIAGNOSIS — E03.9 HYPOTHYROIDISM, UNSPECIFIED TYPE: ICD-10-CM

## 2025-06-04 DIAGNOSIS — R73.03 PREDIABETES: Primary | ICD-10-CM

## 2025-06-04 LAB — HBA1C MFR BLD: 5.6 % (ref 4.5–5.7)

## 2025-06-04 RX ORDER — SUMATRIPTAN SUCCINATE 100 MG/1
100 TABLET ORAL AS NEEDED
Qty: 16 TABLET | Refills: 3 | Status: SHIPPED | OUTPATIENT
Start: 2025-06-04

## 2025-06-04 RX ORDER — ERENUMAB-AOOE 140 MG/ML
140 INJECTION, SOLUTION SUBCUTANEOUS
Qty: 1 ML | Refills: 4 | Status: SHIPPED | OUTPATIENT
Start: 2025-06-04

## 2025-06-04 RX ORDER — EPTINEZUMAB-JJMR 100 MG/ML
100 INJECTION INTRAVENOUS
Qty: 1.12 ML | Refills: 3 | Status: SHIPPED | OUTPATIENT
Start: 2025-06-04

## 2025-06-04 NOTE — PROGRESS NOTES
Subjective   Talita English is a 45 y.o. female is here today for follow-up chronic, intractable migraine.    History of Present Illness  Reporting increased frequency and refractoriness in her headaches.  Sometimes headaches are occurring daily.  She does respond better to sumatriptan then she has to either Ubrelvy or Nurtec.  She did not respond to Ubrelvy for prevention.  She did not respond to Nurtec given every other day for prevention.  She feels that she may have had some minimal response to Aimovig 140 mg monthly, this is not at all satisfactory response.  She did not respond over the long-term with 1 year of Botox.  He has been on multiple other preventative medications including beta-blockers, Namenda, antiepileptics, muscle relaxants, analgesics, anti-inflammatories and continues to have headaches that are disruptive of her daily activities.    Her present regimen is Aimovig 140 mg monthly, levetiracetam 500 mg in the a.m., 1000 mg in the p.m., memantine 10 mg twice per day, Naprosyn 500 mg twice per day if needed.  Rimegepant 75 mg ODT every other day.  Sumatriptan 100 mg when needed for migraine.  She also remains on Compazine when needed and Lopressor 25 mg daily for blood pressure but also considered in her migraine management.    Other than increased frequency and refractory nature of headaches she reports no new medical or surgical issues.       The following portions of the patient's history were reviewed and updated as appropriate: allergies, current medications, past family history, past medical history, past social history, past surgical history and problem list.    Review of Systems   Constitutional:  Positive for activity change and fatigue.   HENT:  Positive for congestion.    Eyes:  Positive for photophobia and visual disturbance.   Respiratory: Negative.     Cardiovascular: Negative.    Gastrointestinal:  Positive for nausea.   Musculoskeletal: Negative.    Neurological:  Positive for  light-headedness and headache.   Psychiatric/Behavioral: Negative.           Current Outpatient Medications:     cyclobenzaprine (FLEXERIL) 10 MG tablet, TAKE 1 TABLET BY MOUTH THREE TIMES DAILY AS NEEDED FOR MUSCLE SPASMS, Disp: 270 tablet, Rfl: 1    Erenumab-aooe (Aimovig) 140 MG/ML auto-injector, Inject 1 mL under the skin into the appropriate area as directed Every 30 (Thirty) Days., Disp: 1 mL, Rfl: 4    fluticasone (FLONASE) 50 MCG/ACT nasal spray, 2 sprays into the nostril(s) as directed by provider Daily., Disp: 11.1 mL, Rfl: 2    levETIRAcetam (KEPPRA) 500 MG tablet, TAKE 1 TABLET BY MOUTH EVERY MORNING THEN TAKE 2 TABLETS BY MOUTH EVERY EVENING, Disp: 270 tablet, Rfl: 1    Levonorgest-Eth Estrad 91-Day (Simpesse) 0.15-0.03 &0.01 MG, Take 1 tablet by mouth Daily., Disp: 91 each, Rfl: 3    levothyroxine (SYNTHROID, LEVOTHROID) 50 MCG tablet, TAKE 1 TABLET BY MOUTH DAILY, Disp: 90 tablet, Rfl: 3    memantine (NAMENDA) 10 MG tablet, Take 1 tablet by mouth 2 (Two) Times a Day., Disp: 180 tablet, Rfl: 1    metoprolol tartrate (LOPRESSOR) 25 MG tablet, TAKE 1 TABLET BY MOUTH TWICE DAILY, Disp: 180 tablet, Rfl: 3    naproxen (NAPROSYN) 500 MG tablet, Take 1 tablet by mouth 2 (Two) Times a Day As Needed for Mild Pain., Disp: 60 tablet, Rfl: 1    prochlorperazine (COMPAZINE) 5 MG tablet, Take 1 tablet by mouth Every 6 (Six) Hours As Needed for Nausea or Vomiting., Disp: , Rfl:     rimegepant sulfate ODT (Nurtec) 75 MG disintegrating tablet, Place 1 tablet under the tongue Daily As Needed (migraine)., Disp: 16 tablet, Rfl: 4    SUMAtriptan (IMITREX) 100 MG tablet, Take 1 tablet by mouth As Needed for Migraine. TAKE 1 TABLET BY MOUTH AT ONSET OF MIGRAINE. MAY REPEAT IN 2 HOURS AS NEEDED. NOT TO EXCEED 2 DOSES IN 24 HOURS, Disp: 16 tablet, Rfl: 3    Eptinezumab-jjmr (Vyepti) 100 MG/ML solution solution, Infuse 1 mL into a venous catheter Every 3 (Three) Months., Disp: 1.12 mL, Rfl: 3     Objective   Physical  Exam  Vitals and nursing note reviewed.   Eyes:      General: Vision grossly intact. Gaze aligned appropriately.   Cardiovascular:      Rate and Rhythm: Normal rate.   Pulmonary:      Effort: Pulmonary effort is normal.   Neurological:      Mental Status: She is alert and oriented to person, place, and time.      GCS: GCS eye subscore is 4. GCS verbal subscore is 5. GCS motor subscore is 6.      Cranial Nerves: Cranial nerves 2-12 are intact.      Sensory: Sensation is intact.      Motor: Motor function is intact.      Coordination: Coordination is intact.      Gait: Gait is intact.      Deep Tendon Reflexes: Reflexes are normal and symmetric.   Psychiatric:         Attention and Perception: Attention normal.         Mood and Affect: Mood normal.         Speech: Speech normal.         Behavior: Behavior normal.         Cognition and Memory: Cognition normal.           Assessment & Plan   Diagnoses and all orders for this visit:    1. Intractable chronic migraine without aura and without status migrainosus (Primary)  -     Eptinezumab-jjmr (Vyepti) 100 MG/ML solution solution; Infuse 1 mL into a venous catheter Every 3 (Three) Months.  Dispense: 1.12 mL; Refill: 3  -     Erenumab-aooe (Aimovig) 140 MG/ML auto-injector; Inject 1 mL under the skin into the appropriate area as directed Every 30 (Thirty) Days.  Dispense: 1 mL; Refill: 4  -     rimegepant sulfate ODT (Nurtec) 75 MG disintegrating tablet; Place 1 tablet under the tongue Daily As Needed (migraine).  Dispense: 16 tablet; Refill: 4    2. Episodic migraine  -     SUMAtriptan (IMITREX) 100 MG tablet; Take 1 tablet by mouth As Needed for Migraine. TAKE 1 TABLET BY MOUTH AT ONSET OF MIGRAINE. MAY REPEAT IN 2 HOURS AS NEEDED. NOT TO EXCEED 2 DOSES IN 24 HOURS  Dispense: 16 tablet; Refill: 3          At present recommend that she remain with Aimovig 140 mg monthly, rimegepant ODT 75 mg every other day for prevention of migraine.  We will seek approval for Vyepti  100 mg IV and when this is approved the Aimovig and Nurtec will be discontinued.  She will continue on memantine, Keppra and Lopressor.    For episodic migraine she will continue with sumatriptan, Naprosyn, Compazine.    Follow-up as scheduled.           Dictated utilizing Dragon dictation.

## 2025-06-06 ENCOUNTER — TELEPHONE (OUTPATIENT)
Dept: NEUROLOGY | Facility: CLINIC | Age: 45
End: 2025-06-06
Payer: COMMERCIAL

## 2025-06-06 NOTE — TELEPHONE ENCOUNTER
Explained that [employer] said Vyepti isn't on the list of covered medications.  She can contact the employer and ask them to add it.

## 2025-06-08 NOTE — PROGRESS NOTES
"      Chief Complaint  Hypertension and Prediabetes (A1c: 5.6)    Subjective        Talita English presents to [facility]    HPI    Patient here for the above problems.  See Assessment and Plan for further HPI components.      Review of Systems   Constitutional:  Positive for fatigue. Negative for chills, diaphoresis and fever.   HENT:  Negative for congestion and sore throat.    Respiratory:  Positive for cough.    Cardiovascular:  Positive for chest pain.   Gastrointestinal:  Positive for nausea. Negative for abdominal pain and vomiting.   Genitourinary:  Negative for dysuria.   Musculoskeletal:  Positive for myalgias and neck pain.   Skin:  Negative for rash.   Neurological:  Positive for weakness, numbness and headaches.       Objective   Vital Signs:  /84 (BP Location: Left arm, Patient Position: Sitting, Cuff Size: Adult)   Pulse 82   Temp 98.1 °F (36.7 °C) (Temporal)   Ht 157.5 cm (62\")   Wt 102 kg (225 lb)   SpO2 97%   BMI 41.15 kg/m²   Estimated body mass index is 41.15 kg/m² as calculated from the following:    Height as of this encounter: 157.5 cm (62\").    Weight as of this encounter: 102 kg (225 lb).      Physical Exam  Vitals and nursing note reviewed.   Constitutional:       Appearance: She is obese. She is not ill-appearing.   Eyes:      General: No scleral icterus.     Conjunctiva/sclera: Conjunctivae normal.   Cardiovascular:      Rate and Rhythm: Normal rate and regular rhythm.   Pulmonary:      Effort: Pulmonary effort is normal. No respiratory distress.   Skin:     General: Skin is warm.      Coloration: Skin is not pale.   Neurological:      General: No focal deficit present.      Mental Status: She is alert and oriented to person, place, and time.   Psychiatric:         Mood and Affect: Mood normal.         Behavior: Behavior normal.                       Assessment and Plan   Diagnoses and all orders for this visit:    1. Prediabetes (Primary)  - "     POC Glycated Hemoglobin, Total  -     Hemoglobin A1c; Future    2. Essential hypertension  -     Comprehensive Metabolic Panel; Future  -     CBC & Differential; Future  -     Lipid Panel; Future  -     TSH Rfx On Abnormal To Free T4; Future  -     Urinalysis With Microscopic - Urine, Clean Catch; Future    3. Inflammatory arthritis  -     C-reactive Protein; Future  -     Sedimentation rate, automated; Future    4. Hypothyroidism, unspecified type  -     Comprehensive Metabolic Panel; Future  -     Lipid Panel; Future  -     TSH Rfx On Abnormal To Free T4; Future    5. Autoimmune disease  -     C-reactive Protein; Future  -     Sedimentation rate, automated; Future    6. Intractable chronic migraine without aura and without status migrainosus    7. Morbid obesity        History of Present Illness  The patient presents for evaluation of an autoimmune condition, blood pressure management, and diabetes.    She reports a significant improvement in her condition following a minor flare-up last month. The flare-up was characterized by severe fatigue, joint pain, facial redness, swelling on the right side of her face, itching in her legs without any visible rash, and the presence of blisters and mouth sores on the right side. These symptoms are typically unilateral. She has been under considerable stress due to her daughter's health issues, which have been diagnosed as dysautonomia. She also notes that sun exposure seems to exacerbate her symptoms. Despite attempts to seek medical attention during the flare-up, she was unable to secure an appointment. She has previously consulted with a rheumatologist who prescribed steroids, which occasionally reduced the duration of her symptoms. However, the etiology of her condition remains undetermined.  I suspect possible stress reaction or an autoimmune condition.  I am going to have her try to send the pictures in via [place].  I would like her to review possible precipitating  factors next time it happens.  Food, stress, recent illnesses, weather changes, seasonal, etc.    The picture she showed me her side of her face looked different than the other.  A little more puffy.  Not drastic but noticiable.      She has observed that her diet has been suboptimal for the past 1.5 months due to the flare-up. She has been consuming quick meals, basic sandwiches, and takeout food. She has been experiencing severe exhaustion, necessitating immediate rest upon returning home from work. She is attempting to resume meal planning, which she believes will improve her condition.    She has documented the changes in her facial appearance during a flare-up, including redness, swelling on the right side, and blisters inside and outside her cheek. She initially mistook these symptoms for a cold sore, but they persisted for approximately 3 weeks. She also experienced an increase in migraines during this period, which she attributes to adverse weather conditions. She reports no rashes on her legs or arms but mentions a mild rash on her upper back and shoulders.    She has consulted with Dr. Carias, a rheumatologist, on several occasions and has also sought a second opinion from another rheumatologist. She was referred to [place] once. She has not seen a rheumatologist for approximately 2 years due to work commitments. She reports that her hands are more affected than other parts of her body, with swelling being a common symptom. She has been experiencing joint pain and severe fatigue for several years, with facial swelling, redness, vision changes, and mouth sores emerging more recently.    She reports feeling unwell on several occasions, with her systolic blood pressure dropping significantly and her diastolic blood pressure rising, resulting in nearly equal readings. This occurred sporadically over the course of a month, with readings such as 105/100.  I told her, I worry about the accuracy of these BP  readings.    FAMILY HISTORY  She mentions a family history of heart issues.      Assessment & Plan  Autoimmune condition.  Symptoms include fatigue, joint pain, facial swelling, and mouth sores, suggesting an autoimmune etiology. Fibromyalgia and rheumatoid arthritis were considered but ruled out due to the presence of rashes and absence of specific joint involvement. Compression gloves at night were suggested to alleviate hand swelling. An anti-inflammatory diet was recommended to potentially reduce the frequency and severity of symptoms, with advice to avoid refined sugars and processed foods. The potential benefits of anti-inflammatory supplements were discussed, although data on their efficacy is limited.    Blood pressure management.  The patient reported episodes of low blood pressure with near-equal systolic and diastolic readings, which may indicate inaccurate measurements. Advised to check her blood pressure cuff for accuracy. If her heart rate decreases significantly, she may need to hold her metoprolol. Monitoring heart rate and considering adjustments to medication were discussed.  Continue metoprolol 25 mg twice daily.    Prediabetes.  A1c level is 5.6, slightly elevated compared to previous readings. The patient admitted to a poor diet over the last month and a half due to her flare-up. Advised to improve her diet by meal planning and avoiding quick, unhealthy meals. Emphasis on returning to a healthier routine to manage blood sugar levels effectively.      Patient has a BMI > 30.  Obesity increases risks of diseases such as diabetes, coronary artery disease, hypertension, sleep apnea, hyperlipidemia, arthritis, and stroke.  Recommend focusing on portion control and making healthy diet choices.  Avoid fast food.  Avoid calorie beverages including sweet tea, juice, soft drinks, and alcohol.  Recommend increasing your activity and starting a regular exercise program. Can consider utilizing calorie  counting apps such as [employer].      Follows with neurology for migraines    Patient has hypothyroidism.  Levels have been stable.  Continue to monitor on levothyroxine 50 mcg.    Labs as above for her annual visit.   I have added CRP and sed rate to her annual labs to evaluate for active issues.      Result Review :  The following data was reviewed by: Grant Canela MD on 06/04/2025:  A1C Last 3 Results          12/11/2024    08:35 6/4/2025    08:34   HGBA1C Last 3 Results   Hemoglobin A1C 5.50  5.6                               Follow Up   No follow-ups on file.  Patient was given instructions and counseling regarding her condition or for health maintenance advice. Please see specific information pulled into the AVS if appropriate.       KENJI Canela MD, FACP, [facility]      Electronically signed by Grant Canela MD, 06/08/25, 4:31 PM CDT.    Patient or patient representative verbalized consent for the use of Ambient Listening during the visit with  Grant Canela MD for chart documentation. 6/8/2025  16:56 CDT      Answers submitted by the patient for this visit:  Problem not listed (Submitted on 6/3/2025)  Chief Complaint: Other medical problem  Reason for appointment: Scheduled follow up appt and recent autoimmune flare up  anorexia: No  joint pain: Yes  change in stool: No  joint swelling: Yes  swollen glands: Yes  vertigo: No  visual change: No  Onset: 1 to 6 months  Chronicity: recurrent  Frequency: weekly

## 2025-06-09 ENCOUNTER — TELEPHONE (OUTPATIENT)
Dept: NEUROLOGY | Facility: CLINIC | Age: 45
End: 2025-06-09
Payer: COMMERCIAL

## 2025-06-09 NOTE — TELEPHONE ENCOUNTER
Pharmacy Name:      [facility] - [facility] [address] [address] [phone] [facility] [phone] FX (Pharmacy)     JACE    Pharmacy representative phone number: [phone]    What medication are you calling in regards to: VYEPTI    What question does the pharmacy have: THEY NEED TO KNOW LOCATION OF PT RECEIVING THE INFUSION AND IS A PRIOR AUTH ON FILE PLEASE REVIEW AND ADVISE.

## 2025-06-10 RX ORDER — FAMOTIDINE 10 MG/ML
20 INJECTION, SOLUTION INTRAVENOUS AS NEEDED
OUTPATIENT
Start: 2025-06-16

## 2025-06-10 RX ORDER — HYDROCORTISONE SODIUM SUCCINATE 100 MG/2ML
100 INJECTION INTRAMUSCULAR; INTRAVENOUS AS NEEDED
OUTPATIENT
Start: 2025-06-16

## 2025-06-10 RX ORDER — SODIUM CHLORIDE 9 MG/ML
20 INJECTION, SOLUTION INTRAVENOUS ONCE
OUTPATIENT
Start: 2025-06-16

## 2025-06-10 RX ORDER — DIPHENHYDRAMINE HYDROCHLORIDE 50 MG/ML
50 INJECTION, SOLUTION INTRAMUSCULAR; INTRAVENOUS AS NEEDED
OUTPATIENT
Start: 2025-06-16

## 2025-06-10 NOTE — TELEPHONE ENCOUNTER
Explained that Vyepti isn't on the formulary, Apryl is going to call the insurance and request that they add it.

## 2025-06-12 ENCOUNTER — TELEPHONE (OUTPATIENT)
Dept: NEUROLOGY | Facility: CLINIC | Age: 45
End: 2025-06-12
Payer: COMMERCIAL

## 2025-06-12 NOTE — TELEPHONE ENCOUNTER
Explained that Vyepti isn't covered, Apryl said she would contact the employer and ask them to have insurance add it to the list of medications they will cover.

## 2025-06-12 NOTE — TELEPHONE ENCOUNTER
Caller: [facility] - [place], [place] - [address] [facility] [phone] [facility] [phone] FX    Relationship: Pharmacy    What was the call regarding: EDMUNDO WAS CALLING TO SEE IF THE PA FOR THE VYEPTI IS BEING PROCESSED THROUGH Private Practice.     PLEASE ADVISE  THANK YOU

## 2025-06-13 ENCOUNTER — PATIENT MESSAGE (OUTPATIENT)
Dept: NEUROLOGY | Facility: CLINIC | Age: 45
End: 2025-06-13
Payer: COMMERCIAL

## 2025-06-13 ENCOUNTER — TELEPHONE (OUTPATIENT)
Dept: NEUROLOGY | Facility: CLINIC | Age: 45
End: 2025-06-13
Payer: COMMERCIAL

## 2025-06-13 NOTE — TELEPHONE ENCOUNTER
Caller:     Relationship:     Best call back number:   Telephone Information:   Mobile [phone]     PLEASE CALL WHEN COMPLETED    Which medication are you concerned about: VYEPTI     What are your concerns: INSURANCE ADVISED THAT IT WILL NOT UNDER PHARMACY BENEFITS IT WOULD BE UNDER THE MEDICAL SIDE TO BE APPROVED AS IT IS AN INFUSION. SHE STATES AS LONG AS A PRIOR AUTH SHOWING MEDICAL NECESSITY IS SENT TO THE MEDICAL SIDE OF [employer](THEY ADVISED THE OFFICE WOULD KNOW HOW TO COMPLETE THIS AND WHO THEY WOULD NEED TO CONTACT.) IT WILL BE APPROVED

## 2025-06-22 DIAGNOSIS — G43.719 INTRACTABLE CHRONIC MIGRAINE WITHOUT AURA AND WITHOUT STATUS MIGRAINOSUS: ICD-10-CM

## 2025-06-23 RX ORDER — LEVETIRACETAM 500 MG/1
TABLET ORAL
Qty: 270 TABLET | Refills: 0 | Status: SHIPPED | OUTPATIENT
Start: 2025-06-23

## 2025-07-26 DIAGNOSIS — G43.909 EPISODIC MIGRAINE: ICD-10-CM

## 2025-07-28 RX ORDER — SUMATRIPTAN SUCCINATE 100 MG/1
TABLET ORAL
Qty: 16 TABLET | Refills: 2 | Status: SHIPPED | OUTPATIENT
Start: 2025-07-28

## 2025-08-01 DIAGNOSIS — G43.719 INTRACTABLE CHRONIC MIGRAINE WITHOUT AURA AND WITHOUT STATUS MIGRAINOSUS: Primary | ICD-10-CM

## 2025-08-01 NOTE — TELEPHONE ENCOUNTER
Caller: Talitajustina English      Best call back number:     [phone] (Mobile)       Who are you requesting to speak with (clinical staff, provider,  specific staff member): CLINICAL    What was the call regarding: SHE STATES SHE WAS WONDERING IF JEFF HAD SPOKE TO RAJWINDER BEJARANO ABOUT THE NEXT INJECTION SHE NEEDS TO TRY BEFORE GETTING HER VYEPTI APPROVED

## 2025-08-01 NOTE — TELEPHONE ENCOUNTER
----- Message from Leandro Jang sent at 7/30/2025  4:33 PM CDT -----  OK  ----- Message -----  From: Jacinta Vega LPN  Sent: 7/24/2025   2:56 PM CDT  To: CARLEE Guevara    Insurance denied Vyepti, she has to try Emgality or Ajovy.  She said she has hypertension and wanted to make sure you were aware.

## 2025-08-05 ENCOUNTER — TELEPHONE (OUTPATIENT)
Dept: NEUROLOGY | Facility: CLINIC | Age: 45
End: 2025-08-05
Payer: COMMERCIAL

## 2025-08-24 DIAGNOSIS — G43.719 INTRACTABLE CHRONIC MIGRAINE WITHOUT AURA AND WITHOUT STATUS MIGRAINOSUS: ICD-10-CM

## 2025-08-24 DIAGNOSIS — I10 ESSENTIAL HYPERTENSION: ICD-10-CM

## 2025-08-25 RX ORDER — METOPROLOL TARTRATE 25 MG/1
25 TABLET, FILM COATED ORAL 2 TIMES DAILY
Qty: 180 TABLET | Refills: 3 | Status: SHIPPED | OUTPATIENT
Start: 2025-08-25

## 2025-08-25 RX ORDER — LEVOTHYROXINE SODIUM 50 UG/1
50 TABLET ORAL DAILY
Qty: 90 TABLET | Refills: 3 | Status: SHIPPED | OUTPATIENT
Start: 2025-08-25

## 2025-08-26 DIAGNOSIS — G43.719 INTRACTABLE CHRONIC MIGRAINE WITHOUT AURA AND WITHOUT STATUS MIGRAINOSUS: ICD-10-CM

## 2025-08-26 RX ORDER — MEMANTINE HYDROCHLORIDE 10 MG/1
10 TABLET ORAL 2 TIMES DAILY
Qty: 180 TABLET | Refills: 1 | Status: CANCELLED | OUTPATIENT
Start: 2025-08-26

## 2025-08-26 RX ORDER — MEMANTINE HYDROCHLORIDE 10 MG/1
10 TABLET ORAL 2 TIMES DAILY
Qty: 180 TABLET | Refills: 0 | Status: SHIPPED | OUTPATIENT
Start: 2025-08-26

## 2025-08-26 RX ORDER — LEVETIRACETAM 500 MG/1
TABLET ORAL
Qty: 270 TABLET | Refills: 0 | Status: CANCELLED | OUTPATIENT
Start: 2025-08-26

## (undated) DEVICE — SENSR O2 OXIMAX FNGR A/ 18IN NONSTR

## (undated) DEVICE — THE CHANNEL CLEANING BRUSH IS A NYLON FLEXI BRUSH ATTACHED TO A FLEXIBLE PLASTIC SHEATH DESIGNED TO SAFELY REMOVE DEBRIS FROM FLEXIBLE ENDOSCOPES.

## (undated) DEVICE — CUFF,BP,DISP,1 TUBE,ADULT,HP: Brand: MEDLINE

## (undated) DEVICE — YANKAUER,BULB TIP WITH VENT: Brand: ARGYLE

## (undated) DEVICE — CONMED SCOPE SAVER BITE BLOCK, 20X27 MM: Brand: SCOPE SAVER

## (undated) DEVICE — Device: Brand: DEFENDO AIR/WATER/SUCTION AND BIOPSY VALVE

## (undated) DEVICE — FRCP BX RADJAW4 NDL 2.8 240 STD OG

## (undated) DEVICE — ENDOGATOR AUXILIARY WATER JET CONNECTOR: Brand: ENDOGATOR

## (undated) DEVICE — TBG SMPL FLTR LINE NASL 02/C02 A/ BX/100